# Patient Record
Sex: MALE | Race: WHITE | Employment: OTHER | URBAN - METROPOLITAN AREA
[De-identification: names, ages, dates, MRNs, and addresses within clinical notes are randomized per-mention and may not be internally consistent; named-entity substitution may affect disease eponyms.]

---

## 2017-01-12 DIAGNOSIS — E11.21 DIABETIC NEPHROPATHY (H): Primary | ICD-10-CM

## 2017-01-12 RX ORDER — GLIPIZIDE 10 MG/1
10 TABLET ORAL 2 TIMES DAILY
Qty: 180 TABLET | Refills: 1 | Status: SHIPPED | OUTPATIENT
Start: 2017-01-12 | End: 2017-08-04

## 2017-01-12 RX ORDER — CELECOXIB 200 MG/1
200 CAPSULE ORAL DAILY
Qty: 180 CAPSULE | Refills: 1 | Status: SHIPPED | OUTPATIENT
Start: 2017-01-12 | End: 2017-05-03

## 2017-01-12 RX ORDER — GABAPENTIN 300 MG/1
600 CAPSULE ORAL 2 TIMES DAILY
Qty: 360 CAPSULE | Refills: 3 | Status: SHIPPED | OUTPATIENT
Start: 2017-01-12 | End: 2017-08-04

## 2017-02-17 ENCOUNTER — DOCUMENTATION ONLY (OUTPATIENT)
Dept: VASCULAR SURGERY | Facility: CLINIC | Age: 77
End: 2017-02-17

## 2017-03-09 DIAGNOSIS — E11.9 DIABETES MELLITUS (H): ICD-10-CM

## 2017-03-13 ENCOUNTER — TRANSFERRED RECORDS (OUTPATIENT)
Dept: HEALTH INFORMATION MANAGEMENT | Facility: CLINIC | Age: 77
End: 2017-03-13

## 2017-03-14 ENCOUNTER — TRANSFERRED RECORDS (OUTPATIENT)
Dept: HEALTH INFORMATION MANAGEMENT | Facility: CLINIC | Age: 77
End: 2017-03-14

## 2017-03-27 ENCOUNTER — OFFICE VISIT (OUTPATIENT)
Dept: INTERNAL MEDICINE | Facility: CLINIC | Age: 77
End: 2017-03-27

## 2017-03-27 VITALS
OXYGEN SATURATION: 96 % | BODY MASS INDEX: 32.99 KG/M2 | RESPIRATION RATE: 20 BRPM | DIASTOLIC BLOOD PRESSURE: 78 MMHG | HEART RATE: 85 BPM | SYSTOLIC BLOOD PRESSURE: 122 MMHG | WEIGHT: 217 LBS

## 2017-03-27 DIAGNOSIS — Z13.5 SCREENING FOR DIABETIC RETINOPATHY: ICD-10-CM

## 2017-03-27 DIAGNOSIS — Z13.89 SCREENING FOR DIABETIC PERIPHERAL NEUROPATHY: ICD-10-CM

## 2017-03-27 DIAGNOSIS — R29.898 WEAKNESS OF BOTH LOWER EXTREMITIES: Primary | ICD-10-CM

## 2017-03-27 DIAGNOSIS — I73.9 CLAUDICATION (H): ICD-10-CM

## 2017-03-27 LAB — INTERPRETATION ECG - MUSE: NORMAL

## 2017-03-27 ASSESSMENT — PAIN SCALES - GENERAL: PAINLEVEL: NO PAIN (0)

## 2017-03-27 NOTE — PROGRESS NOTES
"SUBJECTIVE:    Pt is a 76 year old male with pmh of     Patient Active Problem List   Diagnosis     Hyperlipidemia     Diabetes mellitus type 2, insulin dependent (H)     Hypertension     Diverticulosis     ED (erectile dysfunction)     Diabetic nephropathy (H)     COPD (chronic obstructive pulmonary disease) (H)     Asthma exacerbation     Gout     Decreased GFR       who is here for evaluation of had concerns including Flu.    Mr. Gonzalez presents today with multiple concerns.    Firstly he is experiencing chest tightness. This occurs in the morning, lasting two hours. He experiences tightness in his right chest and his right upper back. He reports that he has had pain like this before and attributes it to muscle spasms. He is having the pain this morning. It is not exacerbated by exercise or walking. It goes away without taking any medications.    Secondly, he is experiencing decreased stamina in his lower extremities. After walking for about five minutes he feels exhausted and experiences \"soreness\" in his thighs and hips. This soreness improves with rest. He does not feel short of breath during this exertion. He reports that he has had this issue for years, but in the last few months it has gotten worse. He contributes that he has also had issues with balance. He reports that he was offered PT in the past which he had declined, but at this point he is interested in PT.    Thirdly, he is experiencing some lingering phlegm production from a cold he had six weeks ago. He reports clear, thick mucous that causes him to clear his throat. He denies a cough, SOB, or chest pain.    Fourthly, after his wife passed in November he has been experiencing significant grief. He staying with his daughter in [place] for 8 weeks which significantly helped his mood. His friend, Collins, reports that Mr. Gonzalez goes through cycles of having a low mood and then back to normal. He was previously prescribed sertraline, but he reports " that he has not taken it yet because he feels that his grief process is still natural. He reports that it is difficult being alone and questions if he should have come back from [place].    Current Outpatient Prescriptions   Medication Sig Dispense Refill     blood glucose monitoring (ACCU-CHEK MERY) test strip Test 2 times daily 200 each 3     gabapentin (NEURONTIN) 300 MG capsule Take 2 capsules (600 mg) by mouth 2 times daily 360 capsule 3     glipiZIDE (GLUCOTROL) 10 MG tablet Take 1 tablet (10 mg) by mouth 2 times daily 180 tablet 1     celecoxib (CELEBREX) 200 MG capsule Take 1 capsule (200 mg) by mouth daily 180 capsule 1     sertraline (ZOLOFT) 50 MG tablet Take 1 tablet (50 mg) by mouth daily 60 tablet 1     camphor-menthol (DERMASARRA) 0.5-0.5 % LOTN Applied to skin 3 times day p.r.n. for itching 1 Bottle 3     insulin pen needle (B-D U/F) 31G X 8 MM Use twice daily or as directed. 200 each 3     insulin glargine (LANTUS SOLOSTAR) 100 UNIT/ML PEN Inject 30 Units Subcutaneous 2 times daily 30 mL 1     metFORMIN modified (GLUMETZA) 500 MG 24 hr tablet Take 2 tablets every morning, 1 tablet at noon, and 2 tablets at bedtime. 450 tablet 1     allopurinol (ZYLOPRIM) 300 MG tablet Take 1 tablet (300 mg) by mouth daily 100 tablet 1     omeprazole (PRILOSEC) 40 MG capsule Take 1 capsule (40 mg) by mouth 2 times daily 180 capsule 1     cyclobenzaprine (FLEXERIL) 5 MG tablet Take one or two daily as needed for severe neck neck pain. 42 tablet 0     chlorthalidone (HYGROTON) 25 MG tablet Take one half tablet every morning.    Discontinue the hydrochlorothiazide 12.5 mg 60 tablet 3     losartan (COZAAR) 100 MG tablet Take 1 tablet (100 mg) by mouth daily 90 tablet 3     amLODIPine (NORVASC) 10 MG tablet Take 1 tablet (10 mg) by mouth daily 90 tablet 3     Dentifrices (BIOTENE DRY MOUTH) PSTE Apply 1 spray to affected area       simvastatin (ZOCOR) 20 MG tablet Take 1 tablet (20 mg) by mouth At Bedtime 90 tablet 3      indomethacin (INDOCIN) 50 MG capsule Take 1 capsule (50 mg) by mouth 3 times daily as needed Use only during a gout attack 30 capsule 0     albuterol (PROAIR HFA, PROVENTIL HFA, VENTOLIN HFA) 108 (90 BASE) MCG/ACT inhaler Inhale 2 puffs into the lungs every 6 hours       Cholecalciferol (VITAMIN D-3) 1000 UNITS CAPS Take 1,000 Units by mouth daily       saw palmetto 160 MG CAPS Take 160 mg by mouth daily       aspirin 81 MG tablet Take by mouth daily       tiotropium (SPIRIVA HANDIHALER) 18 MCG inhalation capsule Inhale contents of one capsule daily. 30 capsule 1     fluticasone-salmeterol (ADVAIR) 500-50 MCG/DOSE diskus inhaler Inhale 1 puff into the lungs 2 times daily 60 Inhaler 0     fluticasone (FLOVENT HFA) 110 MCG/ACT inhaler Inhale 2 puffs into the lungs 2 times daily 1 Inhaler 12     fluticasone (FLONASE) 50 MCG/ACT nasal spray 2 sprays by Both Nostrils route daily.       HYDROcodone-acetaminophen (NORCO) 5-325 MG per tablet Take 1 tablet by mouth nightly as needed for moderate to severe pain (Patient not taking: Reported on 3/27/2017) 10 tablet 0       Social History   Substance Use Topics     Smoking status: Passive Smoke Exposure - Never Smoker     Last attempt to quit: 2/10/1982     Smokeless tobacco: Never Used     Alcohol use 0.5 oz/week     1 drink(s) per week       Review Of Systems  Skin: negative  Eyes: negative  Ears/Nose/Throat: nasal congestion  Respiratory: No shortness of breath, dyspnea on exertion, cough, or hemoptysis  Cardiovascular: chest tightness  Gastrointestinal: negative  Genitourinary: negative  Musculoskeletal: muscular weakness  Neurologic: negative  Psychiatric: negative  Hematologic/Lymphatic/Immunologic: negative  Endocrine: negative    OBJECTIVE:  /78 (BP Location: Right arm, Patient Position: Chair, Cuff Size: Adult Large)  Pulse 85  Resp 20  Wt 98.4 kg (217 lb)  SpO2 96%  BMI 32.99 kg/m2  GENERAL APPEARANCE: Alert, no acute distress  EYES: PERRL, EOM normal,  conjunctiva and lids normal  HENT: Ears and TMs normal, oral mucosa and posterior oropharynx normal  NECK: No adenopathy,masses or thyromegaly  RESP: lungs clear to auscultation   CV: normal rate, regular rhythm, no murmur or gallop. No JVD. No palpable DIP bilaterally.  ABDOMEN: soft, no organomegaly, masses or tenderness  LYMPHATICS: No cervical, supraclavicular or inguinal adenopathy  MS: extremities normal, no peripheral edema. No chest pain to palpation,  NEURO: Alert, oriented, speech and mentation normal. 5/5 strength in upper and lower extremities bilaterally. Romberg test negative.  PSYCHE: mentation appears normal, affect and mood normal    ASSESSMENT/PLAN:    Mr. Gonzalez meño  75 yo male with a history of diabetes and hypertension.     Firstly, Atypical chest pain. EKG was unchanged from previous EKG with no ST abnormalities. His chest pain seems consistent with musculoskeletal pain. I recommended that he takes his previously prescribed Flexeril for neck and chest pain as he is describing now. If his pain changes in quality or severity he needs to be re-evaluated.    Secondly, Claudication. His leg stamina and soreness seems consistent with claudication. I ordered FRANCESCO duplex ultrasound to evaluated him for vascular disease as an etiology for his complaints. I ordered Balance PT to be done here for his decreased stamina and balance issues.    Thirdly, Congestion. He continues to feel congested after experiencing a cold 8 weeks ago. His complaints are consistent with a post-nasal drip. These symptoms are not exacerbating his COPD. I will hold off on prescribing a new medication. I will see him again on 4/10/17 at which time I will follow-up on his symptoms.    Fourthly, Severe grief reaction. His wife passed away in November and he is still struggling with the grief process. I previously prescribed him Sertralaine to help with this process. I recommended that he consistently takes this to level out his labile  symptoms.    This note was written by Willie Patel MS4 acting as scribe for Dr. Jeb MD.

## 2017-03-27 NOTE — PATIENT INSTRUCTIONS
Primary Care Center Medication Refill Request Information:  * Please contact your pharmacy regarding ANY request for medication refills.  ** [facility] Prescription Fax = [phone]  * Please allow 3 business days for routine medication refills.  * Please allow 5 business days for controlled substance medication refills.     Primary Care Center Test Result notification information:  *You will be notified with in 7-10 days of your appointment day regarding the results of your test.  If you are on MyChart you will be notified as soon as the provider has reviewed the results and signed off on them.

## 2017-03-27 NOTE — MR AVS SNAPSHOT
After Visit Summary   3/27/2017    Rolando Gonzalez    MRN: [id number]           Patient Information     Date Of Birth          1940        Visit Information        Provider Department      3/27/2017 8:10 AM Denise Russ MD [facility]        Today's Diagnoses     Weakness of both lower extremities    -  1    Screening for diabetic retinopathy        Screening for diabetic peripheral neuropathy        Claudication (H)          Care Instructions    Primary Care Center Medication Refill Request Information:  * Please contact your pharmacy regarding ANY request for medication refills.  ** [facility] Prescription Fax = [phone]  * Please allow 3 business days for routine medication refills.  * Please allow 5 business days for controlled substance medication refills.     Primary Care Center Test Result notification information:  *You will be notified with in 7-10 days of your appointment day regarding the results of your test.  If you are on MyChart you will be notified as soon as the provider has reviewed the results and signed off on them.        Follow-ups after your visit        Additional Services     OPHTHALMOLOGY ADULT REFERRAL       Your provider has referred you to: [facility]: Eye Clinic [facility] [phone]   [email or web address]    Please be aware that coverage of these services is subject to the terms and limitations of your health insurance plan.  Call member services at your health plan with any benefit or coverage questions.      Please bring the following with you to your appointment:    (1) Any X-Rays, CTs or MRIs which have been performed.  Contact the facility where they were done to arrange for  prior to your scheduled appointment.    (2) List of current medications  (3) This referral request   (4) Any documents/labs given to you for this referral            PHYSICAL THERAPY REFERRAL       *This therapy referral will be  filtered to a centralized scheduling office at [facility] and the patient will receive a call to schedule an appointment at a [place] location most convenient for them. *     [facility] provides Physical Therapy evaluation and treatment and many specialty services across the [place] system.  If requesting a specialty program, please choose from the list below.    If you have not heard from the scheduling office within 2 business days, please call [phone] for all locations, with the exception of [place], please call [phone].  Treatment: Evaluation & Treatment  Special Instructions/Modalities: Balance  Special Programs: N Balance PT    Please be aware that coverage of these services is subject to the terms and limitations of your health insurance plan.  Call member services at your health plan with any benefit or coverage questions.      **Note to Provider:  If you are referring outside of [place] for the therapy appointment, please list the name of the location in the  special instructions  above, print the referral and give to the patient to schedule the appointment.                  Follow-up notes from your care team     Return in about 4 weeks (around 4/24/2017).      Your next 10 appointments already scheduled     Mar 31, 2017  1:00 PM CDT   US FRANCESCO DOPPLER NO EXERCISE with [id number] ([facility])    [address] [place] [address] [phone]           Please bring a list of your medicines (including vitamins, minerals and over-the-counter drugs). Also, tell your doctor about any allergies you may have. Wear comfortable clothes and leave your valuables at home.  No caffeine or tobacco for 1 hour prior to exam.  Please call the Imaging Department at your exam site with any questions.            Apr 13, 2017  8:00 AM CDT   LAB with  LAB   [facility] ([facility])    [address] [place] [address]  [address] [facility] [id number] [phone]           Patient must bring picture ID.  Patient should be prepared to give a urine specimen  Please do not eat 10-12 hours before your appointment if you are coming in fasting for labs on lipids, cholesterol, or glucose (sugar).  Pregnant women should follow their Care Team instructions. Water with medications is okay. Do not drink coffee or other fluids.   If you have concerns about taking  your medications, please ask at office or if scheduling via [employer], send a message by clicking on Secure Messaging, Message Your Care Team.            Apr 13, 2017 10:00 AM CDT   NEW CORNEA with Haider Doyle MD   Eye Clinic ([facility])    [address] [facility]  [address] [address]  [facility] [address]   [phone]            Apr 24, 2017  8:40 AM CDT   (Arrive by 8:25 AM)   Return Visit with Denise Russ MD   [facility] ([facility])    [address] [place] [address]  [address] [facility] [id number] [phone]              Future tests that were ordered for you today     Open Future Orders        Priority Expected Expires Ordered    Lipid panel reflex to direct LDL - -(Today) Routine 3/27/2017 3/27/2018 3/27/2017    US FRANCESCO Doppler No Exercise Routine  3/27/2018 3/27/2017    HEMOGLOBIN A1C -(Today) Routine 3/27/2017 3/27/2018 3/27/2017            Who to contact     Please call your clinic at [phone] to:    Ask questions about your health    Make or cancel appointments    Discuss your medicines    Learn about your test results    Speak to your doctor   If you have compliments or concerns about an experience at your clinic, or if you wish to file a complaint, please contact [facility] Patient Relations at [phone] or email us at [email or web address]         Additional Information About Your Visit         [employer] Information     [employer] gives you secure access to your electronic health record. If you see a primary care provider, you can also send messages to your care team and make appointments. If you have questions, please call your primary care clinic.  If you do not have a primary care provider, please call [phone] and they will assist you.      [employer] fusion is an electronic gateway that provides easy, online access to your medical records. With [employer] fusion, you can request a clinic appointment, read your test results, renew a prescription or communicate with your care team.     To access your existing account, please contact your [facility] or call [phone] for assistance.        Care EveryWhere ID     This is your Care EveryWhere ID. This could be used by other organizations to access your [facility] medical records  [id number]        Your Vitals Were     Pulse Respirations Pulse Oximetry BMI (Body Mass Index)          85 20 96% 32.99 kg/m2         Blood Pressure from Last 3 Encounters:   03/27/17 122/78   12/20/16 148/77   09/19/16 144/86    Weight from Last 3 Encounters:   03/27/17 98.4 kg (217 lb)   12/20/16 101.2 kg (223 lb)   09/19/16 103.4 kg (228 lb)              We Performed the Following     EKG Performed in Clinic w/ Provider Reading Fee     FOOT EXAM - NO CHARGE     OPHTHALMOLOGY ADULT REFERRAL     PHYSICAL THERAPY REFERRAL        Primary Care Provider Office Phone # Fax #    Denise Russ [phone]        PHYSICIANS [address] [facility] [address]  [facility] [address]        Thank you!     Thank you for choosing [facility]  for your care. Our goal is always to provide you with excellent care. Hearing back from our patients is one way we can continue to improve our services. Please take a few minutes to complete the written survey that you may receive in the mail after your visit with us. Thank you!             Your Updated  Medication List - Protect others around you: Learn how to safely use, store and throw away your medicines at www.disposemymeds.org.          This list is accurate as of: 3/27/17 10:34 AM.  Always use your most recent med list.                   Brand Name Dispense Instructions for use    albuterol 108 (90 BASE) MCG/ACT Inhaler    PROAIR HFA/PROVENTIL HFA/VENTOLIN HFA     Inhale 2 puffs into the lungs every 6 hours       allopurinol 300 MG tablet    ZYLOPRIM    100 tablet    Take 1 tablet (300 mg) by mouth daily       amLODIPine 10 MG tablet    NORVASC    90 tablet    Take 1 tablet (10 mg) by mouth daily       aspirin 81 MG tablet      Take by mouth daily       BIOTENE DRY MOUTH Pste      Apply 1 spray to affected area       blood glucose monitoring test strip    ACCU-CHEK MERY    200 each    Test 2 times daily       camphor-menthol 0.5-0.5 % Lotn    DERMASARRA    1 Bottle    Applied to skin 3 times day p.r.n. for itching       celecoxib 200 MG capsule    celeBREX    180 capsule    Take 1 capsule (200 mg) by mouth daily       chlorthalidone 25 MG tablet    HYGROTON    60 tablet    Take one half tablet every morning.  Discontinue the hydrochlorothiazide 12.5 mg       cyclobenzaprine 5 MG tablet    FLEXERIL    42 tablet    Take one or two daily as needed for severe neck neck pain.       fluticasone 110 MCG/ACT Inhaler    FLOVENT HFA    1 Inhaler    Inhale 2 puffs into the lungs 2 times daily       fluticasone 50 MCG/ACT spray    FLONASE     2 sprays by Both Nostrils route daily.       fluticasone-salmeterol 500-50 MCG/DOSE diskus inhaler    ADVAIR    60 Inhaler    Inhale 1 puff into the lungs 2 times daily       gabapentin 300 MG capsule    NEURONTIN    360 capsule    Take 2 capsules (600 mg) by mouth 2 times daily       glipiZIDE 10 MG tablet    GLUCOTROL    180 tablet    Take 1 tablet (10 mg) by mouth 2 times daily       HYDROcodone-acetaminophen 5-325 MG per tablet    NORCO    10 tablet    Take 1 tablet by mouth  nightly as needed for moderate to severe pain       indomethacin 50 MG capsule    INDOCIN    30 capsule    Take 1 capsule (50 mg) by mouth 3 times daily as needed Use only during a gout attack       insulin glargine 100 UNIT/ML injection    LANTUS SOLOSTAR    30 mL    Inject 30 Units Subcutaneous 2 times daily       insulin pen needle 31G X 8 MM    B-D U/F    200 each    Use twice daily or as directed.       losartan 100 MG tablet    COZAAR    90 tablet    Take 1 tablet (100 mg) by mouth daily       metFORMIN modified 500 MG 24 hr tablet    GLUMETZA    450 tablet    Take 2 tablets every morning, 1 tablet at noon, and 2 tablets at bedtime.       omeprazole 40 MG capsule    priLOSEC    180 capsule    Take 1 capsule (40 mg) by mouth 2 times daily       saw palmetto 160 MG Caps      Take 160 mg by mouth daily       sertraline 50 MG tablet    ZOLOFT    60 tablet    Take 1 tablet (50 mg) by mouth daily       simvastatin 20 MG tablet    ZOCOR    90 tablet    Take 1 tablet (20 mg) by mouth At Bedtime       tiotropium 18 MCG capsule    SPIRIVA HANDIHALER    30 capsule    Inhale contents of one capsule daily.       Vitamin D-3 1000 UNITS Caps      Take 1,000 Units by mouth daily

## 2017-03-27 NOTE — PROGRESS NOTES
The medical student acted as scribe and the encounter documented above was completely performed by myself.  Supervising Doctor Denise Russ MD

## 2017-03-27 NOTE — NURSING NOTE
"Chief Complaint   Patient presents with     Flu     Started out 3 months ago with \"flu\", \" goes and comes\",legs and hips pain with walking/weakness and chest tightness   Kristy Wise LPN 8:37 AM on 3/27/2017  "

## 2017-04-04 ENCOUNTER — HOSPITAL ENCOUNTER (OUTPATIENT)
Dept: PHYSICAL THERAPY | Facility: CLINIC | Age: 77
Setting detail: THERAPIES SERIES
End: 2017-04-04
Attending: INTERNAL MEDICINE
Payer: MEDICARE

## 2017-04-04 PROCEDURE — 97110 THERAPEUTIC EXERCISES: CPT | Mod: GP | Performed by: PHYSICAL THERAPIST

## 2017-04-04 PROCEDURE — G8978 MOBILITY CURRENT STATUS: HCPCS | Mod: GP,CK | Performed by: PHYSICAL THERAPIST

## 2017-04-04 PROCEDURE — 40000719 ZZHC STATISTIC PT DEPARTMENT NEURO VISIT: Performed by: PHYSICAL THERAPIST

## 2017-04-04 PROCEDURE — G8979 MOBILITY GOAL STATUS: HCPCS | Mod: GP,CJ | Performed by: PHYSICAL THERAPIST

## 2017-04-04 PROCEDURE — 97162 PT EVAL MOD COMPLEX 30 MIN: CPT | Mod: GP | Performed by: PHYSICAL THERAPIST

## 2017-04-04 ASSESSMENT — 6 MINUTE WALK TEST (6MWT): TOTAL DISTANCE WALKED (FT): 1075

## 2017-04-04 NOTE — PROGRESS NOTES
04/04/17 1100   Signing Clinician's Name / Credentials   Signing clinician's name / credentials [facility]    Functional Gait Assessment (HEATHER Lobo., Kay GMaura F., et al. (2004))   1. GAIT LEVEL SURFACE 2   2. CHANGE IN GAIT SPEED 2   3. GAIT WITH HORIZONTAL HEAD TURNS 2   4. GAIT WITH VERTICAL HEAD TURNS 2   5. GAIT AND PIVOT TURN 3   6. STEP OVER OBSTACLE 3   7. GAIT WITH NARROW BASE OF SUPPORT 0   8. GAIT WITH EYES CLOSED 1   9. AMBULATING BACKWARDS 1   10. STEPS 2   Total Functional Gait Assessment Score   TOTAL SCORE: (MAXIMUM SCORE 30) 18

## 2017-04-04 NOTE — PROGRESS NOTES
04/04/17 1100   Quick Adds   Type of Visit Initial OP PT Evaluation   General Information   Start of Care Date 04/04/17   Referring Physician Denise Russ MD    Orders Evaluate and Treat as Indicated   Order Date 03/27/17   Medical Diagnosis Weakness of both lower extremities    Onset of illness/injury or Date of Surgery 03/27/17   Surgical/Medical history reviewed Yes   Pertinent history of current problem (include personal factors and/or comorbidities that impact the POC) Patient reports legs feel tired all the time and has gotten worse over the past 6 months to a year. Patient states it has also gotten worse since his wife's death in November 2016. Patient reports not using a AD. Pt states he gets tired walking easily and is not able to keep up with people anymore. Patient states about 3-4 weeks ago had to walk a long ways and was afraid he might fall. Patient states he senses balance issues. Patient states he gets short of breath with walking. Client is still grieving a lot for the loss of his wife. Patient states he tried medication prescribed for depression, however he discontinued it since he did not like the side effects. PMH significant for diabetic neuropathy, PVD, COPD, and asthma.    Prior level of function comment Patient independent with all ADLs, no outside assistance needed. Still driving.    [facility]   Home/Community Accessibility Comments 1 story. Lives alone.    Assistive Devices Comments No AD used at this time   Patient/Family Goals Statement improve balance, be able to walk for longer    Fall Risk Screen   Fall screen completed by PT   Per patient - Fall 2 or more times in past year? No   Per patient - Fall with injury in past year? No   Timed Up and Go score (seconds) 11.57   Is patient a fall risk? Yes   System Outcome Measures   -PAC  Basic Mobility Score Level  (Lower scores equate to lower levels of function) 68.81   AM-PAC  Daily Activity Score Level   (Lower scores equate to lower levels of function) 80.91   AM-PAC  Applied Cognitive Score Level  (Lower scores equate to lower levels of function) 39.28   Pain   Pain comments Pain in LEs but is improved with rest   Cognitive Status Examination   Level of Consciousness alert   Strength   Strength Comments B hip flexion 3/5,  B knee extension 4/5, Dorsiflexion R 4/5 and L 3+/5, B hip abduction 3/5,  B knee flexion 4/5,  B hip extension 3+/5   Bed Mobility   Bed Mobility Comments Independent with bed mobility   Transfer Skills   Transfer Comments Independent with transfers    Gait   Gait Comments increased foot slap on left foot demo poor eccentric control of dorsiflexors on L side, short step length, decreased arm swing on left side, decreased trunk rotation, decreased terminal hip extension, path deviation that is increased with LE fatigue   Gait Special Tests Functional Gait Assessment Score out of 30   Score out of 30 18   Comments indicates at increased risk for falls.    Gait Special Tests Six Minute Walk Test   Feet 1075 Feet   Comments increased path deviation towards end of test due to LE fatigue    Balance Special Tests Modified CTSIB Conditions   Condition 1, seconds 30 Seconds   Condition 2, seconds 15 Seconds   Condition 4, seconds 30 Seconds   Condition 5, seconds 1 Seconds   Balance Special Tests Sit to Stand Reps in 30 Seconds   Reps in 30 seconds 7   Sensory Examination   Sensory Perception Comments Patient reports he has mild numbness in both feet   Planned Therapy Interventions   Planned Therapy Interventions balance training;gait training;neuromuscular re-education;ROM;strengthening;stretching   Clinical Impression   Criteria for Skilled Therapeutic Interventions Met yes, treatment indicated   PT Diagnosis Bilateral LE weakness, impaired balance    Influenced by the following impairments decreased B LE strength, impaired sensation, decreased activity tolerance, decreased standing balance,     Functional limitations due to impairments decreased community ambulation, at risk for falls    Clinical Presentation Evolving/Changing   Clinical Presentation Rationale TUG, 6MWT, FGA, mCTSIB, sit to stand test, pain in legs variable, depression, co-morbidities.    Clinical Decision Making (Complexity) Moderate complexity   Therapy Frequency 1 time/week   Predicted Duration of Therapy Intervention (days/wks) 1x/week for 60 days    Risk & Benefits of therapy have been explained Yes   Patient, Family & other staff in agreement with plan of care Yes   Clinical Impression Comments Patient will benefit from skilled therapy in order to improve LE strength, improve standing balance, and increase activity tolerance in order to address functional limitations and reduce risk of falls.    Goal 1   Goal Identifier FGA   Goal Description Patient will improve FGA score to 22/30 in order to demonstrate increased safety with functional mobility   Target Date 06/02/17   Goal 2   Goal Identifier 6MWT   Goal Description Patient will increase 6MWT by 150 feet in order to demonstrate increased walking tolerance.   Target Date 06/02/17   Goal 3   Goal Identifier mCTSIB   Goal Description Patient will achieve 30 seconds in condition 2 of mCTSIB in order to demonstrate improved standing balance toelrance and promote safety with tasks like walking to the bathroom at night.    Target Date 06/02/17   Goal 4   Goal Identifier HEP    Goal Description Patient will be independent in walking program at end of therapy session in order to maintain activity tolerance.    Target Date 06/02/17   Total Evaluation Time   Total Evaluation Time (Minutes) 50

## 2017-04-05 ENCOUNTER — TELEPHONE (OUTPATIENT)
Dept: INTERNAL MEDICINE | Facility: CLINIC | Age: 77
End: 2017-04-05

## 2017-04-05 DIAGNOSIS — J32.9 SINUSITIS, CHRONIC: Primary | ICD-10-CM

## 2017-04-05 NOTE — TELEPHONE ENCOUNTER
Pt was recommended by his asthma doctor to see ENT for his sinus symptoms (see the note on 3/27), wondering if he could have a referral. ENT referral is placed. He will call for an appt.

## 2017-04-06 ENCOUNTER — PRE VISIT (OUTPATIENT)
Dept: OTOLARYNGOLOGY | Facility: CLINIC | Age: 77
End: 2017-04-06

## 2017-04-06 NOTE — TELEPHONE ENCOUNTER
1.  Date/reason for appt:  5/31/17   Sinusitis    2.  Referring provider:  Dr Russ    3.  Call to patient (Yes / No - short description):  No, transferred from .      Followed at [facility], Dr Álvarez    Previous sinus surgeries were done at [facility]    Mailed ECTOR forms to home address on file.

## 2017-04-06 NOTE — PROGRESS NOTES
[facility]        OUTPATIENT PHYSICAL THERAPY FUNCTIONAL EVALUATION  PLAN OF TREATMENT FOR OUTPATIENT REHABILITATION  (COMPLETE FOR INITIAL CLAIMS ONLY)  Patient's Last Name, First Name, M.I.  YOB: 1940  Rolando Gonzalez     Provider's Name   [facility]   Medical Record No.  [id number]     Start of Care Date:  04/04/17   Onset Date:  03/27/17   Type:     _X__PT   ____OT  ____SLP Medical Diagnosis:   Weakness of both lower extremities     PT Diagnosis:  Bilateral LE weakness, impaired balance  Visits from SOC:  1                              __________________________________________________________________________________  Plan of Treatment/Functional Goals:  balance training, gait training, neuromuscular re-education, ROM, strengthening, stretching           GOALS  FGA  Patient will improve FGA score to 22/30 in order to demonstrate increased safety with functional mobility  06/02/17    6MWT  Patient will increase 6MWT by 150 feet in order to demonstrate increased walking tolerance.  06/02/17    mCTSIB  Patient will achieve 30 seconds in condition 2 of mCTSIB in order to demonstrate improved standing balance toelrance and promote safety with tasks like walking to the bathroom at night.   06/02/17    HEP   Patient will be independent in walking program at end of therapy session in order to maintain activity tolerance.   06/02/17        Therapy Frequency:  1 time/week   Predicted Duration of Therapy Intervention:  1x/week for 60 days     Louise Vasquez, PT                                    I CERTIFY THE NEED FOR THESE SERVICES FURNISHED UNDER        THIS PLAN OF TREATMENT AND WHILE UNDER MY CARE     (Physician co-signature of this document indicates review and certification of the therapy plan).                Certification Date From:    4/4/17  Certification  Date To:   6/2/17    Referring Provider:  Denise uRss MD     Initial Assessment  See Epic Evaluation- Start of Care Date: 04/04/17

## 2017-04-08 NOTE — TELEPHONE ENCOUNTER
insulin glargine (LANTUS SOLOSTAR) 100 UNIT/ML PEN   Last Written Prescription Date:  11/17/16  Last Fill Quantity: 30ml,   # refills: 1rf  Last Office Visit with G, P or [facility] prescribing provider: 3/27/17  Future Office visit:   4/24/17    Lab Results   Component Value Date    A1C 6.9 08/22/2016    A1C 9.6 05/31/2016    A1C 7.3 03/22/2016    A1C 6.7 08/25/2015    A1C 6.7 03/10/2015     Lab Results   Component Value Date    MICROL 223 08/22/2016     No results found for: MICROALBUMIN

## 2017-04-12 ENCOUNTER — TELEPHONE (OUTPATIENT)
Dept: INTERNAL MEDICINE | Facility: CLINIC | Age: 77
End: 2017-04-12

## 2017-04-12 DIAGNOSIS — I73.9 CLAUDICATION (H): Primary | ICD-10-CM

## 2017-04-12 DIAGNOSIS — I73.9 PAD (PERIPHERAL ARTERY DISEASE) (H): ICD-10-CM

## 2017-04-12 NOTE — TELEPHONE ENCOUNTER
"----- Message from Olivia Haines sent at 4/12/2017  1:52 PM CDT -----  Regarding: Questions about US  Contact: [phone]  Please call him regarding the US and symptoms he is having    Pt states he forget to tell you that he has been \"tripping\" a lot lately. Pt states left leg is numb. Pt was asleep when you called today and doesn't really remember your conversation.  "

## 2017-04-13 ENCOUNTER — OFFICE VISIT (OUTPATIENT)
Dept: OPHTHALMOLOGY | Facility: CLINIC | Age: 77
End: 2017-04-13
Attending: OPHTHALMOLOGY
Payer: MEDICARE

## 2017-04-13 ENCOUNTER — MYC MEDICAL ADVICE (OUTPATIENT)
Dept: INTERNAL MEDICINE | Facility: CLINIC | Age: 77
End: 2017-04-13

## 2017-04-13 DIAGNOSIS — D31.32 CHOROIDAL NEVUS OF LEFT EYE: Primary | ICD-10-CM

## 2017-04-13 DIAGNOSIS — Z13.89 SCREENING FOR DIABETIC PERIPHERAL NEUROPATHY: ICD-10-CM

## 2017-04-13 DIAGNOSIS — Z13.5 SCREENING FOR DIABETIC RETINOPATHY: ICD-10-CM

## 2017-04-13 LAB
CHOLEST SERPL-MCNC: 129 MG/DL
HBA1C MFR BLD: 7.1 % (ref 4.3–6)
HDLC SERPL-MCNC: 38 MG/DL
LDLC SERPL CALC-MCNC: 65 MG/DL
NONHDLC SERPL-MCNC: 91 MG/DL
TRIGL SERPL-MCNC: 130 MG/DL

## 2017-04-13 PROCEDURE — 92015 DETERMINE REFRACTIVE STATE: CPT | Mod: ZF

## 2017-04-13 PROCEDURE — 99212 OFFICE O/P EST SF 10 MIN: CPT | Mod: ZF

## 2017-04-13 ASSESSMENT — VISUAL ACUITY
OS_CC: 20/20
OS_BAT_MED: 20/40
METHOD: SNELLEN - LINEAR
OD_CC: 20/20
OD_CC+: -1
OS_BAT_HIGH: 20/40
OS_CC+: -1
OD_BAT_HIGH: 20/30
OD_BAT_MED: 20/25
CORRECTION_TYPE: GLASSES

## 2017-04-13 ASSESSMENT — REFRACTION_MANIFEST
OD_SPHERE: -0.75
OS_AXIS: 008
OS_SPHERE: -1.00
OD_AXIS: 168
OD_ADD: +2.75
OD_CYLINDER: +1.00
OS_CYLINDER: +0.50
OS_ADD: +2.75

## 2017-04-13 ASSESSMENT — REFRACTION_WEARINGRX
OD_AXIS: 163
OS_CYLINDER: +0.50
OD_CYLINDER: +1.75
OS_ADD: +2.75
OS_AXIS: 004
OD_SPHERE: -0.75
OD_ADD: +2.75
OS_SPHERE: -0.50

## 2017-04-13 ASSESSMENT — CONF VISUAL FIELD
OD_NORMAL: 1
OS_NORMAL: 1

## 2017-04-13 ASSESSMENT — CUP TO DISC RATIO
OD_RATIO: 0.1
OS_RATIO: 0.1

## 2017-04-13 ASSESSMENT — SLIT LAMP EXAM - LIDS
COMMENTS: NORMAL
COMMENTS: NORMAL

## 2017-04-13 ASSESSMENT — EXTERNAL EXAM - RIGHT EYE: OD_EXAM: NORMAL

## 2017-04-13 ASSESSMENT — EXTERNAL EXAM - LEFT EYE: OS_EXAM: NORMAL

## 2017-04-13 ASSESSMENT — TONOMETRY
OD_IOP_MMHG: 15
IOP_METHOD: TONOPEN
OS_IOP_MMHG: 14

## 2017-04-13 NOTE — MR AVS SNAPSHOT
After Visit Summary   4/13/2017    Rolando Gonzalez    MRN: [id number]           Patient Information     Date Of Birth          1940        Visit Information        Provider Department      4/13/2017 10:00 AM Ha[facility]        Today's Diagnoses     Choroidal nevus of left eye    -  1    Screening for diabetic retinopathy           Follow-ups after your visit        Follow-up notes from your care team     Return in about 1 year (around 4/13/2018).      Your next 10 appointments already scheduled     Apr 20, 2017 10:00 AM CDT   Treatment 45 with Louise Sutherland, PT   [facility] ([facility])    [address]  [address]  [facility] [address]   [phone]            Apr 24, 2017  7:00 AM CDT   US LOWER EXTREMITY ARTERIAL DUPLEX BILATERAL with [id number]   [facility] ([facility])    [address] [place] [address]  1st Floor  [facility] [id number] [phone]           Please bring a list of your medicines (including vitamins, minerals and over-the-counter drugs). Also, tell your doctor about any allergies you may have. Wear comfortable clothes and leave your valuables at home.  You do not need to do anything special to prepare for your exam.  Please call the Imaging Department at your exam site with any questions.            Apr 24, 2017  8:40 AM CDT   (Arrive by 8:25 AM)   Return Visit with Denise Russ MD   [facility] ([facility])    [address]  4th Floor  [facility] [id number] [phone]            Apr 27, 2017 10:00 AM CDT   Treatment 45 with Louise Sutherland, PT   [facility] ([facility])    [address]  [address]  [facility] [address]   [phone]            May 04, 2017 10:45 AM CDT   Treatment 45 with Louise Sutherland PT   [facility] ([facility])    [address] [facility]  [place]  [address]  [place] [address]   [phone]            May 11, 2017 10:00 AM CDT   Treatment 45 with Louise Sutherland, PT   [facility] ([facility])    [address]  [address]  [place] [address]   [phone]            May 18, 2017 11:00 AM CDT   Treatment 45 with Louise Sutherland, PT   [facility] ([facility])    [address] [place] [address]  [address]  [place] [address]   [phone]            May 25, 2017 10:00 AM CDT   Treatment 45 with Louise Sutherland, PT   [facility] ([facility])    [address] [place] [address]  [address]  [place] [address]   [phone]            May 31, 2017  8:15 AM CDT   (Arrive by 8:00 AM)   NEW RHINOLOGY with Cady Redd MD   [facility] ([facility])    [address] [place] [address]   [phone]            Jun 01, 2017 10:30 AM CDT   Treatment 45 with Louise Sutherland, PT   [facility] ([facility])    [address] [place] [address] [address]  [place] [address]   [phone]              Future tests that were ordered for you today     Open Future Orders        Priority Expected Expires Ordered    US Lower Extremity Arterial Duplex Bilateral Routine  4/12/2018 4/12/2017            Who to contact     Please call your clinic at [phone] to:    Ask questions about your health    Make or cancel appointments    Discuss your medicines    Learn about your test results    Speak to your doctor   If you have compliments or concerns about an experience at your clinic, or if you wish to file a complaint, please contact [facility] Patient Relations at [phone] or email us at [email or web address]         Additional Information About Your Visit        MyChart Information     [employer] gives you secure access to your electronic health record. If you see a primary  care provider, you can also send messages to your care team and make appointments. If you have questions, please call your primary care clinic.  If you do not have a primary care provider, please call [phone] and they will assist you.      [employer] is an electronic gateway that provides easy, online access to your medical records. With [employer], you can request a clinic appointment, read your test results, renew a prescription or communicate with your care team.     To access your existing account, please contact your [facility] or call [phone] for assistance.        Care EveryWhere ID     This is your Care EveryWhere ID. This could be used by other organizations to access your [place] medical records  [id number]         Blood Pressure from Last 3 Encounters:   03/27/17 122/78   12/20/16 148/77   09/19/16 144/86    Weight from Last 3 Encounters:   03/27/17 98.4 kg (217 lb)   12/20/16 101.2 kg (223 lb)   09/19/16 103.4 kg (228 lb)              Today, you had the following     No orders found for display       Primary Care Provider Office Phone # Fax #    Denise Russ [phone]        [facility] [address] [facility] [address]  [facility] [address]        Thank you!     Thank you for choosing [facility]  for your care. Our goal is always to provide you with excellent care. Hearing back from our patients is one way we can continue to improve our services. Please take a few minutes to complete the written survey that you may receive in the mail after your visit with us. Thank you!             Your Updated Medication List - Protect others around you: Learn how to safely use, store and throw away your medicines at www.disposemymeds.org.          This list is accurate as of: 4/13/17 11:44 AM.  Always use your most recent med list.                   Brand Name Dispense Instructions for use    albuterol 108 (90 BASE) MCG/ACT Inhaler    PROAIR HFA/PROVENTIL  HFA/VENTOLIN HFA     Inhale 2 puffs into the lungs every 6 hours       allopurinol 300 MG tablet    ZYLOPRIM    100 tablet    Take 1 tablet (300 mg) by mouth daily       amLODIPine 10 MG tablet    NORVASC    90 tablet    Take 1 tablet (10 mg) by mouth daily       aspirin 81 MG tablet      Take by mouth daily       BIOTENE DRY MOUTH Pste      Apply 1 spray to affected area       blood glucose monitoring test strip    ACCU-CHEK MERY    200 each    Test 2 times daily       camphor-menthol 0.5-0.5 % Lotn    DERMASARRA    1 Bottle    Applied to skin 3 times day p.r.n. for itching       celecoxib 200 MG capsule    celeBREX    180 capsule    Take 1 capsule (200 mg) by mouth daily       chlorthalidone 25 MG tablet    HYGROTON    60 tablet    Take one half tablet every morning.  Discontinue the hydrochlorothiazide 12.5 mg       cyclobenzaprine 5 MG tablet    FLEXERIL    42 tablet    Take one or two daily as needed for severe neck neck pain.       fluticasone 110 MCG/ACT Inhaler    FLOVENT HFA    1 Inhaler    Inhale 2 puffs into the lungs 2 times daily       fluticasone 50 MCG/ACT spray    FLONASE     2 sprays by Both Nostrils route daily.       fluticasone-salmeterol 500-50 MCG/DOSE diskus inhaler    ADVAIR    60 Inhaler    Inhale 1 puff into the lungs 2 times daily       gabapentin 300 MG capsule    NEURONTIN    360 capsule    Take 2 capsules (600 mg) by mouth 2 times daily       glipiZIDE 10 MG tablet    GLUCOTROL    180 tablet    Take 1 tablet (10 mg) by mouth 2 times daily       HYDROcodone-acetaminophen 5-325 MG per tablet    NORCO    10 tablet    Take 1 tablet by mouth nightly as needed for moderate to severe pain       indomethacin 50 MG capsule    INDOCIN    30 capsule    Take 1 capsule (50 mg) by mouth 3 times daily as needed Use only during a gout attack       insulin glargine 100 UNIT/ML injection    LANTUS SOLOSTAR    30 mL    Inject 30 Units Subcutaneous 2 times daily       insulin pen needle 31G X 8 MM    B-D  U/F    200 each    Use twice daily or as directed.       losartan 100 MG tablet    COZAAR    90 tablet    Take 1 tablet (100 mg) by mouth daily       metFORMIN modified 500 MG 24 hr tablet    GLUMETZA    450 tablet    Take 2 tablets every morning, 1 tablet at noon, and 2 tablets at bedtime.       omeprazole 40 MG capsule    priLOSEC    180 capsule    Take 1 capsule (40 mg) by mouth 2 times daily       saw palmetto 160 MG Caps      Take 160 mg by mouth daily       sertraline 50 MG tablet    ZOLOFT    60 tablet    Take 1 tablet (50 mg) by mouth daily       simvastatin 20 MG tablet    ZOCOR    90 tablet    Take 1 tablet (20 mg) by mouth At Bedtime       tiotropium 18 MCG capsule    SPIRIVA HANDIHALER    30 capsule    Inhale contents of one capsule daily.       Vitamin D-3 1000 UNITS Caps      Take 1,000 Units by mouth daily

## 2017-04-13 NOTE — NURSING NOTE
Chief Complaints and History of Present Illnesses   Patient presents with     Follow Up For     Pseudophakia both eyes       HPI    Affected eye(s):  Both   Symptoms:        Duration:  1 year   Frequency:  Constant       Do you have eye pain now?:  No      Comments:  Pt. States that he has noticed that vision is worsening BE.  Glasses always seem dirty.  Mild dryness BE.  No longer seeing any floaters.  Kimmy Garcia COT 9:55 AM April 13, 2017   Lab Results       Component                Value               Date                       A1C                      7.1                 04/13/2017                 A1C                      6.9                 08/22/2016                 A1C                      9.6                 05/31/2016                 A1C                      7.3                 03/22/2016                 A1C                      6.7                 08/25/2015

## 2017-04-13 NOTE — PROGRESS NOTES
Rolando Gonzalez is a 74 year old male here for follow up of pseudophakia, diabetes. Has not been seen since 2014.       Last A1c today 7.1%. Blood sugars have been somewhat elevated recently, typically 150s-180s. Recently on oral steroids for respiratory inflammation. States vision more blurry the past 6 months. Floaters have resolved, but feels that vision is somewhat more cloudy. Denies pain, dry eye sensation or discomfort.       1. Choroidal nevus, left eye    -flat, no pigment changes, no high risk features   -stable compared to fundus photos dating 1/24/13   -recommend follow up in retina clinic within the next year   -return to cornea clinic one year     2. Posterior vitreous detachment (PVD) OU   -s/s Retinal detachment reviewed   -retina attached     3. Pseudophakia both eyes     -s/p Cataract extraction intraocular lens both eyes    -RE: 4/9/13, LE: 3/26/13   -20/20 OU   -mild change in MRx- updated MRx given    4. Diabetes mellitus no Diabetic retinopathy   -blood pressure/BS control    A1c is 7.1%   -annual DFE    RTC 1 year    Pito Saucedo MD  PGY3, Dept of Ophthalmology      ~~~~~~~~~~~~~~~~~~~~~~~~~~~~~~~~~~~~~~~~~~~~~~~~~~~~~~~~~~~~~~~~    Complete documentation of historical and exam elements from today's encounter can be found in the full encounter summary report (not reduplicated in this progress note). I personally obtained the chief complaint(s) and history of present illness.  I confirmed and edited as necessary the review of systems, past medical/surgical history, family history, social history, and examination findings as documented by others.  I examined the patient myself, and I personally reviewed the relevant tests, images, and reports as documented above. I formulated and edited as necessary the assessment and plan and discussed the findings and management plan with the patient and family.     Haider Doyle MD, MA  Director, Cornea & Anterior Segment  [facility]  Department of Ophthalmology & Visual Neuroscience

## 2017-04-13 NOTE — TELEPHONE ENCOUNTER
Records received from [facility].   Included  Office notes: 3/13/17, 12/13/16, 5/4/16  Radiology reports: CT chest on 3/14/17, 5/16/16, 1/23/15-[facility]    Missing/needed records: check on xray's with [facility]

## 2017-04-14 ENCOUNTER — TELEPHONE (OUTPATIENT)
Dept: INTERNAL MEDICINE | Facility: CLINIC | Age: 77
End: 2017-04-14

## 2017-04-17 NOTE — TELEPHONE ENCOUNTER
----- Message from Olivia Haines sent at 4/12/2017  1:52 PM CDT -----  Regarding: Questions about US  Contact: [phone]  Please call him regarding the US and symptoms he is having      Pt states he needs an appt with Dr Russ.  Charisma Carlson RN 3:22 PM on 4/17/2017.

## 2017-04-20 ENCOUNTER — HOSPITAL ENCOUNTER (OUTPATIENT)
Dept: PHYSICAL THERAPY | Facility: CLINIC | Age: 77
Setting detail: THERAPIES SERIES
End: 2017-04-20
Attending: INTERNAL MEDICINE
Payer: MEDICARE

## 2017-04-20 PROCEDURE — 97110 THERAPEUTIC EXERCISES: CPT | Mod: GP | Performed by: PHYSICAL THERAPIST

## 2017-04-20 PROCEDURE — 97112 NEUROMUSCULAR REEDUCATION: CPT | Mod: GP | Performed by: PHYSICAL THERAPIST

## 2017-04-20 PROCEDURE — 40000719 ZZHC STATISTIC PT DEPARTMENT NEURO VISIT: Performed by: PHYSICAL THERAPIST

## 2017-04-24 ENCOUNTER — OFFICE VISIT (OUTPATIENT)
Dept: INTERNAL MEDICINE | Facility: CLINIC | Age: 77
End: 2017-04-24

## 2017-04-24 VITALS
DIASTOLIC BLOOD PRESSURE: 78 MMHG | RESPIRATION RATE: 18 BRPM | SYSTOLIC BLOOD PRESSURE: 143 MMHG | BODY MASS INDEX: 33.15 KG/M2 | WEIGHT: 218 LBS | TEMPERATURE: 97.6 F | HEART RATE: 88 BPM

## 2017-04-24 DIAGNOSIS — E11.40 TYPE 2 DIABETES MELLITUS WITH DIABETIC NEUROPATHY, WITHOUT LONG-TERM CURRENT USE OF INSULIN (H): ICD-10-CM

## 2017-04-24 DIAGNOSIS — N39.41 URGENCY INCONTINENCE: Primary | ICD-10-CM

## 2017-04-24 DIAGNOSIS — I73.9 CLAUDICATION (H): ICD-10-CM

## 2017-04-24 RX ORDER — SOLIFENACIN SUCCINATE 5 MG/1
5 TABLET, FILM COATED ORAL DAILY
Qty: 60 TABLET | Refills: 3 | Status: SHIPPED | OUTPATIENT
Start: 2017-04-24 | End: 2018-01-03

## 2017-04-24 ASSESSMENT — PAIN SCALES - GENERAL: PAINLEVEL: NO PAIN (0)

## 2017-04-24 NOTE — NURSING NOTE
Chief Complaint   Patient presents with     Numbness     Here for numbness of both leg, worser on left leg     Len Jorgensen CMA at 8:39 AM on 4/24/2017

## 2017-04-24 NOTE — MR AVS SNAPSHOT
After Visit Summary   4/24/2017    Rolando Gonzalez    MRN: [id number]           Patient Information     Date Of Birth          1940        Visit Information        Provider Department      4/24/2017 8:40 AM Denise Russ MD [facility]        Today's Diagnoses     Urgency incontinence    -  1      Care Instructions    Primary Care Center Medication Refill Request Information:  * Please contact your pharmacy regarding ANY request for medication refills.  ** [facility] Prescription Fax = [phone]  * Please allow 3 business days for routine medication refills.  * Please allow 5 business days for controlled substance medication refills.     Primary Care Center Test Result notification information:  *You will be notified with in 7-10 days of your appointment day regarding the results of your test.  If you are on MyChart you will be notified as soon as the provider has reviewed the results and signed off on them.    [facility] [phone]           Follow-ups after your visit        Follow-up notes from your care team     Return in about 6 months (around 10/24/2017).      Your next 10 appointments already scheduled     Apr 27, 2017 10:00 AM CDT   Treatment 45 with Louise Sutherland, PT   [facility] ([facility])    [address] [place] [address]  [address]  [place] [address]   [phone]            May 04, 2017 10:45 AM CDT   Treatment 45 with Louise Sutherland, PT   [facility] ([facility])    [address] [place] [address]  [address]  [facility] [address]   [phone]            May 11, 2017 10:00 AM CDT   Treatment 45 with Louise Sutherland, PT   [facility] ([facility])    [address] [place] [address]  [address]  [facility] [address]   [phone]            May 18, 2017 11:00 AM CDT   Treatment 45 with Louise Sutherland, PT   [facility] ([facility])    [address] [place] [address]  [address]  [place] [address]   [phone]            May 25, 2017 10:00 AM CDT   Treatment 45 with Louise Sutherland, PT   [facility] ([facility])    [address]  [address]  [place] [address]   [phone]            May 31, 2017  8:15 AM CDT   (Arrive by 8:00 AM)   NEW RHINOLOGY with Cady Redd MD   [facility] ([facility])    [address] [place] [address]  4th Floor  [facility] [address]   [phone]            Jun 01, 2017 10:30 AM CDT   Treatment 45 with Louise Sutherland, PT   [facility] ([facility])    [address]  [address]  [place] [address]   [phone]              Who to contact     Please call your clinic at [phone] to:    Ask questions about your health    Make or cancel appointments    Discuss your medicines    Learn about your test results    Speak to your doctor   If you have compliments or concerns about an experience at your clinic, or if you wish to file a complaint, please contact [facility] Patient Relations at [phone] or email us at [email or web address]         Additional Information About Your Visit        [employer] Information     [employer] gives you secure access to your electronic health record. If you see a primary care provider, you can also send messages to your care team and make appointments. If you have questions, please call your primary care clinic.  If you do not have a primary care provider, please call [phone] and they will assist you.      [employer] is an electronic gateway that provides easy, online access to your medical records. With [employer], you can request a clinic appointment, read your test results, renew a prescription or communicate with your care team.     To access your existing account, please contact your [facility] or call [phone] for assistance.        Care  EveryWhere ID     This is your Care EveryWhere ID. This could be used by other organizations to access your [facility] medical records  [id number]        Your Vitals Were     Pulse Temperature Respirations BMI (Body Mass Index)          88 97.6  F (36.4  C) (Oral) 18 33.15 kg/m2         Blood Pressure from Last 3 Encounters:   04/24/17 143/78   03/27/17 122/78   12/20/16 148/77    Weight from Last 3 Encounters:   04/24/17 98.9 kg (218 lb)   03/27/17 98.4 kg (217 lb)   12/20/16 101.2 kg (223 lb)              Today, you had the following     No orders found for display         Today's Medication Changes          These changes are accurate as of: 4/24/17  9:03 AM.  If you have any questions, ask your nurse or doctor.               Start taking these medicines.        Dose/Directions    solifenacin 5 MG tablet   Commonly known as:  VESICARE   Used for:  Urgency incontinence   Started by:  Denise Russ MD        Dose:  5 mg   Take 1 tablet (5 mg) by mouth daily   Quantity:  60 tablet   Refills:  3         Stop taking these medicines if you haven't already. Please contact your care team if you have questions.     sertraline 50 MG tablet   Commonly known as:  ZOLOFT   Stopped by:  Denise Russ MD                Where to get your medicines      These medications were sent to [facility] - [place], [place] - [address]  [address], [facility] [address]     Phone:  [phone]     solifenacin 5 MG tablet                Primary Care Provider Office Phone # Fax #    Denise Russ [phone]        [facility] [address] [facility] [address]  [facility] [address]        Thank you!     Thank you for choosing [facility]  for your care. Our goal is always to provide you with excellent care. Hearing back from our patients is one way we can continue to improve our services. Please take a few minutes to complete the written survey that you may receive in the mail  after your visit with us. Thank you!             Your Updated Medication List - Protect others around you: Learn how to safely use, store and throw away your medicines at www.disposemymeds.org.          This list is accurate as of: 4/24/17  9:03 AM.  Always use your most recent med list.                   Brand Name Dispense Instructions for use    albuterol 108 (90 BASE) MCG/ACT Inhaler    PROAIR HFA/PROVENTIL HFA/VENTOLIN HFA     Inhale 2 puffs into the lungs every 6 hours       allopurinol 300 MG tablet    ZYLOPRIM    100 tablet    Take 1 tablet (300 mg) by mouth daily       amLODIPine 10 MG tablet    NORVASC    90 tablet    Take 1 tablet (10 mg) by mouth daily       aspirin 81 MG tablet      Take by mouth daily       BIOTENE DRY MOUTH Pste      Apply 1 spray to affected area       blood glucose monitoring test strip    ACCU-CHEK MERY    200 each    Test 2 times daily       camphor-menthol 0.5-0.5 % Lotn    DERMASARRA    1 Bottle    Applied to skin 3 times day p.r.n. for itching       celecoxib 200 MG capsule    celeBREX    180 capsule    Take 1 capsule (200 mg) by mouth daily       chlorthalidone 25 MG tablet    HYGROTON    60 tablet    Take one half tablet every morning.  Discontinue the hydrochlorothiazide 12.5 mg       cyclobenzaprine 5 MG tablet    FLEXERIL    42 tablet    Take one or two daily as needed for severe neck neck pain.       fluticasone 110 MCG/ACT Inhaler    FLOVENT HFA    1 Inhaler    Inhale 2 puffs into the lungs 2 times daily       fluticasone 50 MCG/ACT spray    FLONASE     2 sprays by Both Nostrils route daily.       fluticasone-salmeterol 500-50 MCG/DOSE diskus inhaler    ADVAIR    60 Inhaler    Inhale 1 puff into the lungs 2 times daily       gabapentin 300 MG capsule    NEURONTIN    360 capsule    Take 2 capsules (600 mg) by mouth 2 times daily       glipiZIDE 10 MG tablet    GLUCOTROL    180 tablet    Take 1 tablet (10 mg) by mouth 2 times daily       HYDROcodone-acetaminophen 5-325 MG  per tablet    NORCO    10 tablet    Take 1 tablet by mouth nightly as needed for moderate to severe pain       indomethacin 50 MG capsule    INDOCIN    30 capsule    Take 1 capsule (50 mg) by mouth 3 times daily as needed Use only during a gout attack       insulin glargine 100 UNIT/ML injection    LANTUS SOLOSTAR    30 mL    Inject 30 Units Subcutaneous 2 times daily       insulin pen needle 31G X 8 MM    B-D U/F    200 each    Use twice daily or as directed.       losartan 100 MG tablet    COZAAR    90 tablet    Take 1 tablet (100 mg) by mouth daily       metFORMIN modified 500 MG 24 hr tablet    GLUMETZA    450 tablet    Take 2 tablets every morning, 1 tablet at noon, and 2 tablets at bedtime.       omeprazole 40 MG capsule    priLOSEC    180 capsule    Take 1 capsule (40 mg) by mouth 2 times daily       saw palmetto 160 MG Caps      Take 160 mg by mouth daily       simvastatin 20 MG tablet    ZOCOR    90 tablet    Take 1 tablet (20 mg) by mouth At Bedtime       solifenacin 5 MG tablet    VESICARE    60 tablet    Take 1 tablet (5 mg) by mouth daily       tiotropium 18 MCG capsule    SPIRIVA HANDIHALER    30 capsule    Inhale contents of one capsule daily.       Vitamin D-3 1000 UNITS Caps      Take 1,000 Units by mouth daily

## 2017-04-24 NOTE — PROGRESS NOTES
"Cc:  Leg numbness   HPI:  Sumit is a 76-year-old man with type 2 diabetes, hypertension, hyperlipidemia and abnormal FRANCESCO obtained after learning that he had exercise-induced leg pain described as soreness in her thighs and hips. It improves with rest.  Initial FRANCESCO ultrasound was abnormal so we did arterial ultrasound today and the results are entirely normal.  I discussed those results with him and his friend, who Accompanies Him Today.  He is doing physical therapy exercises that he showed me and he finds it isn't very helpful and reduce his pain considerably. This is encouraging.    Depression: Sertraline 50 mg made him \"high as a type\" according to have encouraged her to stop the medication right away. His mood has improved. He is getting out and getting exercise daily, eating well, and sleeping well.    Urgent incontinence:  Process to the bathroom several times per day. No dysuria or hematuria. Wants to try Vesicare.    Diabetes mellitus type II: Fully addressed last visit with labs up-to-date.    Past Medical History:   Diagnosis Date     Degenerative tear of meniscus      Diabetes mellitus type 2, insulin dependent (H)      Diabetic neuropathy (H)     improved with gabapentin     Diverticulosis      ED (erectile dysfunction)      Edema extremities 2/15/16    venous insufficiency     Gout      Hyperlipidemia      Hypertension      Moderate persistent asthma 2017    MILLI Álvarez MD allergist.  FEV1 1.83. FEF 25-75 1.5 (3/13/17)     Osteoarthritis, shoulder     right     RLL lung nodule 03/20/2017    stable 8 mm rll nodule.  No additional CT testing needed.       Past Surgical History:   Procedure Laterality Date     CATARACT IOL, RT/LT      both eyes     L4-5 fusion        ROS: 4 point ROS neg other than the symptoms noted above in the HPI.    /78 (BP Location: Right arm, Patient Position: Chair, Cuff Size: Adult Regular)  Pulse 88  Temp 97.6  F (36.4  C) (Oral)  Resp 18  Wt 98.9 kg (218 lb)  BMI 33.15 " kg/m2   No physical exam today      ASSESSMENT  1.  Low back pain with sciatica bilateral, status post back surgery. Has claudication symptoms but arterial ultrasound is normal.  Therefore his pain is consistent with spinal stenosis. Plan continue physical therapy exercises and walks daily. This is HELPING very much.    2.  Urge incontinence. Will start Vesicare 5 mg daily. Side effects reviewed    3.  Grief/depression. He has discontinued the sertraline and is currently doing much better so we will stop the medication and metastases. Follow-up p.r.n. and in 6 months.    I spent a total of 15 minutes face-to-face with Rolando Gonzalez during today's office visit.  Over 50% of this time was spent counseling the patient and/or coordinating care regarding back pain with hip and thigh pain bilaterally.  See note for details.

## 2017-04-24 NOTE — PATIENT INSTRUCTIONS
[facility] Medication Refill Request Information:  * Please contact your pharmacy regarding ANY request for medication refills.  ** [facility] Prescription Fax = [phone]  * Please allow 3 business days for routine medication refills.  * Please allow 5 business days for controlled substance medication refills.     [facility] Test Result notification information:  *You will be notified with in 7-10 days of your appointment day regarding the results of your test.  If you are on MyChart you will be notified as soon as the provider has reviewed the results and signed off on them.    [facility] [phone]

## 2017-04-27 ENCOUNTER — HOSPITAL ENCOUNTER (OUTPATIENT)
Dept: PHYSICAL THERAPY | Facility: CLINIC | Age: 77
Setting detail: THERAPIES SERIES
End: 2017-04-27
Attending: INTERNAL MEDICINE
Payer: MEDICARE

## 2017-04-27 PROCEDURE — 40000719 ZZHC STATISTIC PT DEPARTMENT NEURO VISIT: Performed by: PHYSICAL THERAPIST

## 2017-04-27 PROCEDURE — 97110 THERAPEUTIC EXERCISES: CPT | Mod: GP | Performed by: PHYSICAL THERAPIST

## 2017-04-28 DIAGNOSIS — M10.9 GOUT, UNSPECIFIED: ICD-10-CM

## 2017-04-28 RX ORDER — ALLOPURINOL 300 MG/1
300 TABLET ORAL DAILY
Qty: 90 TABLET | Refills: 3 | Status: SHIPPED | OUTPATIENT
Start: 2017-04-28 | End: 2018-05-09

## 2017-04-28 NOTE — TELEPHONE ENCOUNTER
allopurinol (ZYLOPRIM) 300 MG tablet  Last Written Prescription Date:  10/3/16  Last Fill Quantity: 100,   # refills: 1  Last Office Visit with G, P or [facility] prescribing provider: 4/24/17  Future Office visit:   10/23/17    Creatinine   Date Value Ref Range Status   08/22/2016 1.69 (H) 0.66 - 1.25 mg/dL Final   ]  Lab Results   Component Value Date    WBC 11.2 05/31/2016     Lab Results   Component Value Date    RBC 4.30 05/31/2016     Lab Results   Component Value Date    HGB 14.0 12/20/2016     Lab Results   Component Value Date    HCT 38.6 05/31/2016     No components found for: MCT  Lab Results   Component Value Date    MCV 90 05/31/2016     Lab Results   Component Value Date    MCH 30.9 05/31/2016     Lab Results   Component Value Date    MCHC 34.5 05/31/2016     Lab Results   Component Value Date    RDW 12.9 05/31/2016     Lab Results   Component Value Date     05/31/2016         Routing refill request to provider for review/approval because:   allopurinol (ZYLOPRIM) 300 MG tablet, creat High.  [facility] labs due 5/17. Qty, rfs?

## 2017-05-03 RX ORDER — CELECOXIB 200 MG/1
200 CAPSULE ORAL DAILY
Qty: 90 CAPSULE | Refills: 3 | Status: SHIPPED | OUTPATIENT
Start: 2017-05-03 | End: 2018-05-30

## 2017-05-03 NOTE — TELEPHONE ENCOUNTER
celecoxib (CELEBREX) 200 MG capsule   Last Written Prescription Date:  1/12/17  Last Fill Quantity: 180,   # refills: 1  Last Office Visit with [facility], P or [facility] prescribing provider: 4/24/17  Future Office visit:   10/23/17    Creatinine   Date Value Ref Range Status   08/22/2016 1.69 (H) 0.66 - 1.25 mg/dL Final   ]  BP Readings from Last 3 Encounters:   04/24/17 143/78   03/27/17 122/78   12/20/16 148/77       Routing refill request to provider for review/approval because:   celecoxib (CELEBREX) 200 MG capsule. Creat H

## 2017-05-04 ENCOUNTER — HOSPITAL ENCOUNTER (OUTPATIENT)
Dept: PHYSICAL THERAPY | Facility: CLINIC | Age: 77
Setting detail: THERAPIES SERIES
End: 2017-05-04
Attending: INTERNAL MEDICINE
Payer: MEDICARE

## 2017-05-04 PROCEDURE — 97112 NEUROMUSCULAR REEDUCATION: CPT | Mod: GP | Performed by: PHYSICAL THERAPIST

## 2017-05-04 PROCEDURE — 97110 THERAPEUTIC EXERCISES: CPT | Mod: GP | Performed by: PHYSICAL THERAPIST

## 2017-05-04 PROCEDURE — 40000719 ZZHC STATISTIC PT DEPARTMENT NEURO VISIT: Performed by: PHYSICAL THERAPIST

## 2017-05-11 ENCOUNTER — HOSPITAL ENCOUNTER (OUTPATIENT)
Dept: PHYSICAL THERAPY | Facility: CLINIC | Age: 77
Setting detail: THERAPIES SERIES
End: 2017-05-11
Attending: INTERNAL MEDICINE
Payer: MEDICARE

## 2017-05-11 PROCEDURE — 97112 NEUROMUSCULAR REEDUCATION: CPT | Mod: GP | Performed by: PHYSICAL THERAPIST

## 2017-05-11 PROCEDURE — 40000719 ZZHC STATISTIC PT DEPARTMENT NEURO VISIT: Performed by: PHYSICAL THERAPIST

## 2017-05-11 PROCEDURE — 97110 THERAPEUTIC EXERCISES: CPT | Mod: GP | Performed by: PHYSICAL THERAPIST

## 2017-05-11 NOTE — TELEPHONE ENCOUNTER
Radiology reports received from [facility]. Notified the film room to pull images.  CT paranasal sinuses on 10/17/03, 12/29/03, 1/23/15

## 2017-05-16 DIAGNOSIS — R60.9 EDEMA, UNSPECIFIED TYPE: ICD-10-CM

## 2017-05-16 NOTE — TELEPHONE ENCOUNTER
chlorthalidone (HYGROTON) 25 MG tablet  Last Written Prescription Date:  8/22/16  Last Fill Quantity: 60,   # refills: 3  Last Office Visit with G, P or [facility] prescribing provider: 4/24/17  Future Office visit:   12/23/17    Potassium   Date Value Ref Range Status   08/22/2016 4.4 3.4 - 5.3 mmol/L Final   ]  Sodium 140  133 - 144 mmol/L Final 08/22/2016  4:33 PM 1740     Creatinine   Date Value Ref Range Status   08/22/2016 1.69 (H) 0.66 - 1.25 mg/dL Final   ]  BP Readings from Last 3 Encounters:   04/24/17 143/78   03/27/17 122/78   12/20/16 148/77         Routing refill request to provider for review/approval because:  chlorthalidone (HYGROTON) 25 MG tablet.  Rosi RUSHING

## 2017-05-18 ENCOUNTER — HOSPITAL ENCOUNTER (OUTPATIENT)
Dept: PHYSICAL THERAPY | Facility: CLINIC | Age: 77
Setting detail: THERAPIES SERIES
End: 2017-05-18
Attending: INTERNAL MEDICINE
Payer: MEDICARE

## 2017-05-18 PROCEDURE — 97112 NEUROMUSCULAR REEDUCATION: CPT | Mod: GP | Performed by: PHYSICAL THERAPIST

## 2017-05-18 PROCEDURE — 97110 THERAPEUTIC EXERCISES: CPT | Mod: GP | Performed by: PHYSICAL THERAPIST

## 2017-05-18 PROCEDURE — 40000719 ZZHC STATISTIC PT DEPARTMENT NEURO VISIT: Performed by: PHYSICAL THERAPIST

## 2017-05-18 RX ORDER — CHLORTHALIDONE 25 MG/1
TABLET ORAL
Qty: 45 TABLET | Refills: 3 | Status: SHIPPED | OUTPATIENT
Start: 2017-05-18 | End: 2018-09-15

## 2017-06-01 ENCOUNTER — HOSPITAL ENCOUNTER (OUTPATIENT)
Dept: PHYSICAL THERAPY | Facility: CLINIC | Age: 77
Setting detail: THERAPIES SERIES
End: 2017-06-01
Attending: INTERNAL MEDICINE
Payer: MEDICARE

## 2017-06-01 PROCEDURE — 40000719 ZZHC STATISTIC PT DEPARTMENT NEURO VISIT: Performed by: PHYSICAL THERAPIST

## 2017-06-01 PROCEDURE — 97112 NEUROMUSCULAR REEDUCATION: CPT | Mod: GP | Performed by: PHYSICAL THERAPIST

## 2017-06-01 PROCEDURE — 97110 THERAPEUTIC EXERCISES: CPT | Mod: GP | Performed by: PHYSICAL THERAPIST

## 2017-06-09 ENCOUNTER — HOSPITAL ENCOUNTER (OUTPATIENT)
Dept: PHYSICAL THERAPY | Facility: CLINIC | Age: 77
Setting detail: THERAPIES SERIES
End: 2017-06-09
Attending: INTERNAL MEDICINE
Payer: MEDICARE

## 2017-06-09 PROCEDURE — 40000185 ZZHC STATISTIC PT OUTPT VISIT: Performed by: PHYSICAL THERAPIST

## 2017-06-09 PROCEDURE — 97110 THERAPEUTIC EXERCISES: CPT | Mod: GP | Performed by: PHYSICAL THERAPIST

## 2017-06-12 ENCOUNTER — HOSPITAL ENCOUNTER (OUTPATIENT)
Dept: PHYSICAL THERAPY | Facility: CLINIC | Age: 77
Setting detail: THERAPIES SERIES
End: 2017-06-12
Attending: INTERNAL MEDICINE
Payer: MEDICARE

## 2017-06-12 PROCEDURE — 97110 THERAPEUTIC EXERCISES: CPT | Mod: GP | Performed by: PHYSICAL THERAPIST

## 2017-06-12 PROCEDURE — 40000719 ZZHC STATISTIC PT DEPARTMENT NEURO VISIT: Performed by: PHYSICAL THERAPIST

## 2017-06-20 DIAGNOSIS — E11.9 DIABETES MELLITUS TYPE 2, INSULIN DEPENDENT (H): ICD-10-CM

## 2017-06-20 DIAGNOSIS — Z79.4 DIABETES MELLITUS TYPE 2, INSULIN DEPENDENT (H): ICD-10-CM

## 2017-06-20 RX ORDER — METFORMIN HYDROCHLORIDE 500 MG/1
TABLET, FILM COATED, EXTENDED RELEASE ORAL
Qty: 450 TABLET | Refills: 3 | Status: SHIPPED | OUTPATIENT
Start: 2017-06-20 | End: 2018-07-31

## 2017-06-20 NOTE — TELEPHONE ENCOUNTER
metformin  Last Written Prescription Date:  11/15/16  Last Fill Quantity: 450,   # refills: 1  Last Office Visit : 4/24/17  Future Office visit:  10/23/17  Creatinine   Date Value Ref Range Status   08/22/2016 1.69 (H) 0.66 - 1.25 mg/dL Final     Lab Results   Component Value Date    A1C 7.1 04/13/2017     Lab Results   Component Value Date    MICROL 223 08/22/2016         Routing refill request to provider for review/approval because:  Routed to MD due to elevated creat.

## 2017-06-23 ENCOUNTER — HOSPITAL ENCOUNTER (OUTPATIENT)
Dept: PHYSICAL THERAPY | Facility: CLINIC | Age: 77
Setting detail: THERAPIES SERIES
End: 2017-06-23
Attending: INTERNAL MEDICINE
Payer: MEDICARE

## 2017-06-23 PROCEDURE — 40000185 ZZHC STATISTIC PT OUTPT VISIT: Performed by: PHYSICAL THERAPIST

## 2017-06-23 PROCEDURE — G8979 MOBILITY GOAL STATUS: HCPCS | Mod: GP,CJ | Performed by: PHYSICAL THERAPIST

## 2017-06-23 PROCEDURE — 97110 THERAPEUTIC EXERCISES: CPT | Mod: GP | Performed by: PHYSICAL THERAPIST

## 2017-06-23 PROCEDURE — G8980 MOBILITY D/C STATUS: HCPCS | Mod: GP,CK | Performed by: PHYSICAL THERAPIST

## 2017-06-23 NOTE — PROGRESS NOTES
Outpatient Physical Therapy Discharge Note     Patient: Rolando Gonzalez  : 1940    Beginning/End Dates of Reporting Period:  17 to 2017    Referring Provider: Denise Russ MD    Therapy Diagnosis: B LE weakness, impaired balance     Client Self Report: States that he feels like he is not improving. Likes doing the stretches but states that he is not motivated to do any exercise outside of therapy. Understands taht this is what he needs to do to improve however doesn't feel like he is ready to make this change now        Outcome Measures (most recent score):  Declined AMPA in final session    Goals:  Goal Identifier FGA   Goal Description Patient will improve FGA score to 22/30 in order to demonstrate increased safety with functional mobility   Target Date 17   Date Met      Progress: NOT MET     Goal Identifier 6MWT   Goal Description Patient will increase 6MWT by 150 feet in order to demonstrate increased walking tolerance.   Target Date 17   Date Met      Progress:NOT MET     Goal Identifier mCTSIB   Goal Description Patient will achieve 30 seconds in condition 2 of mCTSIB in order to demonstrate improved standing balance toelrance and promote safety with tasks like walking to the bathroom at night.    Target Date 17   Date Met      Progress:NOT MET     Goal Identifier HEP    Goal Description Patient will be independent in walking program at end of therapy session in order to maintain activity tolerance.    Target Date 17   Date Met      Progress: Understands however has not implemented        Progress Toward Goals:   Progress limited due to lack of carry over with HEP. Pt states that at this time he is not motivated to begin exercising outside of therapy. Discussed how  may be better fit at this time     Plan:  Discharge from therapy.    Discharge:    Reason for Discharge: Patient chooses to discontinue therapy.      Discharge Plan: Patient to continue  home program.

## 2017-06-27 ENCOUNTER — TRANSFERRED RECORDS (OUTPATIENT)
Dept: HEALTH INFORMATION MANAGEMENT | Facility: CLINIC | Age: 77
End: 2017-06-27

## 2017-07-10 DIAGNOSIS — E78.5 HYPERLIPEMIA: Primary | ICD-10-CM

## 2017-07-10 RX ORDER — SIMVASTATIN 20 MG
20 TABLET ORAL AT BEDTIME
Qty: 90 TABLET | Refills: 2 | Status: SHIPPED | OUTPATIENT
Start: 2017-07-10 | End: 2018-04-30

## 2017-07-10 NOTE — TELEPHONE ENCOUNTER
simvastatin (ZOCOR) 20 MG tablet      Last Written Prescription Date:  5/5/2016  Last Fill Quantity: 90,   # refills: 3  Last Office Visit : 4/24/2017  Future Office visit:  10/23/2017

## 2017-07-15 DIAGNOSIS — K21.9 ESOPHAGEAL REFLUX: ICD-10-CM

## 2017-07-15 DIAGNOSIS — I10 ESSENTIAL HYPERTENSION WITH GOAL BLOOD PRESSURE LESS THAN 130/80: ICD-10-CM

## 2017-07-15 RX ORDER — OMEPRAZOLE 40 MG/1
40 CAPSULE, DELAYED RELEASE ORAL 2 TIMES DAILY
Qty: 180 CAPSULE | Refills: 3 | Status: SHIPPED | OUTPATIENT
Start: 2017-07-15 | End: 2018-10-17

## 2017-07-15 NOTE — TELEPHONE ENCOUNTER
amLODIPine (NORVASC) 10 MG tablet    Last Written Prescription Date:  6/7/16  Last Fill Quantity: 90,   # refills: 3  Last Office Visit with [facility], P or [facility] prescribing provider: 4/24/17  Future Office visit:   10/23/17    Potassium   Date Value Ref Range Status   08/22/2016 4.4 3.4 - 5.3 mmol/L Final   ]  Creatinine   Date Value Ref Range Status   08/22/2016 1.69 (H) 0.66 - 1.25 mg/dL Final   ]  BP Readings from Last 3 Encounters:   04/24/17 143/78   03/27/17 122/78   12/20/16 148/77     omeprazole (PRILOSEC) 40 MG capsule      Last Written Prescription Date:  9/30/16  Last Fill Quantity: 180,   # refills: 1      Routing refill request to provider for review/approval because:    amLODIPine (NORVASC) 10 MG tablet .   Creat H.   Labs  due next  mth.

## 2017-07-19 RX ORDER — AMLODIPINE BESYLATE 10 MG/1
10 TABLET ORAL DAILY
Qty: 90 TABLET | Refills: 3 | Status: SHIPPED | OUTPATIENT
Start: 2017-07-19 | End: 2018-07-31

## 2017-07-28 DIAGNOSIS — I10 ESSENTIAL HYPERTENSION WITH GOAL BLOOD PRESSURE LESS THAN 130/80: ICD-10-CM

## 2017-07-28 NOTE — TELEPHONE ENCOUNTER
losartan  Last Written Prescription Date: 6/7/16  Last Fill Quantity: 90, # refills: 3  Last Office Visit : 4/24/17  Next Clinic Visit: 10/23/17         Potassium   Date Value Ref Range Status   08/22/2016 4.4 3.4 - 5.3 mmol/L Final     Creatinine   Date Value Ref Range Status   08/22/2016 1.69 (H) 0.66 - 1.25 mg/dL Final     BP Readings from Last 3 Encounters:   04/24/17 143/78   03/27/17 122/78   12/20/16 148/77     Potassium   Date Value Ref Range Status   08/22/2016 4.4 3.4 - 5.3 mmol/L Final        Routing to RN due to elevated Creat.

## 2017-07-31 RX ORDER — LOSARTAN POTASSIUM 100 MG/1
100 TABLET ORAL DAILY
Qty: 90 TABLET | Refills: 3 | Status: SHIPPED | OUTPATIENT
Start: 2017-07-31 | End: 2018-08-09

## 2017-08-03 ENCOUNTER — TRANSFERRED RECORDS (OUTPATIENT)
Dept: HEALTH INFORMATION MANAGEMENT | Facility: CLINIC | Age: 77
End: 2017-08-03

## 2017-08-04 DIAGNOSIS — E11.21 DIABETIC NEPHROPATHY (H): ICD-10-CM

## 2017-08-04 RX ORDER — GABAPENTIN 300 MG/1
600 CAPSULE ORAL 2 TIMES DAILY
Qty: 360 CAPSULE | Refills: 3 | Status: SHIPPED | OUTPATIENT
Start: 2017-08-04 | End: 2018-11-15

## 2017-08-04 NOTE — TELEPHONE ENCOUNTER
gabapentin (NEURONTIN) 300 MG capsule      Last Written Prescription Date:  1/12/2017  Last Fill Quantity: 360,   # refills: 3    glipiZIDE (GLUCOTROL) 10 MG tablet      Last Written Prescription Date:  1/12/2017  Last Fill Quantity: 180,   # refills: 3    Creatinine   Date Value Ref Range Status   08/22/2016 1.69 (H) 0.66 - 1.25 mg/dL Final   ]  Lab Results   Component Value Date    A1C 7.1 04/13/2017     Lab Results   Component Value Date    MICROL 223 08/22/2016        Last Office Visit : 4/24/2017  Future Office visit:  10/23/2017    Routing refill request for glipizide to provider for review/approval because:  Abnormal lab Creatinine high = 1.69 (0.66-1.29)

## 2017-08-05 RX ORDER — GLIPIZIDE 10 MG/1
10 TABLET ORAL 2 TIMES DAILY
Qty: 180 TABLET | Refills: 1 | Status: SHIPPED | OUTPATIENT
Start: 2017-08-05 | End: 2018-01-25

## 2017-08-23 NOTE — TELEPHONE ENCOUNTER
insulin glargine (LANTUS SOLOSTAR) 100 UNIT/ML injection  Last Written Prescription Date:  4/8/17  Last Fill Quantity: 30ml,   # refills: 1  Last Office Visit with [facility] or [facility] prescribing provider: 4/24/17  Future Office visit:   10/23/17    Lab Results   Component Value Date    A1C 7.1 04/13/2017    A1C 6.9 08/22/2016    A1C 9.6 05/31/2016    A1C 7.3 03/22/2016    A1C 6.7 08/25/2015     Lab Results   Component Value Date    MICROL 223 08/22/2016     No results found for: MICROALBUMIN      Routing refill request to provider for review/approval because:   insulin glargine (LANTUS SOLOSTAR) 100 UNIT/ML injection.microalbumin past due. Per protocol insulin sent to pharmacy fyi to DR Russ.

## 2017-09-12 ENCOUNTER — TRANSFERRED RECORDS (OUTPATIENT)
Dept: HEALTH INFORMATION MANAGEMENT | Facility: CLINIC | Age: 77
End: 2017-09-12

## 2017-10-02 ENCOUNTER — TRANSFERRED RECORDS (OUTPATIENT)
Dept: HEALTH INFORMATION MANAGEMENT | Facility: CLINIC | Age: 77
End: 2017-10-02

## 2017-11-07 ENCOUNTER — OFFICE VISIT (OUTPATIENT)
Dept: INTERNAL MEDICINE | Facility: CLINIC | Age: 77
End: 2017-11-07

## 2017-11-07 VITALS
WEIGHT: 225.3 LBS | DIASTOLIC BLOOD PRESSURE: 77 MMHG | HEART RATE: 89 BPM | OXYGEN SATURATION: 97 % | BODY MASS INDEX: 34.26 KG/M2 | SYSTOLIC BLOOD PRESSURE: 131 MMHG

## 2017-11-07 DIAGNOSIS — Z79.4 DIABETES MELLITUS TYPE 2, INSULIN DEPENDENT (H): ICD-10-CM

## 2017-11-07 DIAGNOSIS — R30.0 DYSURIA: ICD-10-CM

## 2017-11-07 DIAGNOSIS — E11.9 DIABETES MELLITUS TYPE 2, INSULIN DEPENDENT (H): ICD-10-CM

## 2017-11-07 DIAGNOSIS — J45.40 MODERATE PERSISTENT ASTHMA WITHOUT COMPLICATION: ICD-10-CM

## 2017-11-07 DIAGNOSIS — Z12.5 SCREENING FOR PROSTATE CANCER: ICD-10-CM

## 2017-11-07 DIAGNOSIS — R30.0 DYSURIA: Primary | ICD-10-CM

## 2017-11-07 LAB
ALBUMIN UR-MCNC: 30 MG/DL
ANION GAP SERPL CALCULATED.3IONS-SCNC: 8 MMOL/L (ref 3–14)
APPEARANCE UR: CLEAR
BILIRUB UR QL STRIP: NEGATIVE
BUN SERPL-MCNC: 25 MG/DL (ref 7–30)
CALCIUM SERPL-MCNC: 8.4 MG/DL (ref 8.5–10.1)
CHLORIDE SERPL-SCNC: 108 MMOL/L (ref 94–109)
CO2 SERPL-SCNC: 21 MMOL/L (ref 20–32)
COLOR UR AUTO: YELLOW
CREAT SERPL-MCNC: 1.79 MG/DL (ref 0.66–1.25)
CREAT UR-MCNC: 223 MG/DL
GFR SERPL CREATININE-BSD FRML MDRD: 37 ML/MIN/1.7M2
GLUCOSE SERPL-MCNC: 161 MG/DL (ref 70–99)
GLUCOSE UR STRIP-MCNC: NEGATIVE MG/DL
HBA1C MFR BLD: 5.8 % (ref 4.3–6)
HGB UR QL STRIP: NEGATIVE
HYALINE CASTS #/AREA URNS LPF: 8 /LPF (ref 0–2)
KETONES UR STRIP-MCNC: NEGATIVE MG/DL
LEUKOCYTE ESTERASE UR QL STRIP: NEGATIVE
MICROALBUMIN UR-MCNC: 101 MG/L
MICROALBUMIN/CREAT UR: 45.29 MG/G CR (ref 0–17)
MUCOUS THREADS #/AREA URNS LPF: PRESENT /LPF
NITRATE UR QL: NEGATIVE
PH UR STRIP: 5 PH (ref 5–7)
POTASSIUM SERPL-SCNC: 3.9 MMOL/L (ref 3.4–5.3)
PSA SERPL-ACNC: 2.97 UG/L (ref 0–4)
RBC #/AREA URNS AUTO: 0 /HPF (ref 0–2)
SODIUM SERPL-SCNC: 138 MMOL/L (ref 133–144)
SOURCE: ABNORMAL
SP GR UR STRIP: 1.02 (ref 1–1.03)
UROBILINOGEN UR STRIP-MCNC: 2 MG/DL (ref 0–2)
WBC #/AREA URNS AUTO: 2 /HPF (ref 0–2)

## 2017-11-07 RX ORDER — BUDESONIDE AND FORMOTEROL FUMARATE DIHYDRATE 80; 4.5 UG/1; UG/1
2 AEROSOL RESPIRATORY (INHALATION) 2 TIMES DAILY
Qty: 1 INHALER | Refills: 1 | COMMUNITY
Start: 2017-11-07

## 2017-11-07 ASSESSMENT — PAIN SCALES - GENERAL: PAINLEVEL: NO PAIN (0)

## 2017-11-07 NOTE — PROGRESS NOTES
Chief complaint:    History of present illness: Geovanny is a 76-year-old man with a past medical history of hypertension, diabetes mellitus type 2, major depression, and moderate persistent asthma who presents for follow-up. He has several concerns today:    1.  Moderate persistent asthma. He saw a doctor Kaitlynn recently and was very impressed with his care. Dr. White discontinued the Advair and substituted Symbicort which has been working beautifully. He has less shortness of breath, no coughing or wheezing.    2.  Burning on urination. He describes a several week history of extensive rawness in the distal 4 inches of his penis without discharge or redness, hematuria or cloudy urine. He is not urinating frequently.  He has no history of kidney stones.  His nocturia has completely remitted  Since being treated with Vesicare. He also uses saw palmetto for BPH. He is not sexually active and has no testicular changes or urethral ulcerations or vesicles.    3.  Diabetes mellitus type 2. He says his control is excellent. When he checks his morning glucose is 120 to 130. He does not have urinary frequency.  He has a slight sense of discomfort in his feet bilaterally but no consistent burning. He has noticed a lesion on the bottom of his right foot that's been present for about a year.    4.  Healthcare maintenance:  He is up-to-date on vaccinations    Past Medical History:   Diagnosis Date     Degenerative tear of meniscus      Diabetes mellitus type 2, insulin dependent (H)      Diabetic neuropathy (H)     improved with gabapentin     Diverticulosis      ED (erectile dysfunction)      Edema extremities 2/15/16    venous insufficiency     Gout      Hyperlipidemia      Hypertension      Moderate persistent asthma 2017    MILLI Álvarez MD allergist.  FEV1 1.83. FEF 25-75 1.5 (3/13/17)     Osteoarthritis, shoulder     right     RLL lung nodule 03/20/2017    stable 8 mm rll nodule.  No additional CT testing needed.       Urge  incontinence       ROS: 10 point ROS neg other than the symptoms noted above in the HPI.    Family History   Problem Relation Age of Onset     CANCER Paternal Grandmother      /77  Pulse 89  Wt 102.2 kg (225 lb 4.8 oz)  SpO2 97%  BMI 34.26 kg/m2  Exam:  Constitutional: Delightful 76-year-old man  Cardiovascular: JVP is 7. Regular rate and rhythm, S1 and S2 without murmur. No peripheral edema.  Respiratory: negative, Percussion normal. Good diaphragmatic excursion. Lungs clear  Gastrointestinal: Abdomen soft, non-tender. BS normal. No masses, organomegaly  : Rectal examination reveals a slightly asymmetric and slightly enlarged smooth nontender nonnodular prostate.  Hematologic/Lymphatic/Immunologic: Normal cervical lymph nodes  Diabetic foot exam:  On the plantar surface of the right foot over the fifth metatarsal region there are 4 or 5 tiny reddish dots that look like warts but there is no verrucous excrescence. There is no ulceration or surrounding redness and no drainage. Sensory exam with monofilament shows patches of decreased sensation along the lower calves bilaterally culminating in a sock-like distribution of numbness and decreased sensation bilaterally. DP and PT pulses are normal bilaterally and there is normal capillary refill. There is no asymmetric callus formation or ulceration.    ASSESSMENT  1.  Moderate persistent asthma. Doing well on current regimen. Up-to-date in immunizations.  2.  Type 2 diabetes. Reasonable control of glucose at home. Due for labs today.  Complicated by mild diabetic neuropathy, stable. Uses gabapentin 600 mg by mouth twice daily  3.  Painful urination. Plan check UA for infection, bleeding.  Prostate is enlarged but does not feel malignant or infected.    Rolando was seen today for recheck.    Diagnoses and all orders for this visit:    Dysuria  -     UA with Micro reflex to Culture; Future    Diabetes mellitus type 2, insulin dependent (H)  -     Hemoglobin A1c;  Future  -     Basic metabolic panel; Future  -     Albumin Random Urine Quantitative with Creat Ratio    Screening for prostate cancer  -     PSA screen; Future

## 2017-11-07 NOTE — MR AVS SNAPSHOT
After Visit Summary   11/7/2017    Rolando Gonzalez    MRN: [id number]           Patient Information     Date Of Birth          1940        Visit Information        Provider Department      11/7/2017 4:00 PM Denise Russ MD [facility]        Today's Diagnoses     Dysuria    -  1    Diabetes mellitus type 2, insulin dependent (H)        Screening for prostate cancer          Care Instructions    Follow up in six months, [phone].    Please go to the lab on the first floor before you leave.           Follow-ups after your visit        Follow-up notes from your care team     Return in about 6 months (around 5/7/2018).      Your next 10 appointments already scheduled     Nov 07, 2017  5:00 PM CST   LAB with  LAB   [facility] ([facility])    [address] [place] [id number] [phone]           Please do not eat 10-12 hours before your appointment if you are coming in fasting for labs on lipids, cholesterol, or glucose (sugar). This does not apply to pregnant women. Water, hot tea and black coffee (with nothing added) are okay. Do not drink other fluids, diet soda or chew gum.              Future tests that were ordered for you today     Open Future Orders        Priority Expected Expires Ordered    PSA screen Routine 11/7/2017 11/7/2018 11/7/2017    Hemoglobin A1c Routine 11/7/2017 11/21/2017 11/7/2017    Basic metabolic panel Routine 11/7/2017 11/21/2017 11/7/2017    UA with Micro reflex to Culture Routine 11/7/2017 11/7/2018 11/7/2017            Who to contact     Please call your clinic at [phone] to:    Ask questions about your health    Make or cancel appointments    Discuss your medicines    Learn about your test results    Speak to your doctor   If you have compliments or concerns about an experience at your clinic, or if you wish to file a complaint, please contact [facility]  Patient Relations at [phone] or email us at [email or web address]         Additional Information About Your Visit        [employer] Information     AppSame gives you secure access to your electronic health record. If you see a primary care provider, you can also send messages to your care team and make appointments. If you have questions, please call your primary care clinic.  If you do not have a primary care provider, please call [phone] and they will assist you.      AppSame is an electronic gateway that provides easy, online access to your medical records. With [employer], you can request a clinic appointment, read your test results, renew a prescription or communicate with your care team.     To access your existing account, please contact your [facility] or call [phone] for assistance.        Care EveryWhere ID     This is your Care EveryWhere ID. This could be used by other organizations to access your [facility] medical records  [id number]        Your Vitals Were     Pulse Pulse Oximetry BMI (Body Mass Index)             89 97% 34.26 kg/m2          Blood Pressure from Last 3 Encounters:   11/07/17 131/77   04/24/17 143/78   03/27/17 122/78    Weight from Last 3 Encounters:   11/07/17 102.2 kg (225 lb 4.8 oz)   04/24/17 98.9 kg (218 lb)   03/27/17 98.4 kg (217 lb)              We Performed the Following     Albumin Random Urine Quantitative with Creat Ratio          Today's Medication Changes          These changes are accurate as of: 11/7/17  4:46 PM.  If you have any questions, ask your nurse or doctor.               Stop taking these medicines if you haven't already. Please contact your care team if you have questions.     fluticasone-salmeterol 500-50 MCG/DOSE diskus inhaler   Commonly known as:  ADVAIR   Stopped by:  Denise Russ MD                    Primary Care Provider Office Phone # Fax #    Denise Russ [phone]        [address] [facility] [address]  [facility] [address]        Equal Access to Services     FERMINIBIS RAINA : Hadii villa herrera filibertothomas Jessicaena, wamolinada graceannabelleha, qamary kateta murtazakelleybossman vidal, yan calderónmarymanish goldberg. So [facility] [phone].    ATENCIÓN: Si habla español, tiene a redman disposición servicios gratuitos de asistencia lingüística. Llame al [phone].    We comply with applicable federal civil rights laws and [place] laws. We do not discriminate on the basis of race, color, national origin, age, disability, sex, sexual orientation, or gender identity.            Thank you!     Thank you for choosing [facility]  for your care. Our goal is always to provide you with excellent care. Hearing back from our patients is one way we can continue to improve our services. Please take a few minutes to complete the written survey that you may receive in the mail after your visit with us. Thank you!             Your Updated Medication List - Protect others around you: Learn how to safely use, store and throw away your medicines at www.disposemymeds.org.          This list is accurate as of: 11/7/17  4:46 PM.  Always use your most recent med list.                   Brand Name Dispense Instructions for use Diagnosis    albuterol 108 (90 BASE) MCG/ACT Inhaler    PROAIR HFA/PROVENTIL HFA/VENTOLIN HFA     Inhale 2 puffs into the lungs every 6 hours        allopurinol 300 MG tablet    ZYLOPRIM    90 tablet    Take 1 tablet (300 mg) by mouth daily    Gout, unspecified       amLODIPine 10 MG tablet    NORVASC    90 tablet    Take 1 tablet (10 mg) by mouth daily    Essential hypertension with goal blood pressure less than 130/80       aspirin 81 MG tablet      Take by mouth daily    Hearing loss, unspecified laterality, Fatigue, Diabetes mellitus type 2, insulin dependent (H), Hyperlipidemia, COPD (chronic obstructive pulmonary disease) (H)       BIOTENE DRY MOUTH Pste      Apply 1 spray to affected area         blood glucose monitoring test strip    ACCU-CHEK MERY    200 each    Test 2 times daily    Diabetes mellitus (H)       camphor-menthol 0.5-0.5 % Lotn    DERMASARRA    1 Bottle    Applied to skin 3 times day p.r.n. for itching    Itching       celecoxib 200 MG capsule    celeBREX    90 capsule    Take 1 capsule (200 mg) by mouth daily    Diabetes mellitus type 2, uncontrolled (H)       chlorthalidone 25 MG tablet    HYGROTON    45 tablet    Take one half tablet every morning.  Discontinue the hydrochlorothiazide 12.5 mg    Edema, unspecified type       cyclobenzaprine 5 MG tablet    FLEXERIL    42 tablet    Take one or two daily as needed for severe neck neck pain.    Neck pain       fluticasone 110 MCG/ACT Inhaler    FLOVENT HFA    1 Inhaler    Inhale 2 puffs into the lungs 2 times daily    Asthma       fluticasone 50 MCG/ACT spray    FLONASE     2 sprays by Both Nostrils route daily.    Diabetes mellitus type 2, uncontrolled (H), Hyperlipidemia       gabapentin 300 MG capsule    NEURONTIN    360 capsule    Take 2 capsules (600 mg) by mouth 2 times daily    Diabetic nephropathy (H)       glipiZIDE 10 MG tablet    GLUCOTROL    180 tablet    Take 1 tablet (10 mg) by mouth 2 times daily    Diabetes mellitus type 2, uncontrolled (H)       HYDROcodone-acetaminophen 5-325 MG per tablet    NORCO    10 tablet    Take 1 tablet by mouth nightly as needed for moderate to severe pain    Rib pain       indomethacin 50 MG capsule    INDOCIN    30 capsule    Take 1 capsule (50 mg) by mouth 3 times daily as needed Use only during a gout attack    Gout, unspecified       insulin glargine 100 UNIT/ML injection    LANTUS SOLOSTAR    30 mL    Inject 30 Units Subcutaneous 2 times daily    Diabetes mellitus type 2, uncontrolled (H)       insulin pen needle 31G X 8 MM    B-D U/F    200 each    Use twice daily or as directed.    Diabetes mellitus type 2, uncontrolled (H)       losartan 100 MG tablet    COZAAR    90 tablet    Take 1  tablet (100 mg) by mouth daily    Essential hypertension with goal blood pressure less than 130/80       metFORMIN modified 500 MG 24 hr tablet    GLUMETZA    450 tablet    Take 2 tablets every morning, 1 tablet at noon, and 2 tablets at bedtime.    Diabetes mellitus type 2, insulin dependent (H)       omeprazole 40 MG capsule    priLOSEC    180 capsule    Take 1 capsule (40 mg) by mouth 2 times daily    Esophageal reflux       saw palmetto 160 MG Caps      Take 160 mg by mouth daily        simvastatin 20 MG tablet    ZOCOR    90 tablet    Take 1 tablet (20 mg) by mouth At Bedtime    Hyperlipemia       solifenacin 5 MG tablet    VESICARE    60 tablet    Take 1 tablet (5 mg) by mouth daily    Urgency incontinence       tiotropium 18 MCG capsule    SPIRIVA HANDIHALER    30 capsule    Inhale contents of one capsule daily.    Intermittent asthma       Vitamin D-3 1000 UNITS Caps      Take 1,000 Units by mouth daily

## 2017-11-28 ENCOUNTER — TRANSFERRED RECORDS (OUTPATIENT)
Dept: HEALTH INFORMATION MANAGEMENT | Facility: CLINIC | Age: 77
End: 2017-11-28

## 2018-01-03 DIAGNOSIS — N39.41 URGENCY INCONTINENCE: ICD-10-CM

## 2018-01-05 RX ORDER — SOLIFENACIN SUCCINATE 5 MG/1
5 TABLET, FILM COATED ORAL DAILY
Qty: 90 TABLET | Refills: 2 | Status: SHIPPED | OUTPATIENT
Start: 2018-01-05 | End: 2019-09-27

## 2018-01-09 ENCOUNTER — OFFICE VISIT (OUTPATIENT)
Dept: INTERNAL MEDICINE | Facility: CLINIC | Age: 78
End: 2018-01-09
Payer: COMMERCIAL

## 2018-01-09 ENCOUNTER — MYC MEDICAL ADVICE (OUTPATIENT)
Dept: INTERNAL MEDICINE | Facility: CLINIC | Age: 78
End: 2018-01-09

## 2018-01-09 VITALS — RESPIRATION RATE: 20 BRPM | DIASTOLIC BLOOD PRESSURE: 83 MMHG | SYSTOLIC BLOOD PRESSURE: 139 MMHG | HEART RATE: 98 BPM

## 2018-01-09 DIAGNOSIS — E11.41 TYPE 2 DIABETES MELLITUS WITH DIABETIC MONONEUROPATHY, WITH LONG-TERM CURRENT USE OF INSULIN (H): ICD-10-CM

## 2018-01-09 DIAGNOSIS — Z79.4 TYPE 2 DIABETES MELLITUS WITH DIABETIC MONONEUROPATHY, WITH LONG-TERM CURRENT USE OF INSULIN (H): ICD-10-CM

## 2018-01-09 DIAGNOSIS — E11.41 TYPE 2 DIABETES MELLITUS WITH DIABETIC MONONEUROPATHY, WITH LONG-TERM CURRENT USE OF INSULIN (H): Primary | ICD-10-CM

## 2018-01-09 DIAGNOSIS — Z79.4 TYPE 2 DIABETES MELLITUS WITH DIABETIC MONONEUROPATHY, WITH LONG-TERM CURRENT USE OF INSULIN (H): Primary | ICD-10-CM

## 2018-01-09 LAB
TSH SERPL DL<=0.005 MIU/L-ACNC: 1.41 MU/L (ref 0.4–4)
VIT B12 SERPL-MCNC: 600 PG/ML (ref 193–986)

## 2018-01-09 ASSESSMENT — PAIN SCALES - GENERAL: PAINLEVEL: NO PAIN (0)

## 2018-01-09 NOTE — PROGRESS NOTES
"Rolando Gonzalez is a 77 year old male who comes in for    CC: R foot numbness  HPI:    Mr. Gonzalez is here for R foot feeling cold and a \"weird feeling\". It feels more \"alive\" when he stomps his foot, but he is concerned that it doesn't feel like the left. The altered sensation extended up tot he knee last night, and the leg felt cold from the inside (not cold to the touch). Symptoms did improve with using a heating pad.  He uses coconut oil for dry cracked heels, which helps. He takes gabapentin 60 mg BID for peripheral neuropathy.  Last spring, he had symptoms of claudication in his left leg, but had a normal arterial US in April 2017. Symptoms were attributed to spinal stenosis, and he worked with PT and walking to help improve is symptoms. He did not like PT.  He started Amoxicillin 1000 mg daily yesterday, prescribed by his asthma specialist ([facility]). He is still wheezing, using his inhalers as prescribed.    Other issues discussed today:     Patient Active Problem List   Diagnosis     Hyperlipidemia     Diabetes mellitus type 2, insulin dependent (H)     Hypertension     Diverticulosis     ED (erectile dysfunction)     Diabetic nephropathy (H)     COPD (chronic obstructive pulmonary disease) (H)     Asthma exacerbation     Gout     Decreased GFR     Claudication (H), left leg, secondary to spinal stenosis       Current Outpatient Prescriptions   Medication Sig Dispense Refill     AMOXICILLIN PO Take 1,000 mg by mouth daily       solifenacin (VESICARE) 5 MG tablet Take 1 tablet (5 mg) by mouth daily 90 tablet 2     insulin glargine (LANTUS SOLOSTAR) 100 UNIT/ML injection Inject 30 Units Subcutaneous 2 times daily 30 mL 1     budesonide-formoterol (SYMBICORT) 80-4.5 MCG/ACT Inhaler Inhale 2 puffs into the lungs 2 times daily 1 Inhaler 1     fluticasone-salmeterol (ADVAIR) 500-50 MCG/DOSE diskus inhaler Inhale 1 puff into the lungs every 12 hours       glipiZIDE (GLUCOTROL) 10 MG tablet Take 1 tablet (10 " mg) by mouth 2 times daily 180 tablet 1     gabapentin (NEURONTIN) 300 MG capsule Take 2 capsules (600 mg) by mouth 2 times daily 360 capsule 3     losartan (COZAAR) 100 MG tablet Take 1 tablet (100 mg) by mouth daily 90 tablet 3     amLODIPine (NORVASC) 10 MG tablet Take 1 tablet (10 mg) by mouth daily 90 tablet 3     omeprazole (PRILOSEC) 40 MG capsule Take 1 capsule (40 mg) by mouth 2 times daily 180 capsule 3     simvastatin (ZOCOR) 20 MG tablet Take 1 tablet (20 mg) by mouth At Bedtime 90 tablet 2     metFORMIN modified (GLUMETZA) 500 MG 24 hr tablet Take 2 tablets every morning, 1 tablet at noon, and 2 tablets at bedtime. 450 tablet 3     chlorthalidone (HYGROTON) 25 MG tablet Take one half tablet every morning.    Discontinue the hydrochlorothiazide 12.5 mg 45 tablet 3     celecoxib (CELEBREX) 200 MG capsule Take 1 capsule (200 mg) by mouth daily 90 capsule 3     allopurinol (ZYLOPRIM) 300 MG tablet Take 1 tablet (300 mg) by mouth daily 90 tablet 3     blood glucose monitoring (ACCU-CHEK MERY) test strip Test 2 times daily 200 each 3     camphor-menthol (DERMASARRA) 0.5-0.5 % LOTN Applied to skin 3 times day p.r.n. for itching 1 Bottle 3     insulin pen needle (B-D U/F) 31G X 8 MM Use twice daily or as directed. 200 each 3     cyclobenzaprine (FLEXERIL) 5 MG tablet Take one or two daily as needed for severe neck neck pain. 42 tablet 0     Dentifrices (BIOTENE DRY MOUTH) PSTE Apply 1 spray to affected area       indomethacin (INDOCIN) 50 MG capsule Take 1 capsule (50 mg) by mouth 3 times daily as needed Use only during a gout attack 30 capsule 0     HYDROcodone-acetaminophen (NORCO) 5-325 MG per tablet Take 1 tablet by mouth nightly as needed for moderate to severe pain 10 tablet 0     albuterol (PROAIR HFA, PROVENTIL HFA, VENTOLIN HFA) 108 (90 BASE) MCG/ACT inhaler Inhale 2 puffs into the lungs every 6 hours       Cholecalciferol (VITAMIN D-3) 1000 UNITS CAPS Take 1,000 Units by mouth daily       saw  palmetto 160 MG CAPS Take 160 mg by mouth daily       aspirin 81 MG tablet Take by mouth daily       tiotropium (SPIRIVA HANDIHALER) 18 MCG inhalation capsule Inhale contents of one capsule daily. 30 capsule 1     fluticasone (FLOVENT HFA) 110 MCG/ACT inhaler Inhale 2 puffs into the lungs 2 times daily 1 Inhaler 12     fluticasone (FLONASE) 50 MCG/ACT nasal spray 2 sprays by Both Nostrils route daily.           ALLERGIES: Morphine hcl and Seasonal allergies    PAST MEDICAL HX:   Past Medical History:   Diagnosis Date     Degenerative tear of meniscus      Diabetes mellitus type 2, insulin dependent (H)      Diabetic neuropathy (H)     improved with gabapentin     Diverticulosis      ED (erectile dysfunction)      Edema extremities 2/15/16    venous insufficiency     Gout      Hyperlipidemia      Hypertension      Moderate persistent asthma 2017    M MD Preeti allergist.  FEV1 1.83. FEF 25-75 1.5 (3/13/17)     Osteoarthritis, shoulder     right     RLL lung nodule 03/20/2017    stable 8 mm rll nodule.  No additional CT testing needed.       Urge incontinence        PAST SURGICAL HX:   Past Surgical History:   Procedure Laterality Date     CATARACT IOL, RT/LT      both eyes     L4-5 fusion         IMMUNIZATION HX:   Immunization History   Administered Date(s) Administered     HEPA 04/26/2006, 12/19/2007     Influenza (H1N1) 02/01/2010     Influenza (High Dose) 3 valent vaccine 10/01/2013, 09/25/2015, 09/01/2017     Influenza (IIV3) PF 11/24/2005, 12/20/2006, 11/01/2007, 10/30/2009, 11/01/2010, 02/01/2012, 12/08/2014     Pneumo Conj 13-V (2010&after) 12/10/2014     Pneumococcal 23 valent 11/24/2005     TD (ADULT, 7+) 03/27/2006     TDAP Vaccine (Boostrix) 06/12/2013     Zoster vaccine, live 12/20/2006       SOCIAL HX:   Social History     Social History Narrative       ROS:   CONSTITUTIONAL: no fatigue, no unexpected change in weight  SKIN: no worrisome rashes, no worrisome moles, no worrisome lesions  EYES: no acute  vision problems or changes  ENT:+nasal congestion, mild sore throat  RESP:+wheezing  CV: no chest pain, no palpitations, no new or worsening peripheral edema  NEURO: no weakness, no dizziness, no syncope, no headaches    OBJECTIVE:  /83  Pulse 98  Resp 20   Wt Readings from Last 1 Encounters:   11/07/17 102.2 kg (225 lb 4.8 oz)     Constitutional: no distress, comfortable, pleasant, well-groomed  Eyes: anicteric, conjunctiva pink, normal extra-ocular movements   Ears, Nose and Throat: tympanic membranes pearly gray with positive light reflex, EACs clear bilaterally, nose clear and free of lesions, throat clear, mucosa pink and moist   Neck: supple with full range of motion, no thyromegaly.   Cardiovascular: regular rate and rhythm, normal S1 and S2, no murmurs, rubs or gallops, DP/PT pulses full and symmetric, cap refill <2 sec, feet are warm  Respiratory: good air movement bilaterally, mild expiratory wheezes, no crackles, non-labored  Foot Exam:  normal DP and PT pulses, no trophic changes or ulcerative lesions, diminished sensation with monofilament in bilateral heels but intact in toes  MSK: no tenderness over spine/paraspinal muscles, or SI joint, gait is steady, no edema, no calf tenderness  Skin: no concerning lesions or rash, no jaundice, temp normal   Neurological: cranial nerves grossly intact, patellar DTRs 2+, gait is steady with intact balance, speech is clear, no tremor   Psychological: appropriate mood, demonstrates intact judgment and logical thought process      ASSESSMENT/PLAN:    1. Type 2 diabetes mellitus with diabetic mononeuropathy, with long-term current use of insulin (H)  Will obtain EMG given worsening neuropathy in RLE. Reviewed with pt there are no signs of blood clot on exam, and there is good perfusion to his extremities. Recommended continuing to work on good diabetes control and staying active--consider re-starting exercises done in PT last spring. May use heating pad for  comfort as needed.   - EMG; Future  - TSH with free T4 reflex; Future  - Vitamin B12; Future    FOLLOW UP: If not improving or if worsening, otherwise after EMG  LORI Madrid CNP

## 2018-01-09 NOTE — MR AVS SNAPSHOT
After Visit Summary   1/9/2018    Rolando Gonzalez    MRN: [id number]           Patient Information     Date Of Birth          1940        Visit Information        Provider Department      1/9/2018 4:00 PM Jocelyn Stiles APRN [facility]        Today's Diagnoses     Type 2 diabetes mellitus with diabetic mononeuropathy, with long-term current use of insulin (H)    -  1      Care Instructions    Primary Care Center Phone Number [phone]  Primary Care Center Medication Refill Request Information:  * Please contact your pharmacy regarding ANY request for medication refills.  ** [facility] Prescription Fax = [phone]  * Please allow 3 business days for routine medication refills.  * Please allow 5 business days for controlled substance medication refills.     Primary Care Center Test Result notification information:  *You will be notified with in 7-10 days of your appointment day regarding the results of your test.  If you are on MyChart you will be notified as soon as the provider has reviewed the results and signed off on them.              Follow-ups after your visit        Your next 10 appointments already scheduled     Jan 09, 2018  5:15 PM CST   LAB with  LAB   [facility] ([facility])    [address] [place] [address] [phone]           Please do not eat 10-12 hours before your appointment if you are coming in fasting for labs on lipids, cholesterol, or glucose (sugar). This does not apply to pregnant women. Water, hot tea and black coffee (with nothing added) are okay. Do not drink other fluids, diet soda or chew gum.            Jan 26, 2018 11:30 AM CST   (Arrive by 11:15 AM)   EMG with Mariah Berman MD   [facility] Physical Medicine and Rehabilitation ([facility])    [address] [place] [address] [phone]              Future tests that were  ordered for you today     Open Future Orders        Priority Expected Expires Ordered    TSH with free T4 reflex Routine 1/9/2018 1/23/2018 1/9/2018    Vitamin B12 Routine 1/9/2018 1/9/2019 1/9/2018    EMG Routine  6/1/2018 1/9/2018            Who to contact     Please call your clinic at [phone] to:    Ask questions about your health    Make or cancel appointments    Discuss your medicines    Learn about your test results    Speak to your doctor   If you have compliments or concerns about an experience at your clinic, or if you wish to file a complaint, please contact [facility] Patient Relations at [phone] or email us at [email or web address]         Additional Information About Your Visit        Bl[employer] Information     [employer] gives you secure access to your electronic health record. If you see a primary care provider, you can also send messages to your care team and make appointments. If you have questions, please call your primary care clinic.  If you do not have a primary care provider, please call [phone] and they will assist you.      [employer] is an electronic gateway that provides easy, online access to your medical records. With [employer], you can request a clinic appointment, read your test results, renew a prescription or communicate with your care team.     To access your existing account, please contact your [facility] or call [phone] for assistance.        Care EveryWhere ID     This is your Care EveryWhere ID. This could be used by other organizations to access your [facility] medical records  [id number]        Your Vitals Were     Pulse Respirations                98 20           Blood Pressure from Last 3 Encounters:   01/09/18 139/83   11/07/17 131/77   04/24/17 143/78    Weight from Last 3 Encounters:   11/07/17 102.2 kg (225 lb 4.8 oz)   04/24/17 98.9 kg (218 lb)   03/27/17 98.4 kg (217 lb)               Primary  Care Provider Office Phone # Fax #    Denisegilbert Russ [phone] [address] [address]        Equal Access to Services     FERMINIBIS RAINA : Mojgan villa herrera marissa Jon, wamolinada luqadaha, qaybta kaalmada young, yan hernandezcalli ashlyn. So [facility] [phone].    ATENCIÓN: Si habla español, tiene a redman disposición servicios gratuitos de asistencia lingüística. Llame al [phone].    We comply with applicable federal civil rights laws and [place] laws. We do not discriminate on the basis of race, color, national origin, age, disability, sex, sexual orientation, or gender identity.            Thank you!     Thank you for choosing [facility]  for your care. Our goal is always to provide you with excellent care. Hearing back from our patients is one way we can continue to improve our services. Please take a few minutes to complete the written survey that you may receive in the mail after your visit with us. Thank you!             Your Updated Medication List - Protect others around you: Learn how to safely use, store and throw away your medicines at www.disposemymeds.org.          This list is accurate as of: 1/9/18  4:51 PM.  Always use your most recent med list.                   Brand Name Dispense Instructions for use Diagnosis    albuterol 108 (90 BASE) MCG/ACT Inhaler    PROAIR HFA/PROVENTIL HFA/VENTOLIN HFA     Inhale 2 puffs into the lungs every 6 hours        allopurinol 300 MG tablet    ZYLOPRIM    90 tablet    Take 1 tablet (300 mg) by mouth daily    Gout, unspecified       amLODIPine 10 MG tablet    NORVASC    90 tablet    Take 1 tablet (10 mg) by mouth daily    Essential hypertension with goal blood pressure less than 130/80       AMOXICILLIN PO      Take 1,000 mg by mouth daily        aspirin 81 MG tablet      Take by mouth daily    Hearing loss, unspecified laterality, Fatigue, Diabetes mellitus type 2, insulin  dependent (H), Hyperlipidemia, COPD (chronic obstructive pulmonary disease) (H)       BIOTENE DRY MOUTH Pste      Apply 1 spray to affected area        blood glucose monitoring test strip    ACCU-CHEK MERY    200 each    Test 2 times daily    Diabetes mellitus (H)       budesonide-formoterol 80-4.5 MCG/ACT Inhaler    SYMBICORT    1 Inhaler    Inhale 2 puffs into the lungs 2 times daily    Moderate persistent asthma without complication       camphor-menthol 0.5-0.5 % Lotn    DERMASARRA    1 Bottle    Applied to skin 3 times day p.r.n. for itching    Itching       celecoxib 200 MG capsule    celeBREX    90 capsule    Take 1 capsule (200 mg) by mouth daily    Diabetes mellitus type 2, uncontrolled (H)       chlorthalidone 25 MG tablet    HYGROTON    45 tablet    Take one half tablet every morning.  Discontinue the hydrochlorothiazide 12.5 mg    Edema, unspecified type       cyclobenzaprine 5 MG tablet    FLEXERIL    42 tablet    Take one or two daily as needed for severe neck neck pain.    Neck pain       fluticasone 110 MCG/ACT Inhaler    FLOVENT HFA    1 Inhaler    Inhale 2 puffs into the lungs 2 times daily    Asthma       fluticasone 50 MCG/ACT spray    FLONASE     2 sprays by Both Nostrils route daily.    Diabetes mellitus type 2, uncontrolled (H), Hyperlipidemia       fluticasone-salmeterol 500-50 MCG/DOSE diskus inhaler    ADVAIR     Inhale 1 puff into the lungs every 12 hours        gabapentin 300 MG capsule    NEURONTIN    360 capsule    Take 2 capsules (600 mg) by mouth 2 times daily    Diabetic nephropathy (H)       glipiZIDE 10 MG tablet    GLUCOTROL    180 tablet    Take 1 tablet (10 mg) by mouth 2 times daily    Diabetes mellitus type 2, uncontrolled (H)       HYDROcodone-acetaminophen 5-325 MG per tablet    NORCO    10 tablet    Take 1 tablet by mouth nightly as needed for moderate to severe pain    Rib pain       indomethacin 50 MG capsule    INDOCIN    30 capsule    Take 1 capsule (50 mg) by mouth 3  times daily as needed Use only during a gout attack    Gout, unspecified       insulin glargine 100 UNIT/ML injection    LANTUS SOLOSTAR    30 mL    Inject 30 Units Subcutaneous 2 times daily    Diabetes mellitus type 2, uncontrolled (H)       insulin pen needle 31G X 8 MM    B-D U/F    200 each    Use twice daily or as directed.    Diabetes mellitus type 2, uncontrolled (H)       losartan 100 MG tablet    COZAAR    90 tablet    Take 1 tablet (100 mg) by mouth daily    Essential hypertension with goal blood pressure less than 130/80       metFORMIN modified 500 MG 24 hr tablet    GLUMETZA    450 tablet    Take 2 tablets every morning, 1 tablet at noon, and 2 tablets at bedtime.    Diabetes mellitus type 2, insulin dependent (H)       omeprazole 40 MG capsule    priLOSEC    180 capsule    Take 1 capsule (40 mg) by mouth 2 times daily    Esophageal reflux       saw palmetto 160 MG Caps      Take 160 mg by mouth daily        simvastatin 20 MG tablet    ZOCOR    90 tablet    Take 1 tablet (20 mg) by mouth At Bedtime    Hyperlipemia       solifenacin 5 MG tablet    VESICARE    90 tablet    Take 1 tablet (5 mg) by mouth daily    Urgency incontinence       tiotropium 18 MCG capsule    SPIRIVA HANDIHALER    30 capsule    Inhale contents of one capsule daily.    Intermittent asthma       Vitamin D-3 1000 UNITS Caps      Take 1,000 Units by mouth daily

## 2018-01-09 NOTE — PATIENT INSTRUCTIONS
Primary Care Center Phone Number [phone]  Primary Care Center Medication Refill Request Information:  * Please contact your pharmacy regarding ANY request for medication refills.  ** [facility] Prescription Fax = [phone]  * Please allow 3 business days for routine medication refills.  * Please allow 5 business days for controlled substance medication refills.     Primary Care Center Test Result notification information:  *You will be notified with in 7-10 days of your appointment day regarding the results of your test.  If you are on MyChart you will be notified as soon as the provider has reviewed the results and signed off on them.

## 2018-01-09 NOTE — PROGRESS NOTES
Rooming Note  Health Maintenance   Health Maintenance Due   Topic Date Due     ASTHMA ACTION PLAN Q1 YR  12/15/1945     ASTHMA CONTROL TEST Q6 MOS  [birth year]/15/1945     ADVANCE DIRECTIVE PLANNING Q5 YRS  12/15/1995     FALL RISK ASSESSMENT  12/15/2005    All health maintenance items UP TO DATE

## 2018-01-09 NOTE — NURSING NOTE
Chief Complaint   Patient presents with     Numbness     pt is here to discuss right foot numbness        Porsha Prather CMA at 3:49 PM on 1/9/2018

## 2018-01-09 NOTE — TELEPHONE ENCOUNTER
----- Message from Veda Jennifer sent at 1/9/2018  7:46 AM CST -----  Regarding: PT HAS NUMBNESS IN RIGHT LEG - DR REYNA  Contact: [phone]  Pt has numbness in right leg and hoping for a sooner appt with Dr. Reyna. First available that I was able to schedule was 1/22.    Pt can be reached at [phone].    Appt with Jocelyn Stiles 01/19/2018 at 4pm.  Charisma Carlson RN 8:56 AM on 1/9/2018.

## 2018-01-25 RX ORDER — GLIPIZIDE 10 MG/1
10 TABLET ORAL 2 TIMES DAILY
Qty: 180 TABLET | Refills: 1 | Status: SHIPPED | OUTPATIENT
Start: 2018-01-25 | End: 2018-09-21

## 2018-01-25 NOTE — TELEPHONE ENCOUNTER
glucotrol   Last Written Prescription Date:  8/5/17  Last Fill Quantity: 180,   # refills: 1  Last Office Visit : 1/9/18  Future Office visit:  No  Creatinine   Date Value Ref Range Status   11/07/2017 1.79 (H) 0.66 - 1.25 mg/dL Final   ]    Routing refill request to clinic nursefor review/approval because:  Failed protocol due to elevated creat.

## 2018-01-26 ENCOUNTER — OFFICE VISIT (OUTPATIENT)
Dept: PHYSICAL MEDICINE AND REHAB | Facility: CLINIC | Age: 78
End: 2018-01-26
Payer: COMMERCIAL

## 2018-01-26 DIAGNOSIS — E11.41 TYPE 2 DIABETES MELLITUS WITH DIABETIC MONONEUROPATHY, WITH LONG-TERM CURRENT USE OF INSULIN (H): ICD-10-CM

## 2018-01-26 DIAGNOSIS — Z79.4 TYPE 2 DIABETES MELLITUS WITH DIABETIC MONONEUROPATHY, WITH LONG-TERM CURRENT USE OF INSULIN (H): ICD-10-CM

## 2018-01-26 NOTE — PROGRESS NOTES
[facility]  Physical Medicine and Rehabilitation Clinic          Nerve Conduction & EMG Report          Patient:            Rolando Gonzalez   YOB: 1940  Patient ID:                  [id number]   Age:                         77 Years 1 Months  Gender:             Male  Req Clinician:           LORI Du CNP          History & Examination: Patient is a 76 yo male who presents with intermittent numbness at right foot. No back pain, radicular pain in bilateral lower extremities, weakness or loss of bowel/bladder control. Exam showed strength of 4+/5 at HF b/l, and 5/5 distally in bilateral lower extremities. Sensation was intact to light touch. Reflexes were 2+ at patella and 1+ at ankles b/l. Query polyneuropathy.      Techniques: Motor and sensory conduction studies were done with surface recording electrodes. Temperature was monitored and recorded throughout the study. Lower extremities were maintained at a temperature of 31degrees Centigrade or higher. Upper extremities were maintained at a temperature of 32degrees Centigrade or higher.  EMG was done with a concentric needle electrode.        Results:  Bilateral sural and left radial antidromic sensory NCSs were normal.     Left ulnar antidromic sensory NCSs showed reduced SNAP amplitude and attenuated conduction velocity.    Left median antidromic sensory NCSs showed normal SNAP amplitude and attenuated conduction velocity.    Left median, left ulnar, bilateral tibial and bilateral peroneal motor NCSs were normal.    Left tibial, median and ulnar F-wave minimal latencies were normal.     EMG was deferred because the answer to the referral question could be provided by nerve conduction studies alone.      Interpretation:    -- There is no electrodiagnostic evidence of polyneuropathy.   -- There is no electrodiagnostic evidence of sural, tibial or deep peroneal neuropathy in the bilateral lower  extremities.    -- Possible mild left median neuropathy at the wrist as seen in carpal tunnel syndrome. Possible left ulnar neuropathy, not localized. If these findings are considered clinically relevant in the future, further nerve conduction and needle studies should be performed to confirm the diagnosis.          EMG Physician:  Mariah Berman MD  Physical Medicine & Rehabilitation        Sensory NCS      Nerve / Sites Rec. Site Onset Peak NP Amp Ref. PP Amp Dist Cole Ref. Temp     ms ms  V  V  V cm m/s m/s  C   L MEDIAN - Dig II Anti      Wrist Dig II 2.97 3.75 16.9 10.0 26.7 14 47.2 48.0 32.6   L ULNAR - Dig V Anti      Wrist Dig V 2.71 3.28 6.9 8.0 5.6 12.5 46.2 48.0 32.5   L RADIAL - Snuff      Forearm Snuff 1.93 2.40 27.0 15.0 23.7 12.5 64.9 48.0 33.1   L SURAL - Lat Mall 60      Calf Ankle 2.71 3.80 5.0 5.0 6.6 14 51.7 38.0 33.1   R SURAL - Lat Mall 60      Calf Ankle 3.02 3.85 5.1 5.0 8.5 14 46.3 38.0 32.2       Motor NCS      Nerve / Sites Rec. Site Lat Ref. Amp Ref. Rel Amp Dist Cole Ref. Dur. Area Temp.     ms ms mV mV % cm m/s m/s ms %  C   L MEDIAN - APB      Wrist APB 3.96 4.40 7.1 5.0 100 8   5.36 100 33      Elbow APB 8.65  6.7  94.3 26 55.5 48.0 5.68 92.6 33   L ULNAR - ADM      Wrist ADM 3.23 3.50 7.8 5.0 100 8   6.09 100 33.3      B.Elbow ADM 6.09  7.7  99.2 16 55.9 48.0 7.50 105 33.3      A.Elbow ADM 7.97  7.5  97.3 10 53.3 48.0 7.29 103 33.1   L DEEP PERONEAL - EDB      Ankle EDB 3.80 6.00 3.5 2.5 100 8   4.90 100 31.7      FibHead EDB 10.00  3.0  86.1 27 43.6 38.0 5.62 95.8 32.1      Pop Fos EDB 11.51  2.8  79.4 8 53.0 38.0 6.04 94 32.4   R DEEP PERONEAL - EDB      Ankle EDB 4.27 6.00 4.6 2.5 100 8   5.68 100 33      FibHead EDB 10.52  4.3  94.9 26 41.6 38.0 6.09 104 33      Pop Fos EDB 12.71  4.2  92 10 45.7 38.0 6.20 101 33   L TIBIAL - AH      Ankle AH 3.49 6.00 9.3 4.0 100 8   5.89 100 32.9      Pop Fos AH 11.82  5.6  59.9 38 45.6 38.0 6.72 63.3 33   R TIBIAL - AH      Ankle AH 3.80 6.00  9.2 4.0 100 8   5.89 100 33      Pop Fos AH 11.77  6.1  65.9 39 48.9 38.0 8.23 95.3 33       F  Wave      Nerve Min F Lat Max F Lat Mean FLat Temp.    ms ms ms  C   R TIBIAL 51.72 55.78 54.00 33   L MEDIAN 29.06 30.89 29.67 32.6   L ULNAR 29.17 30.78 30.29 33.4

## 2018-01-26 NOTE — LETTER
1/26/2018       RE: Rolando Gonzalez  [address]  [place] [address]     Dear Colleague,    Thank you for referring your patient, Rolando Gonzalez, to the [facility]ICINE [facility] RE[facility]ATION at [facility]. Please see a copy of my visit note below.        [facility]  Physical Medicine and Rehabilitation Clinic          Nerve Conduction & EMG Report          Patient:            Rolando Gonzalez   YOB: 1940  Patient ID:                  [id number]   Age:                         77 Years 1 Months  Gender:             Male  Req Clinician:           LORI Du CNP          History & Examination: Patient is a 76 yo male who presents with intermittent numbness at right foot. No back pain, radicular pain in bilateral lower extremities, weakness or loss of bowel/bladder control. Exam showed strength of 4+/5 at HF b/l, and 5/5 distally in bilateral lower extremities. Sensation was intact to light touch. Reflexes were 2+ at patella and 1+ at ankles b/l. Query polyneuropathy.      Techniques: Motor and sensory conduction studies were done with surface recording electrodes. Temperature was monitored and recorded throughout the study. Lower extremities were maintained at a temperature of 31degrees Centigrade or higher. Upper extremities were maintained at a temperature of 32degrees Centigrade or higher.  EMG was done with a concentric needle electrode.        Results:  Bilateral sural and left radial antidromic sensory NCSs were normal.     Left ulnar antidromic sensory NCSs showed reduced SNAP amplitude and attenuated conduction velocity.    Left median antidromic sensory NCSs showed normal SNAP amplitude and attenuated conduction velocity.    Left median, left ulnar, bilateral tibial and bilateral peroneal motor NCSs were normal.    Left tibial, median and ulnar F-wave minimal latencies were normal.     EMG was deferred  because the answer to the referral question could be provided by nerve conduction studies alone.      Interpretation:    -- There is no electrodiagnostic evidence of polyneuropathy.   -- There is no electrodiagnostic evidence of sural, tibial or deep peroneal neuropathy in the bilateral lower extremities.    -- Possible mild left median neuropathy at the wrist as seen in carpal tunnel syndrome. Possible left ulnar neuropathy, not localized. If these findings are considered clinically relevant in the future, further nerve conduction and needle studies should be performed to confirm the diagnosis.          EMG Physician:  Mariah Berman MD  Physical Medicine & Rehabilitation        Sensory NCS      Nerve / Sites Rec. Site Onset Peak NP Amp Ref. PP Amp Dist Cole Ref. Temp     ms ms  V  V  V cm m/s m/s  C   L MEDIAN - Dig II Anti      Wrist Dig II 2.97 3.75 16.9 10.0 26.7 14 47.2 48.0 32.6   L ULNAR - Dig V Anti      Wrist Dig V 2.71 3.28 6.9 8.0 5.6 12.5 46.2 48.0 32.5   L RADIAL - Snuff      Forearm Snuff 1.93 2.40 27.0 15.0 23.7 12.5 64.9 48.0 33.1   L SURAL - Lat Mall 60      Calf Ankle 2.71 3.80 5.0 5.0 6.6 14 51.7 38.0 33.1   R SURAL - Lat Mall 60      Calf Ankle 3.02 3.85 5.1 5.0 8.5 14 46.3 38.0 32.2       Motor NCS      Nerve / Sites Rec. Site Lat Ref. Amp Ref. Rel Amp Dist Cole Ref. Dur. Area Temp.     ms ms mV mV % cm m/s m/s ms %  C   L MEDIAN - APB      Wrist APB 3.96 4.40 7.1 5.0 100 8   5.36 100 33      Elbow APB 8.65  6.7  94.3 26 55.5 48.0 5.68 92.6 33   L ULNAR - ADM      Wrist ADM 3.23 3.50 7.8 5.0 100 8   6.09 100 33.3      B.Elbow ADM 6.09  7.7  99.2 16 55.9 48.0 7.50 105 33.3      A.Elbow ADM 7.97  7.5  97.3 10 53.3 48.0 7.29 103 33.1   L DEEP PERONEAL - EDB      Ankle EDB 3.80 6.00 3.5 2.5 100 8   4.90 100 31.7      FibHead EDB 10.00  3.0  86.1 27 43.6 38.0 5.62 95.8 32.1      Pop Fos EDB 11.51  2.8  79.4 8 53.0 38.0 6.04 94 32.4   R DEEP PERONEAL - EDB      Ankle EDB 4.27 6.00 4.6 2.5 100 8   5.68  100 33      FibHead EDB 10.52  4.3  94.9 26 41.6 38.0 6.09 104 33      Pop Fos EDB 12.71  4.2  92 10 45.7 38.0 6.20 101 33   L TIBIAL - AH      Ankle AH 3.49 6.00 9.3 4.0 100 8   5.89 100 32.9      Pop Fos AH 11.82  5.6  59.9 38 45.6 38.0 6.72 63.3 33   R TIBIAL - AH      Ankle AH 3.80 6.00 9.2 4.0 100 8   5.89 100 33      Pop Fos AH 11.77  6.1  65.9 39 48.9 38.0 8.23 95.3 33       F  Wave      Nerve Min F Lat Max F Lat Mean FLat Temp.    ms ms ms  C   R TIBIAL 51.72 55.78 54.00 33   L MEDIAN 29.06 30.89 29.67 32.6   L ULNAR 29.17 30.78 30.29 33.4                                        Again, thank you for allowing me to participate in the care of your patient.      Sincerely,    Mariah Berman MD

## 2018-01-26 NOTE — MR AVS SNAPSHOT
After Visit Summary   1/26/2018    Rolando Gonzalez    MRN: [id number]           Patient Information     Date Of Birth          1940        Visit Information        Provider Department      1/26/2018 11:30 AM Mariah Berman MD [facility]        Today's Diagnoses     Type 2 diabetes mellitus with diabetic mononeuropathy, with long-term current use of insulin (H)           Follow-ups after your visit        Follow-up notes from your care team     Return if symptoms worsen or fail to improve.      Who to contact     Please call your clinic at [phone] to:    Ask questions about your health    Make or cancel appointments    Discuss your medicines    Learn about your test results    Speak to your doctor   If you have compliments or concerns about an experience at your clinic, or if you wish to file a complaint, please contact [facility] Patient Relations at [phone] or email us at [email or web address]         Additional Information About Your Visit        [employer] Information     [employer] gives you secure access to your electronic health record. If you see a primary care provider, you can also send messages to your care team and make appointments. If you have questions, please call your primary care clinic.  If you do not have a primary care provider, please call [phone] and they will assist you.      [employer] is an electronic gateway that provides easy, online access to your medical records. With [employer], you can request a clinic appointment, read your test results, renew a prescription or communicate with your care team.     To access your existing account, please contact your [facility] or call [phone] for assistance.        Care EveryWhere ID     This is your Care EveryWhere ID. This could be used by other organizations to access your [facility] medical records  [id number]          Blood Pressure from Last 3 Encounters:   01/09/18 139/83   11/07/17 131/77   04/24/17 143/78    Weight from Last 3 Encounters:   11/07/17 102.2 kg (225 lb 4.8 oz)   04/24/17 98.9 kg (218 lb)   03/27/17 98.4 kg (217 lb)              We Performed the Following     EMG     HC NCS MOTOR W OR W/O F-WAVE, 13 OR MORE        Primary Care Provider Office Phone # Fax #    Denisegilbert Russ [phone] [address] [address]        Equal Access to Services     [facility]: Hadii villa Jon, waaxda alyssa, qaybta kaalmada young, yan west . So [facility] [phone].    ATENCIÓN: Si habla español, tiene a redman disposición servicios gratuitos de asistencia lingüística. [facility] [phone].    We comply with applicable federal civil rights laws and [place] laws. We do not discriminate on the basis of race, color, national origin, age, disability, sex, sexual orientation, or gender identity.            Thank you!     Thank you for choosing [facility]ATION  for your care. Our goal is always to provide you with excellent care. Hearing back from our patients is one way we can continue to improve our services. Please take a few minutes to complete the written survey that you may receive in the mail after your visit with us. Thank you!             Your Updated Medication List - Protect others around you: Learn how to safely use, store and throw away your medicines at www.disposemymeds.org.          This list is accurate as of 1/26/18 11:59 PM.  Always use your most recent med list.                   Brand Name Dispense Instructions for use Diagnosis    albuterol 108 (90 BASE) MCG/ACT Inhaler    PROAIR HFA/PROVENTIL HFA/VENTOLIN HFA     Inhale 2 puffs into the lungs every 6 hours        allopurinol 300 MG tablet    ZYLOPRIM    90 tablet    Take 1 tablet (300 mg) by mouth daily    Gout, unspecified       amLODIPine  10 MG tablet    NORVASC    90 tablet    Take 1 tablet (10 mg) by mouth daily    Essential hypertension with goal blood pressure less than 130/80       AMOXICILLIN PO      Take 1,000 mg by mouth daily        aspirin 81 MG tablet      Take by mouth daily    Hearing loss, unspecified laterality, Fatigue, Diabetes mellitus type 2, insulin dependent (H), Hyperlipidemia, COPD (chronic obstructive pulmonary disease) (H)       BIOTENE DRY MOUTH Pste      Apply 1 spray to affected area        blood glucose monitoring test strip    ACCU-CHEK MERY    200 each    Test 2 times daily    Diabetes mellitus (H)       budesonide-formoterol 80-4.5 MCG/ACT Inhaler    SYMBICORT    1 Inhaler    Inhale 2 puffs into the lungs 2 times daily    Moderate persistent asthma without complication       camphor-menthol 0.5-0.5 % Lotn    DERMASARRA    1 Bottle    Applied to skin 3 times day p.r.n. for itching    Itching       celecoxib 200 MG capsule    celeBREX    90 capsule    Take 1 capsule (200 mg) by mouth daily    Diabetes mellitus type 2, uncontrolled (H)       chlorthalidone 25 MG tablet    HYGROTON    45 tablet    Take one half tablet every morning.  Discontinue the hydrochlorothiazide 12.5 mg    Edema, unspecified type       cyclobenzaprine 5 MG tablet    FLEXERIL    42 tablet    Take one or two daily as needed for severe neck neck pain.    Neck pain       fluticasone 110 MCG/ACT Inhaler    FLOVENT HFA    1 Inhaler    Inhale 2 puffs into the lungs 2 times daily    Asthma       fluticasone 50 MCG/ACT spray    FLONASE     2 sprays by Both Nostrils route daily.    Diabetes mellitus type 2, uncontrolled (H), Hyperlipidemia       fluticasone-salmeterol 500-50 MCG/DOSE diskus inhaler    ADVAIR     Inhale 1 puff into the lungs every 12 hours        gabapentin 300 MG capsule    NEURONTIN    360 capsule    Take 2 capsules (600 mg) by mouth 2 times daily    Diabetic nephropathy (H)       glipiZIDE 10 MG tablet    GLUCOTROL    180 tablet    Take 1  tablet (10 mg) by mouth 2 times daily    Diabetes mellitus type 2, uncontrolled (H)       HYDROcodone-acetaminophen 5-325 MG per tablet    NORCO    10 tablet    Take 1 tablet by mouth nightly as needed for moderate to severe pain    Rib pain       indomethacin 50 MG capsule    INDOCIN    30 capsule    Take 1 capsule (50 mg) by mouth 3 times daily as needed Use only during a gout attack    Gout, unspecified       insulin glargine 100 UNIT/ML injection    LANTUS SOLOSTAR    30 mL    Inject 30 Units Subcutaneous 2 times daily    Diabetes mellitus type 2, uncontrolled (H)       insulin pen needle 31G X 8 MM    B-D U/F    200 each    Use twice daily or as directed.    Diabetes mellitus type 2, uncontrolled (H)       losartan 100 MG tablet    COZAAR    90 tablet    Take 1 tablet (100 mg) by mouth daily    Essential hypertension with goal blood pressure less than 130/80       metFORMIN modified 500 MG 24 hr tablet    GLUMETZA    450 tablet    Take 2 tablets every morning, 1 tablet at noon, and 2 tablets at bedtime.    Diabetes mellitus type 2, insulin dependent (H)       omeprazole 40 MG capsule    priLOSEC    180 capsule    Take 1 capsule (40 mg) by mouth 2 times daily    Esophageal reflux       saw palmetto 160 MG Caps      Take 160 mg by mouth daily        simvastatin 20 MG tablet    ZOCOR    90 tablet    Take 1 tablet (20 mg) by mouth At Bedtime    Hyperlipemia       solifenacin 5 MG tablet    VESICARE    90 tablet    Take 1 tablet (5 mg) by mouth daily    Urgency incontinence       tiotropium 18 MCG capsule    SPIRIVA HANDIHALER    30 capsule    Inhale contents of one capsule daily.    Intermittent asthma       Vitamin D-3 1000 UNITS Caps      Take 1,000 Units by mouth daily

## 2018-02-15 NOTE — TELEPHONE ENCOUNTER
Tom     Last Written Prescription Date:  12/18/17  Last Fill Quantity: 30 ml  # refills: 1  Last Office Visit : 1/9/18  Future Office visit:  No future appt

## 2018-02-27 ENCOUNTER — TRANSFERRED RECORDS (OUTPATIENT)
Dept: HEALTH INFORMATION MANAGEMENT | Facility: CLINIC | Age: 78
End: 2018-02-27

## 2018-03-22 ENCOUNTER — HOSPITAL ENCOUNTER (EMERGENCY)
Facility: CLINIC | Age: 78
Discharge: HOME | End: 2018-03-22
Attending: INTERNAL MEDICINE | Admitting: INTERNAL MEDICINE
Payer: MEDICARE

## 2018-03-22 ENCOUNTER — APPOINTMENT (OUTPATIENT)
Dept: CT IMAGING | Facility: CLINIC | Age: 78
End: 2018-03-22
Attending: INTERNAL MEDICINE
Payer: MEDICARE

## 2018-03-22 VITALS
SYSTOLIC BLOOD PRESSURE: 124 MMHG | HEART RATE: 88 BPM | RESPIRATION RATE: 18 BRPM | TEMPERATURE: 97.5 F | WEIGHT: 210 LBS | BODY MASS INDEX: 31.83 KG/M2 | HEIGHT: 68 IN | OXYGEN SATURATION: 96 % | DIASTOLIC BLOOD PRESSURE: 68 MMHG

## 2018-03-22 DIAGNOSIS — R10.9 LEFT FLANK PAIN: ICD-10-CM

## 2018-03-22 LAB
ABO + RH BLD: NORMAL
ABO + RH BLD: NORMAL
ALBUMIN SERPL-MCNC: 3.2 G/DL (ref 3.4–5)
ALBUMIN UR-MCNC: NEGATIVE MG/DL
ALP SERPL-CCNC: 60 U/L (ref 40–150)
ALT SERPL W P-5'-P-CCNC: 25 U/L (ref 0–70)
ANION GAP SERPL CALCULATED.3IONS-SCNC: 12 MMOL/L (ref 3–14)
APPEARANCE UR: CLEAR
AST SERPL W P-5'-P-CCNC: 10 U/L (ref 0–45)
BASOPHILS # BLD AUTO: 0.1 10E9/L (ref 0–0.2)
BASOPHILS NFR BLD AUTO: 0.5 %
BILIRUB SERPL-MCNC: 0.5 MG/DL (ref 0.2–1.3)
BILIRUB UR QL STRIP: NEGATIVE
BLD GP AB SCN SERPL QL: NORMAL
BLOOD BANK CMNT PATIENT-IMP: NORMAL
BUN SERPL-MCNC: 30 MG/DL (ref 7–30)
CALCIUM SERPL-MCNC: 8.7 MG/DL (ref 8.5–10.1)
CHLORIDE SERPL-SCNC: 111 MMOL/L (ref 94–109)
CO2 SERPL-SCNC: 20 MMOL/L (ref 20–32)
COLOR UR AUTO: YELLOW
CREAT SERPL-MCNC: 1.66 MG/DL (ref 0.66–1.25)
DIFFERENTIAL METHOD BLD: ABNORMAL
EOSINOPHIL # BLD AUTO: 0.5 10E9/L (ref 0–0.7)
EOSINOPHIL NFR BLD AUTO: 4.1 %
ERYTHROCYTE [DISTWIDTH] IN BLOOD BY AUTOMATED COUNT: 13.9 % (ref 10–15)
GFR SERPL CREATININE-BSD FRML MDRD: 40 ML/MIN/1.7M2
GLUCOSE SERPL-MCNC: 115 MG/DL (ref 70–99)
GLUCOSE UR STRIP-MCNC: NEGATIVE MG/DL
HCT VFR BLD AUTO: 37.5 % (ref 40–53)
HGB BLD-MCNC: 13 G/DL (ref 13.3–17.7)
HGB UR QL STRIP: NEGATIVE
IMM GRANULOCYTES # BLD: 0 10E9/L (ref 0–0.4)
IMM GRANULOCYTES NFR BLD: 0.3 %
INR PPP: 1.05 (ref 0.86–1.14)
KETONES UR STRIP-MCNC: NEGATIVE MG/DL
LEUKOCYTE ESTERASE UR QL STRIP: NEGATIVE
LIPASE SERPL-CCNC: 147 U/L (ref 73–393)
LYMPHOCYTES # BLD AUTO: 3.7 10E9/L (ref 0.8–5.3)
LYMPHOCYTES NFR BLD AUTO: 31.4 %
MCH RBC QN AUTO: 32.2 PG (ref 26.5–33)
MCHC RBC AUTO-ENTMCNC: 34.7 G/DL (ref 31.5–36.5)
MCV RBC AUTO: 93 FL (ref 78–100)
MONOCYTES # BLD AUTO: 0.7 10E9/L (ref 0–1.3)
MONOCYTES NFR BLD AUTO: 6.1 %
MUCOUS THREADS #/AREA URNS LPF: PRESENT /LPF
NEUTROPHILS # BLD AUTO: 6.8 10E9/L (ref 1.6–8.3)
NEUTROPHILS NFR BLD AUTO: 57.6 %
NITRATE UR QL: NEGATIVE
NRBC # BLD AUTO: 0 10*3/UL
NRBC BLD AUTO-RTO: 0 /100
PH UR STRIP: 5.5 PH (ref 5–7)
PLATELET # BLD AUTO: 193 10E9/L (ref 150–450)
POTASSIUM SERPL-SCNC: 3.6 MMOL/L (ref 3.4–5.3)
PROT SERPL-MCNC: 6.2 G/DL (ref 6.8–8.8)
RBC # BLD AUTO: 4.04 10E12/L (ref 4.4–5.9)
RBC #/AREA URNS AUTO: <1 /HPF (ref 0–2)
SODIUM SERPL-SCNC: 142 MMOL/L (ref 133–144)
SOURCE: ABNORMAL
SP GR UR STRIP: 1.01 (ref 1–1.03)
SPECIMEN EXP DATE BLD: NORMAL
UROBILINOGEN UR STRIP-MCNC: NORMAL MG/DL (ref 0–2)
WBC # BLD AUTO: 11.8 10E9/L (ref 4–11)
WBC #/AREA URNS AUTO: 3 /HPF (ref 0–5)

## 2018-03-22 PROCEDURE — 99285 EMERGENCY DEPT VISIT HI MDM: CPT | Mod: 25 | Performed by: INTERNAL MEDICINE

## 2018-03-22 PROCEDURE — 86900 BLOOD TYPING SEROLOGIC ABO: CPT | Performed by: INTERNAL MEDICINE

## 2018-03-22 PROCEDURE — 25000128 H RX IP 250 OP 636: Performed by: INTERNAL MEDICINE

## 2018-03-22 PROCEDURE — 76775 US EXAM ABDO BACK WALL LIM: CPT | Performed by: INTERNAL MEDICINE

## 2018-03-22 PROCEDURE — 76775 US EXAM ABDO BACK WALL LIM: CPT | Mod: 26 | Performed by: INTERNAL MEDICINE

## 2018-03-22 PROCEDURE — 99284 EMERGENCY DEPT VISIT MOD MDM: CPT | Mod: 25 | Performed by: INTERNAL MEDICINE

## 2018-03-22 PROCEDURE — 96360 HYDRATION IV INFUSION INIT: CPT | Performed by: INTERNAL MEDICINE

## 2018-03-22 PROCEDURE — 81001 URINALYSIS AUTO W/SCOPE: CPT | Performed by: INTERNAL MEDICINE

## 2018-03-22 PROCEDURE — 96361 HYDRATE IV INFUSION ADD-ON: CPT | Performed by: INTERNAL MEDICINE

## 2018-03-22 PROCEDURE — 86901 BLOOD TYPING SEROLOGIC RH(D): CPT | Performed by: INTERNAL MEDICINE

## 2018-03-22 PROCEDURE — 80053 COMPREHEN METABOLIC PANEL: CPT | Performed by: INTERNAL MEDICINE

## 2018-03-22 PROCEDURE — 74176 CT ABD & PELVIS W/O CONTRAST: CPT

## 2018-03-22 PROCEDURE — 85025 COMPLETE CBC W/AUTO DIFF WBC: CPT | Performed by: INTERNAL MEDICINE

## 2018-03-22 PROCEDURE — 85610 PROTHROMBIN TIME: CPT | Performed by: INTERNAL MEDICINE

## 2018-03-22 PROCEDURE — 86850 RBC ANTIBODY SCREEN: CPT | Performed by: INTERNAL MEDICINE

## 2018-03-22 PROCEDURE — 83690 ASSAY OF LIPASE: CPT | Performed by: INTERNAL MEDICINE

## 2018-03-22 RX ORDER — SIMETHICONE 125 MG
125 TABLET,CHEWABLE ORAL 4 TIMES DAILY PRN
Qty: 30 TABLET | Refills: 0 | Status: SHIPPED | OUTPATIENT
Start: 2018-03-22

## 2018-03-22 RX ADMIN — SODIUM CHLORIDE 1000 ML: 900 INJECTION, SOLUTION INTRAVENOUS at 12:00

## 2018-03-22 ASSESSMENT — ENCOUNTER SYMPTOMS
HEMATURIA: 0
SHORTNESS OF BREATH: 0
ABDOMINAL PAIN: 1
FEVER: 0
FLANK PAIN: 1
DYSURIA: 0

## 2018-03-22 NOTE — ED PROVIDER NOTES
History     Chief Complaint   Patient presents with     Abdominal Pain     Back Pain     Dysuria     HPI  Rolando Gonzalez is a 77 year old male with history of type 2 diabetes, hypertension, hyperlipidemia, and kidney stone who presents to the emergency department today for evaluation of abdominal and flank pain.  The patient reports over the past 2.5 weeks he has been having mild pain in both sides of his lower abdomen/flanks.  He is noted the pain is worse when he makes certain movements with his torso, such as twisting.  This pain has been worsening over the last couple of days, prompting arrival to the emergency department.  The patient reports that his experiences similar pain a couple of years ago related to kidney stone, and he is concerned that he has recurrence of kidney stone now.  He states he has had 2 previous stones in the past.  The patient denies any hematuria or dysuria.  He denies any fevers or any vomiting.      No current facility-administered medications for this encounter.      Current Outpatient Prescriptions   Medication     simethicone (MYLICON) 125 MG CHEW chewable tablet     glipiZIDE (GLUCOTROL) 10 MG tablet     solifenacin (VESICARE) 5 MG tablet     gabapentin (NEURONTIN) 300 MG capsule     losartan (COZAAR) 100 MG tablet     amLODIPine (NORVASC) 10 MG tablet     omeprazole (PRILOSEC) 40 MG capsule     simvastatin (ZOCOR) 20 MG tablet     metFORMIN modified (GLUMETZA) 500 MG 24 hr tablet     chlorthalidone (HYGROTON) 25 MG tablet     celecoxib (CELEBREX) 200 MG capsule     allopurinol (ZYLOPRIM) 300 MG tablet     albuterol (PROAIR HFA, PROVENTIL HFA, VENTOLIN HFA) 108 (90 BASE) MCG/ACT inhaler     Cholecalciferol (VITAMIN D-3) 1000 UNITS CAPS     saw palmetto 160 MG CAPS     insulin glargine (LANTUS SOLOSTAR) 100 UNIT/ML injection     insulin pen needle (B-D U/F) 31G X 8 MM     AMOXICILLIN PO     budesonide-formoterol (SYMBICORT) 80-4.5 MCG/ACT Inhaler     fluticasone-salmeterol  (ADVAIR) 500-50 MCG/DOSE diskus inhaler     blood glucose monitoring (ACCU-CHEK MERY) test strip     camphor-menthol (DERMASARRA) 0.5-0.5 % LOTN     cyclobenzaprine (FLEXERIL) 5 MG tablet     Dentifrices (BIOTENE DRY MOUTH) PSTE     indomethacin (INDOCIN) 50 MG capsule     HYDROcodone-acetaminophen (NORCO) 5-325 MG per tablet     aspirin 81 MG tablet     tiotropium (SPIRIVA HANDIHALER) 18 MCG inhalation capsule     fluticasone (FLOVENT HFA) 110 MCG/ACT inhaler     fluticasone (FLONASE) 50 MCG/ACT nasal spray     Past Medical History:   Diagnosis Date     Asthma, moderate persistent      Degenerative tear of meniscus      Diabetes mellitus type 2, insulin dependent (H)      Diabetic neuropathy (H)     improved with gabapentin; normal EMG 1/26/18     Diverticulosis      ED (erectile dysfunction)      Edema extremities 2/15/16    venous insufficiency     GERD (gastroesophageal reflux disease)      Gout      Hyperlipidemia      Hypertension      Moderate persistent asthma 2017    MILLI Álvarez MD allergist.  FEV1 1.83. FEF 25-75 1.5 (3/13/17)     Osteoarthritis, shoulder     right     RLL lung nodule 03/20/2017    stable 8 mm rll nodule.  No additional CT testing needed.       Urge incontinence      Vasomotor rhinitis        Past Surgical History:   Procedure Laterality Date     CATARACT IOL, RT/LT      both eyes     L4-5 fusion         Family History   Problem Relation Age of Onset     CANCER Paternal Grandmother        Social History   Substance Use Topics     Smoking status: Passive Smoke Exposure - Never Smoker     Last attempt to quit: 2/10/1982     Smokeless tobacco: Never Used     Alcohol use 0.5 oz/week     1 drink(s) per week     Allergies   Allergen Reactions     Morphine Hcl Other (See Comments)     Sweating      Seasonal Allergies Difficulty breathing     Sinus congestion, sneezing       I have reviewed the Medications, Allergies, Past Medical and Surgical History, and Social History in the Epic  "system.    Review of Systems   Constitutional: Negative for fever.   Respiratory: Negative for shortness of breath.    Cardiovascular: Negative for chest pain.   Gastrointestinal: Positive for abdominal pain (Bilateral lower abdomen).   Genitourinary: Positive for flank pain (Bilateral). Negative for dysuria and hematuria.   All other systems reviewed and are negative.      Physical Exam   BP: 124/68  Pulse: 88  Temp: 97.5  F (36.4  C)  Resp: 18  Height: 171.5 cm (5' 7.5\")  Weight: 95.3 kg (210 lb)  SpO2: 96 %      Physical Exam   Constitutional: No distress.   HENT:   Head: Atraumatic.   Mouth/Throat: Oropharynx is clear and moist. No oropharyngeal exudate.   Eyes: Pupils are equal, round, and reactive to light. No scleral icterus.   Neck: Neck supple. No JVD present.   Cardiovascular: Normal rate, normal heart sounds and intact distal pulses.  Exam reveals no gallop and no friction rub.    No murmur heard.  Pulmonary/Chest: Effort normal and breath sounds normal. No respiratory distress. He has no wheezes. He has no rales. He exhibits no tenderness.   Abdominal: Soft. Bowel sounds are normal. There is no hepatosplenomegaly. There is tenderness in the left upper quadrant. There is no rigidity, no rebound, no guarding, no CVA tenderness, no tenderness at McBurney's point and negative Silva's sign. No hernia.       Musculoskeletal: He exhibits no edema or tenderness.   Skin: Skin is warm. No rash noted. He is not diaphoretic.       ED Course     ED Course     Procedures  Results for orders placed during the hospital encounter of 03/22/18   POC US RETROPERITONEAL LIMITED    Impression Limited Bedside Renal Ultrasound, performed and interpreted by me.    Indication: Flank pain  Images of the the right and left kidney were acquired in short and long axis, evaluating for hydronephrosis. A short and long axis of the bladder was evaluated for bladder stones. Image quality was satisfactory..     Findings:  No evidence of " hydronephrosis in bilateral kidneys.    No urinary bladder stones seen.         IMPRESSION: No evidence of hydronephrosis or stones. But right kidney small.    [facility] Procedure Note      Limited Bedside ED Aorta Ultrasound:    PROCEDURE: PERFORMED BY: Dr. Bandar Santoro MD  INDICATIONS:  Flank pain    PROBE:  Low frequency convex probe  BODY LOCATION: Abdomen  FINDINGS:  The ultrasound was performed using longitudinal and transverse views.   Normal: Abdominal aorta < 4 cm.  MEASUREMENT:  1.9 cm   No evidence of free fluid in hepatorenal (Morison s pouch), perisplenic, or and pelvic areas. No evidence of pericardial effusion.  INTERPRETATION:  The evaluation of the aorta was of normal caliber (ie < 4cm in the transverse/longitudinal views) without evidence of aneurysm or dilation.  Aorta visualized in entirety from insertion into the intra-abdominal cavity to bifurcation into iliac vessels. There was no abdominal free fluid.  IMAGE DOCUMENTATION: Images were archived to PACs system.          Results for orders placed or performed during the hospital encounter of 03/22/18 (from the past 24 hour(s))   POC US RETROPERITONEAL LIMITED     Status: None    Collection Time: 03/22/18 11:59 AM    Impression    Limited Bedside Renal Ultrasound, performed and interpreted by me.    Indication: Flank pain  Images of the the right and left kidney were acquired in short and long axis, evaluating for hydronephrosis. A short and long axis of the bladder was evaluated for bladder stones. Image quality was satisfactory..     Findings:  No evidence of hydronephrosis in bilateral kidneys.    No urinary bladder stones seen.         IMPRESSION: No evidence of hydronephrosis or stones. But right kidney small.    [facility] Procedure Note      Limited Bedside ED Aorta Ultrasound:    PROCEDURE: PERFORMED BY: Dr. Bandar Santoro MD  INDICATIONS:  Flank pain    PROBE:  Low frequency convex probe  BODY LOCATION:  Abdomen  FINDINGS:  The ultrasound was performed using longitudinal and transverse views.   Normal: Abdominal aorta < 4 cm.  MEASUREMENT:  1.9 cm   No evidence of free fluid in hepatorenal (Morison s pouch), perisplenic, or and pelvic areas. No evidence of pericardial effusion.  INTERPRETATION:  The evaluation of the aorta was of normal caliber (ie < 4cm in the transverse/longitudinal views) without evidence of aneurysm or dilation.  Aorta visualized in entirety from insertion into the intra-abdominal cavity to bifurcation into iliac vessels. There was no abdominal free fluid.  IMAGE DOCUMENTATION: Images were archived to PACs system.     UA with Microscopic     Status: Abnormal    Collection Time: 03/22/18 12:08 PM   Result Value Ref Range    Color Urine Yellow     Appearance Urine Clear     Glucose Urine Negative NEG^Negative mg/dL    Bilirubin Urine Negative NEG^Negative    Ketones Urine Negative NEG^Negative mg/dL    Specific Gravity Urine 1.015 1.003 - 1.035    Blood Urine Negative NEG^Negative    pH Urine 5.5 5.0 - 7.0 pH    Protein Albumin Urine Negative NEG^Negative mg/dL    Urobilinogen mg/dL Normal 0.0 - 2.0 mg/dL    Nitrite Urine Negative NEG^Negative    Leukocyte Esterase Urine Negative NEG^Negative    Source Midstream Urine     WBC Urine 3 0 - 5 /HPF    RBC Urine <1 0 - 2 /HPF    Mucous Urine Present (A) NEG^Negative /LPF   CBC with platelets differential     Status: Abnormal    Collection Time: 03/22/18 12:29 PM   Result Value Ref Range    WBC 11.8 (H) 4.0 - 11.0 10e9/L    RBC Count 4.04 (L) 4.4 - 5.9 10e12/L    Hemoglobin 13.0 (L) 13.3 - 17.7 g/dL    Hematocrit 37.5 (L) 40.0 - 53.0 %    MCV 93 78 - 100 fl    MCH 32.2 26.5 - 33.0 pg    MCHC 34.7 31.5 - 36.5 g/dL    RDW 13.9 10.0 - 15.0 %    Platelet Count 193 150 - 450 10e9/L    Diff Method Automated Method     % Neutrophils 57.6 %    % Lymphocytes 31.4 %    % Monocytes 6.1 %    % Eosinophils 4.1 %    % Basophils 0.5 %    % Immature Granulocytes 0.3 %     Nucleated RBCs 0 0 /100    Absolute Neutrophil 6.8 1.6 - 8.3 10e9/L    Absolute Lymphocytes 3.7 0.8 - 5.3 10e9/L    Absolute Monocytes 0.7 0.0 - 1.3 10e9/L    Absolute Eosinophils 0.5 0.0 - 0.7 10e9/L    Absolute Basophils 0.1 0.0 - 0.2 10e9/L    Abs Immature Granulocytes 0.0 0 - 0.4 10e9/L    Absolute Nucleated RBC 0.0    ABO/Rh type and screen     Status: None    Collection Time: 03/22/18 12:29 PM   Result Value Ref Range    ABO A     RH(D) Pos     Antibody Screen Neg     Test Valid Only At          [facility]    Specimen Expires 03/25/2018    INR     Status: None    Collection Time: 03/22/18 12:29 PM   Result Value Ref Range    INR 1.05 0.86 - 1.14   Comprehensive metabolic panel     Status: Abnormal    Collection Time: 03/22/18 12:29 PM   Result Value Ref Range    Sodium 142 133 - 144 mmol/L    Potassium 3.6 3.4 - 5.3 mmol/L    Chloride 111 (H) 94 - 109 mmol/L    Carbon Dioxide 20 20 - 32 mmol/L    Anion Gap 12 3 - 14 mmol/L    Glucose 115 (H) 70 - 99 mg/dL    Urea Nitrogen 30 7 - 30 mg/dL    Creatinine 1.66 (H) 0.66 - 1.25 mg/dL    GFR Estimate 40 (L) >60 mL/min/1.7m2    GFR Estimate If Black 49 (L) >60 mL/min/1.7m2    Calcium 8.7 8.5 - 10.1 mg/dL    Bilirubin Total 0.5 0.2 - 1.3 mg/dL    Albumin 3.2 (L) 3.4 - 5.0 g/dL    Protein Total 6.2 (L) 6.8 - 8.8 g/dL    Alkaline Phosphatase 60 40 - 150 U/L    ALT 25 0 - 70 U/L    AST 10 0 - 45 U/L   Lipase     Status: None    Collection Time: 03/22/18 12:29 PM   Result Value Ref Range    Lipase 147 73 - 393 U/L   CT Abdomen Pelvis w/o Contrast     Status: None    Collection Time: 03/22/18  1:59 PM    Narrative    EXAMINATION: CT ABDOMEN PELVIS W/O CONTRAST, 3/22/2018 1:59 PM    TECHNIQUE:  Helical CT images from the lung bases through the  symphysis pubis were obtained without IV contrast.    COMPARISON: CT abdomen/pelvis 8/21/2009    HISTORY: left flank pain;     FINDINGS:    Abdomen and pelvis: The right kidney is  abnormal in axis of rotation  and is positioned lower in the pelvis than is typical. The right renal  artery arises from the distal abdominal aorta. There are at least 2  right renal veins, one of the transiting to the IVC in one of the  drains into the left common iliac vein. The left kidney is normal in  axis and position.    Calcification in the renal sinus of the interpolar region of the right  kidney (series 5 image 252) is slightly increased in size since CT  8/21/2009, favored to represent vascular calcification. No  urolithiasis. Mildly lobulated contour to both kidneys is unchanged  since 2009. No hydronephrosis. Urinary bladder is normal. Prostate  gland is enlarged measuring 4.4 x 4.3 x 5.2 cm.    The liver is enlarged measuring 21.1 cm in length, not significantly  changed since 2009. Otherwise, noncontrast appearance to the liver,  spleen, adrenals, and pancreas is normal. Gallbladder is not  visualized, similar to prior exam, possibly surgically absent. No  dilated small or large bowel. Diverticulosis without evidence of  diverticulitis. Unchanged thickening along the left  retroperitoneum/lateral coronal fascia. Normal appendix. No  intraperitoneal free fluid, free air, pneumatosis, or portal venous  gas. No abdominal aortic aneurysm. Borderline enlarged dory hepatis  lymph node (series 5 image 106) measures 12 mm, unchanged.    Lung bases: The visualized portions of the lower lungs are clear.  Calcification along the pleural surface of the left hemidiaphragm,  increased since 2009.    Bones and soft tissues: Degenerative changes in the lumbar spine.  Postsurgical changes of anterior fusion of L5-S1. Bilateral pars  interarticularis defects of L5, unchanged. Increased heterogeneous  lucency in the right lateral aspect of the L4 vertebral body, likely  degenerative in etiology. Bone island in the left acetabulum. No acute  or suspicious osseous lesion.      Impression    IMPRESSION:   1. No acute  findings in the abdomen/pelvis to explain the patient's  left flank pain. Calcification in the interpolar region of the right  kidney is slightly increased in size since CT 8/21/2009, favored to  represent vascular calcification. No urolithiasis or hydronephrosis.   2. Focally advanced degenerative changes at L5-S1 consisting of  bilateral pars interarticularis defects, severe disc space loss, and  significant bilateral neuroforaminal narrowing. There are also  postsurgical changes of anterior fusion at L5-S1.  3. Increased pleural calcifications over the left hemidiaphragmatic  surface since 2009.  Differential includes the sequela of prior  trauma/hemothorax versus less likely related to asbestos exposure  given lack of pleural calcification seen elsewhere in the chest.   4. Unchanged hepatomegaly since 2009.    I have personally reviewed the examination and initial interpretation  and I agree with the findings.    MARLENE BALTAZAR MD           Labs Ordered and Resulted from Time of ED Arrival Up to the Time of Departure from the ED   CBC WITH PLATELETS DIFFERENTIAL - Abnormal; Notable for the following:        Result Value    WBC 11.8 (*)     RBC Count 4.04 (*)     Hemoglobin 13.0 (*)     Hematocrit 37.5 (*)     All other components within normal limits   COMPREHENSIVE METABOLIC PANEL - Abnormal; Notable for the following:     Chloride 111 (*)     Glucose 115 (*)     Creatinine 1.66 (*)     GFR Estimate 40 (*)     GFR Estimate If Black 49 (*)     Albumin 3.2 (*)     Protein Total 6.2 (*)     All other components within normal limits   ROUTINE UA WITH MICROSCOPIC - Abnormal; Notable for the following:     Mucous Urine Present (*)     All other components within normal limits   INR   LIPASE   ABO/RH TYPE AND SCREEN            Assessments & Plan (with Medical Decision Making)  Left flank upper quadrant pain etiology not clear but UA labs POCUS CT all neg, pain ok after ibuprofen he took at home, will DC with  simethicon prn and follow up with his PMD.       I have reviewed the nursing notes.    I have reviewed the findings, diagnosis, plan and need for follow up with the patient.    Discharge Medication List as of 3/22/2018  3:24 PM      START taking these medications    Details   simethicone (MYLICON) 125 MG CHEW chewable tablet Take 1 tablet (125 mg) by mouth 4 times daily as needed for intestinal gas, Disp-30 tablet, R-0, Local Print             Final diagnoses:   Left flank pain     I, Obdulio Gilbert, am serving as a trained medical scribe to document services personally performed by Bandar Santoro MD, based on the provider's statements to me.      IBandar MD, was physically present and have reviewed and verified the accuracy of this note documented by Obdulio Gilbert.    3/22/2018   [facility], [place], EMERGENCY DEPARTMENT     Bandar Santoro MD  03/22/18 [id number]

## 2018-03-22 NOTE — ED AVS SNAPSHOT
[facility], [place], Emergency Department    [address] [place] [id number]    Phone:  [phone]                                       Rolando Gonzalez   MRN: [id number]    Department:  [facility], Emergency Department   Date of Visit:  3/22/2018           After Visit Summary Signature Page     I have received my discharge instructions, and my questions have been answered. I have discussed any challenges I see with this plan with the nurse or doctor.    ..........................................................................................................................................  Patient/Patient Representative Signature      ..........................................................................................................................................  Patient Representative Print Name and Relationship to Patient    ..................................................               ................................................  Date                                            Time    ..........................................................................................................................................  Reviewed by Signature/Title    ...................................................              ..............................................  Date                                                            Time

## 2018-03-22 NOTE — ED AVS SNAPSHOT
[facility], Emergency Department    [address] [facility] [address]    Phone:  [phone]                                       Rolando Gonzalez   MRN: [id number]    Department:  [facility], Emergency Department   Date of Visit:  3/22/2018           Patient Information     Date Of Birth          1940        Your diagnoses for this visit were:     Left flank pain        You were seen by Bandar Santoro MD.        Discharge Instructions       Please make an appointment to follow up with Your Primary Care Provider in 3-7 days if not improving.     Discharge References/Attachments     FLANK PAIN, UNCERTAIN CAUSE (ENGLISH)      Your next 10 appointments already scheduled     Mar 27, 2018  4:30 PM CDT   (Arrive by 4:15 PM)   ACUTE/CHRONIC SINGLE CONDITION with Denise Russ MD   [facility] ([facility])    [address] [place] [address]  [facility] [address] [phone]              24 Hour Appointment Hotline       To make an appointment at any [facility], call [phone] ([phone]). If you don't have a family doctor or clinic, we will help you find one. [place] clinics are conveniently located to serve the needs of you and your family.             Review of your medicines      START taking        Dose / Directions Last dose taken    simethicone 125 MG Chew chewable tablet   Commonly known as:  MYLICON   Dose:  125 mg   Quantity:  30 tablet        Take 1 tablet (125 mg) by mouth 4 times daily as needed for intestinal gas   Refills:  0          Our records show that you are taking the medicines listed below. If these are incorrect, please call your family doctor or clinic.        Dose / Directions Last dose taken    albuterol 108 (90 BASE) MCG/ACT Inhaler   Commonly known as:  PROAIR HFA/PROVENTIL HFA/VENTOLIN HFA   Dose:  2 puff        Inhale 2 puffs into the lungs every 6 hours   Refills:  0        allopurinol 300 MG tablet    Commonly known as:  ZYLOPRIM   Dose:  300 mg   Quantity:  90 tablet        Take 1 tablet (300 mg) by mouth daily   Refills:  3        amLODIPine 10 MG tablet   Commonly known as:  NORVASC   Dose:  10 mg   Quantity:  90 tablet        Take 1 tablet (10 mg) by mouth daily   Refills:  3        AMOXICILLIN PO   Dose:  1000 mg        Take 1,000 mg by mouth daily   Refills:  0        aspirin 81 MG tablet        Take by mouth daily   Refills:  0        BIOTENE DRY MOUTH Pste   Dose:  1 spray        Apply 1 spray to affected area   Refills:  0        blood glucose monitoring test strip   Commonly known as:  ACCU-CHEK MERY   Quantity:  200 each        Test 2 times daily   Refills:  3        budesonide-formoterol 80-4.5 MCG/ACT Inhaler   Commonly known as:  SYMBICORT   Dose:  2 puff   Quantity:  1 Inhaler        Inhale 2 puffs into the lungs 2 times daily   Refills:  1        camphor-menthol 0.5-0.5 % Lotn   Commonly known as:  DERMASARRA   Quantity:  1 Bottle        Applied to skin 3 times day p.r.n. for itching   Refills:  3        celecoxib 200 MG capsule   Commonly known as:  celeBREX   Dose:  200 mg   Quantity:  90 capsule        Take 1 capsule (200 mg) by mouth daily   Refills:  3        chlorthalidone 25 MG tablet   Commonly known as:  HYGROTON   Quantity:  45 tablet        Take one half tablet every morning.  Discontinue the hydrochlorothiazide 12.5 mg   Refills:  3        cyclobenzaprine 5 MG tablet   Commonly known as:  FLEXERIL   Quantity:  42 tablet        Take one or two daily as needed for severe neck neck pain.   Refills:  0        fluticasone 110 MCG/ACT Inhaler   Commonly known as:  FLOVENT HFA   Dose:  2 puff   Quantity:  1 Inhaler        Inhale 2 puffs into the lungs 2 times daily   Refills:  12        fluticasone 50 MCG/ACT spray   Commonly known as:  FLONASE   Dose:  2 spray        2 sprays by Both Nostrils route daily.   Refills:  0        fluticasone-salmeterol 500-50 MCG/DOSE diskus inhaler    Commonly known as:  ADVAIR   Dose:  1 puff        Inhale 1 puff into the lungs every 12 hours   Refills:  0        gabapentin 300 MG capsule   Commonly known as:  NEURONTIN   Dose:  600 mg   Quantity:  360 capsule        Take 2 capsules (600 mg) by mouth 2 times daily   Refills:  3        glipiZIDE 10 MG tablet   Commonly known as:  GLUCOTROL   Dose:  10 mg   Quantity:  180 tablet        Take 1 tablet (10 mg) by mouth 2 times daily   Refills:  1        HYDROcodone-acetaminophen 5-325 MG per tablet   Commonly known as:  NORCO   Dose:  1 tablet   Quantity:  10 tablet        Take 1 tablet by mouth nightly as needed for moderate to severe pain   Refills:  0        indomethacin 50 MG capsule   Commonly known as:  INDOCIN   Dose:  50 mg   Quantity:  30 capsule        Take 1 capsule (50 mg) by mouth 3 times daily as needed Use only during a gout attack   Refills:  0        insulin glargine 100 UNIT/ML injection   Commonly known as:  LANTUS SOLOSTAR   Dose:  30 Units   Quantity:  60 mL        Inject 30 Units Subcutaneous 2 times daily   Refills:  1        insulin pen needle 31G X 8 MM   Commonly known as:  B-D U/F   Quantity:  200 each        Use twice daily or as directed. *3 month supply   Refills:  3        losartan 100 MG tablet   Commonly known as:  COZAAR   Dose:  100 mg   Quantity:  90 tablet        Take 1 tablet (100 mg) by mouth daily   Refills:  3        metFORMIN modified 500 MG 24 hr tablet   Commonly known as:  GLUMETZA   Quantity:  450 tablet        Take 2 tablets every morning, 1 tablet at noon, and 2 tablets at bedtime.   Refills:  3        omeprazole 40 MG capsule   Commonly known as:  priLOSEC   Dose:  40 mg   Quantity:  180 capsule        Take 1 capsule (40 mg) by mouth 2 times daily   Refills:  3        saw palmetto 160 MG Caps   Dose:  160 mg        Take 160 mg by mouth daily   Refills:  0        simvastatin 20 MG tablet   Commonly known as:  ZOCOR   Dose:  20 mg   Quantity:  90 tablet        Take 1  tablet (20 mg) by mouth At Bedtime   Refills:  2        solifenacin 5 MG tablet   Commonly known as:  VESICARE   Dose:  5 mg   Quantity:  90 tablet        Take 1 tablet (5 mg) by mouth daily   Refills:  2        tiotropium 18 MCG capsule   Commonly known as:  SPIRIVA HANDIHALER   Quantity:  30 capsule        Inhale contents of one capsule daily.   Refills:  1        Vitamin D-3 1000 UNITS Caps   Dose:  1000 Units        Take 1,000 Units by mouth daily   Refills:  0                Prescriptions were sent or printed at these locations (1 Prescription)                   Other Prescriptions                Printed at Department/Unit printer (1 of 1)         simethicone (MYLICON) 125 MG CHEW chewable tablet                Procedures and tests performed during your visit     ABO/Rh type and screen    CBC with platelets differential    CT Abdomen Pelvis w/o Contrast    Comprehensive metabolic panel    INR    Lipase    POC US RETROPERITONEAL LIMITED    UA with Microscopic      Orders Needing Specimen Collection     None      Pending Results     No orders found from 3/20/2018 to 3/23/2018.            Pending Culture Results     No orders found from 3/20/2018 to 3/23/2018.            Pending Results Instructions     If you had any lab results that were not finalized at the time of your Discharge, you can call the ED Lab Result RN at [phone]. You will be contacted by this team for any positive Lab results or changes in treatment. The nurses are available 7 days a week from 10A to 6:30P.  You can leave a message 24 hours per day and they will return your call.        Thank you for choosing [facility]       Thank you for choosing [facility] for your care. Our goal is always to provide you with excellent care. Hearing back from our patients is one way we can continue to improve our services. Please take a few minutes to complete the written survey that you may receive in the mail after you visit with us. Thank you!        Pauline  Information     [facility]) gives you secure access to your electronic health record. If you see a primary care provider, you can also send messages to your care team and make appointments. If you have questions, please call your primary care clinic.  If you do not have a primary care provider, please call [phone] and they will assist you.        Care EveryWhere ID     This is your Care EveryWhere ID. This could be used by other organizations to access your [place] medical records  [id number]        Equal Access to Services     HARDIK PARIS : Hadii [place] Sostanleyali, waaxda luqadaha, qaybta kaalmada ademelissa, yan goldberg. So [facility] [phone].    ATENCIÓN: Si habla español, tiene a redman disposición servicios gratuitos de asistencia lingüística. Llame al [phone].    We comply with applicable federal civil rights laws and [place] laws. We do not discriminate on the basis of race, color, national origin, age, disability, sex, sexual orientation, or gender identity.            After Visit Summary       This is your record. Keep this with you and show to your community pharmacist(s) and doctor(s) at your next visit.

## 2018-03-23 ENCOUNTER — DOCUMENTATION ONLY (OUTPATIENT)
Dept: VASCULAR SURGERY | Facility: CLINIC | Age: 78
End: 2018-03-23

## 2018-03-27 ENCOUNTER — OFFICE VISIT (OUTPATIENT)
Dept: INTERNAL MEDICINE | Facility: CLINIC | Age: 78
End: 2018-03-27
Payer: COMMERCIAL

## 2018-03-27 VITALS
DIASTOLIC BLOOD PRESSURE: 83 MMHG | WEIGHT: 217 LBS | SYSTOLIC BLOOD PRESSURE: 145 MMHG | BODY MASS INDEX: 33.49 KG/M2 | RESPIRATION RATE: 18 BRPM | HEART RATE: 107 BPM

## 2018-03-27 DIAGNOSIS — Z13.5 SCREENING FOR DIABETIC RETINOPATHY: Primary | ICD-10-CM

## 2018-03-27 DIAGNOSIS — N39.43 BENIGN PROSTATIC HYPERPLASIA WITH POST-VOID DRIBBLING: ICD-10-CM

## 2018-03-27 DIAGNOSIS — N40.1 BENIGN PROSTATIC HYPERPLASIA WITH POST-VOID DRIBBLING: ICD-10-CM

## 2018-03-27 ASSESSMENT — PAIN SCALES - GENERAL: PAINLEVEL: NO PAIN (0)

## 2018-03-27 NOTE — MR AVS SNAPSHOT
After Visit Summary   3/27/2018    Rolando Gonzalez    MRN: [id number]           Patient Information     Date Of Birth          1940        Visit Information        Provider Department      3/27/2018 4:30 PM Denise Russ MD [facility]        Today's Diagnoses     Screening for diabetic retinopathy    -  1    Benign prostatic hyperplasia with post-void dribbling           Follow-ups after your visit        Additional Services     UROLOGY ADULT REFERRAL       Your provider has referred you to: [facility] [place] [facility] [place] [phone]   [email or web address]    Please be aware that coverage of these services is subject to the terms and limitations of your health insurance plan.  Call member services at your health plan with any benefit or coverage questions.      Please bring the following with you to your appointment:    (1) Any X-Rays, CTs or MRIs which have been performed.  Contact the facility where they were done to arrange for  prior to your scheduled appointment.    (2) List of current medications  (3) This referral request   (4) Any documents/labs given to you for this referral                  Your next 10 appointments already scheduled     May 01, 2018 12:20 PM CDT   (Arrive by 12:05 PM)   NEW BENIGN PROSTATIC HYPERTROPHY with Haider Chen MD   [facility] ([facility])    [address] [place] [address] [phone]              Who to contact     Please call your clinic at [phone] to:    Ask questions about your health    Make or cancel appointments    Discuss your medicines    Learn about your test results    Speak to your doctor            Additional Information About Your Visit        MyChart Information     [employer] gives you secure access to your electronic health record. If you see a primary care  provider, you can also send messages to your care team and make appointments. If you have questions, please call your primary care clinic.  If you do not have a primary care provider, please call [phone] and they will assist you.      [employer] is an electronic gateway that provides easy, online access to your medical records. With [employer], you can request a clinic appointment, read your test results, renew a prescription or communicate with your care team.     To access your existing account, please contact your [facility] or call [phone] for assistance.        Care EveryWhere ID     This is your Care EveryWhere ID. This could be used by other organizations to access your [place] medical records  [id number]        Your Vitals Were     Pulse Respirations BMI (Body Mass Index)             107 18 33.49 kg/m2          Blood Pressure from Last 3 Encounters:   03/27/18 145/83   03/22/18 124/68   01/09/18 139/83    Weight from Last 3 Encounters:   03/27/18 98.4 kg (217 lb)   03/22/18 95.3 kg (210 lb)   11/07/17 102.2 kg (225 lb 4.8 oz)              We Performed the Following     UROLOGY ADULT REFERRAL        Primary Care Provider Office Phone # Fax #    Denise Russ [phone]       [address] [address]        Equal Access to Services     HARDIK PARIS : Hadii aad ku hadasho Sostanleyali, waaxda luqadaha, qaybta kaalmada adelorenayada, yan goldberg. So [facility] [phone].    ATENCIÓN: Si habla español, tiene a redman disposición servicios gratuitos de asistencia lingüística. Llame al [phone].    We comply with applicable federal civil rights laws and [place] laws. We do not discriminate on the basis of race, color, national origin, age, disability, sex, sexual orientation, or gender identity.            Thank you!     Thank you for choosing [facility]  for your care. Our goal is always to  provide you with excellent care. Hearing back from our patients is one way we can continue to improve our services. Please take a few minutes to complete the written survey that you may receive in the mail after your visit with us. Thank you!             Your Updated Medication List - Protect others around you: Learn how to safely use, store and throw away your medicines at www.disposemymeds.org.          This list is accurate as of 3/27/18  5:19 PM.  Always use your most recent med list.                   Brand Name Dispense Instructions for use Diagnosis    albuterol 108 (90 BASE) MCG/ACT Inhaler    PROAIR HFA/PROVENTIL HFA/VENTOLIN HFA     Inhale 2 puffs into the lungs every 6 hours        allopurinol 300 MG tablet    ZYLOPRIM    90 tablet    Take 1 tablet (300 mg) by mouth daily    Gout, unspecified       amLODIPine 10 MG tablet    NORVASC    90 tablet    Take 1 tablet (10 mg) by mouth daily    Essential hypertension with goal blood pressure less than 130/80       AMOXICILLIN PO      Take 1,000 mg by mouth daily        aspirin 81 MG tablet      Take by mouth daily    Hearing loss, unspecified laterality, Fatigue, Diabetes mellitus type 2, insulin dependent (H), Hyperlipidemia, COPD (chronic obstructive pulmonary disease) (H)       BIOTENE DRY MOUTH Pste      Apply 1 spray to affected area        blood glucose monitoring test strip    ACCU-CHEK MERY    200 each    Test 2 times daily    Diabetes mellitus (H)       budesonide-formoterol 80-4.5 MCG/ACT Inhaler    SYMBICORT    1 Inhaler    Inhale 2 puffs into the lungs 2 times daily    Moderate persistent asthma without complication       camphor-menthol 0.5-0.5 % Lotn    DERMASARRA    1 Bottle    Applied to skin 3 times day p.r.n. for itching    Itching       celecoxib 200 MG capsule    celeBREX    90 capsule    Take 1 capsule (200 mg) by mouth daily    Diabetes mellitus type 2, uncontrolled (H)       chlorthalidone 25 MG tablet    HYGROTON    45 tablet    Take one  half tablet every morning.  Discontinue the hydrochlorothiazide 12.5 mg    Edema, unspecified type       cyclobenzaprine 5 MG tablet    FLEXERIL    42 tablet    Take one or two daily as needed for severe neck neck pain.    Neck pain       fluticasone 110 MCG/ACT Inhaler    FLOVENT HFA    1 Inhaler    Inhale 2 puffs into the lungs 2 times daily    Asthma       fluticasone 50 MCG/ACT spray    FLONASE     2 sprays by Both Nostrils route daily.    Diabetes mellitus type 2, uncontrolled (H), Hyperlipidemia       fluticasone-salmeterol 500-50 MCG/DOSE diskus inhaler    ADVAIR     Inhale 1 puff into the lungs every 12 hours        gabapentin 300 MG capsule    NEURONTIN    360 capsule    Take 2 capsules (600 mg) by mouth 2 times daily    Diabetic nephropathy (H)       glipiZIDE 10 MG tablet    GLUCOTROL    180 tablet    Take 1 tablet (10 mg) by mouth 2 times daily    Diabetes mellitus type 2, uncontrolled (H)       HYDROcodone-acetaminophen 5-325 MG per tablet    NORCO    10 tablet    Take 1 tablet by mouth nightly as needed for moderate to severe pain    Rib pain       indomethacin 50 MG capsule    INDOCIN    30 capsule    Take 1 capsule (50 mg) by mouth 3 times daily as needed Use only during a gout attack    Gout, unspecified       insulin glargine 100 UNIT/ML injection    LANTUS SOLOSTAR    60 mL    Inject 30 Units Subcutaneous 2 times daily    Diabetes mellitus type 2, uncontrolled (H)       insulin pen needle 31G X 8 MM    B-D U/F    200 each    Use twice daily or as directed. *3 month supply    Diabetes mellitus type 2, uncontrolled (H)       losartan 100 MG tablet    COZAAR    90 tablet    Take 1 tablet (100 mg) by mouth daily    Essential hypertension with goal blood pressure less than 130/80       metFORMIN modified 500 MG 24 hr tablet    GLUMETZA    450 tablet    Take 2 tablets every morning, 1 tablet at noon, and 2 tablets at bedtime.    Diabetes mellitus type 2, insulin dependent (H)       omeprazole 40 MG  capsule    priLOSEC    180 capsule    Take 1 capsule (40 mg) by mouth 2 times daily    Esophageal reflux       saw palmetto 160 MG Caps      Take 160 mg by mouth daily        simethicone 125 MG Chew chewable tablet    MYLICON    30 tablet    Take 1 tablet (125 mg) by mouth 4 times daily as needed for intestinal gas        simvastatin 20 MG tablet    ZOCOR    90 tablet    Take 1 tablet (20 mg) by mouth At Bedtime    Hyperlipemia       solifenacin 5 MG tablet    VESICARE    90 tablet    Take 1 tablet (5 mg) by mouth daily    Urgency incontinence       tiotropium 18 MCG capsule    SPIRIVA HANDIHALER    30 capsule    Inhale contents of one capsule daily.    Intermittent asthma       Vitamin D-3 1000 UNITS Caps      Take 1,000 Units by mouth daily

## 2018-03-27 NOTE — NURSING NOTE
Chief Complaint   Patient presents with     Recheck Medication     Patient is here to follow up with medications     Fernanda Singletary CMA 4:29 PM on 3/27/2018.

## 2018-03-27 NOTE — PROGRESS NOTES
[facility]   Denise Russ MD  03/27/2018      Chief Complaint:   Recheck Medication       History of Present Illness:   Rolando Gonzalez is a 77 year old male with a history of hypertension, hyperlipidemia, type 2 diabetes mellitus, major depression, moderate persistent asthma, and kidney stone, who presents with his friend, Naseem Rodriguez, for ER follow-up.    March 22, 2018 ER Visit: The patient was just evaluated in the emergency department in regard to left abdominal and flank pain which was exacerbated with movement. He felt that his pain was similar to the pain that he has experienced in the past with his previous kidney stone. His work up while in the ER consisted of a CT abdomen/pelvis, abdominal ultrasound, and numerous laboratory studies that were reviewed with patient in clinic today. His laboratory studies were remarkable for a slightly decreased kidney function. We reviewed all of these results together today.  The patient's imaging did not reveal kidney stone or other intraabdominal pathology causing his pain.     Kidney Stone: He reports a history of kidney stones, with an instance 2-3 years ago in which he was informed about a large non obstructing kidney stone. He is unsure if this stone has passed or not. During laboratory testing while in the ER, the patient's kidney function resulted unremarkable.     Constipation: The patient reports that he has not been eating properly as he does not cook. He mentions that for about a month he was solely eating cheese and salami for breakfast and lunch. This did lead to the patient becoming increasingly constipated. He has been taking metamucil once dailyand Gas-X and experiencing slight relief. He also takes docusate and has taken two in the last 4-5 days. He does mention however that this morning and this afternoon he did excrete miniscule amounts of stool while straining to pass a bowel movement.  He does not have rectal bleeding or  "perianal itching.  When we discussed his diet, he tells me he hates almost all vegetables.    In regard to the patient's weight loss, he reports that some nights he will not eat dinner because he is \"sad.\" The patient has been losing weight and attributes this to his overall poor eating habits. (see below)    Frequent urination and urgency: The patient has been experiencing decreased urinary flow that seems to be getting worse since his last visit. He reports that his urine is very \"thick\" and heavy, appearing more concentrated in color. He has been taking that's the care 5 mg which he believes has turned his urine very yellow. He also notes increased urinary urgency and \"dribbling\" after urination with the need of a towel to dab the excess urine. The patient reports using the restroom up to 5 times during the night, with an average of 2-3 times per night. He does however report some relief of his past urinary concerns using vesicare. Of note, his PSA was found to be 2.97 on 11/07/2018.  His GFR is 38 to 40.    Sadness: The patient's wife did pass away about two years ago. He has recently been experiencing increased sadness, poor energy levels, inability to concentrate, and crankiness/irritability. He reports that a potential partner/friend may start living with him and improve his sadness following his wife's death. He states that she is caring and very \"beneficial\" for him. He believes that she will take his sadness away. He denies trouble falling asleep and reports that he goes to sleep early to get away from the world, sometimes going to bed at 05:00 at night. The patient is considering hypnosis for relief of his sadness.    Health Care Maintenance/Other:  1. Received blood testing previous check up  2. Considering hypnosis for \"sadness\"  3. Prostate check on last check up     Review of Systems:   Pertinent items are noted in HPI, remainder of complete ROS is negative.      Active Medications:      simethicone " (MYLICON) 125 MG CHEW chewable tablet, Take 1 tablet (125 mg) by mouth 4 times daily as needed for intestinal gas, Disp: 30 tablet, Rfl: 0     insulin glargine (LANTUS SOLOSTAR) 100 UNIT/ML injection, Inject 30 Units Subcutaneous 2 times daily, Disp: 60 mL, Rfl: 1     insulin pen needle (B-D U/F) 31G X 8 MM, Use twice daily or as directed. *3 month supply, Disp: 200 each, Rfl: 3     glipiZIDE (GLUCOTROL) 10 MG tablet, Take 1 tablet (10 mg) by mouth 2 times daily, Disp: 180 tablet, Rfl: 1     AMOXICILLIN PO, Take 1,000 mg by mouth daily, Disp: , Rfl:      solifenacin (VESICARE) 5 MG tablet, Take 1 tablet (5 mg) by mouth daily, Disp: 90 tablet, Rfl: 2     budesonide-formoterol (SYMBICORT) 80-4.5 MCG/ACT Inhaler, Inhale 2 puffs into the lungs 2 times daily, Disp: 1 Inhaler, Rfl: 1     fluticasone-salmeterol (ADVAIR) 500-50 MCG/DOSE diskus inhaler, Inhale 1 puff into the lungs every 12 hours, Disp: , Rfl:      gabapentin (NEURONTIN) 300 MG capsule, Take 2 capsules (600 mg) by mouth 2 times daily, Disp: 360 capsule, Rfl: 3     losartan (COZAAR) 100 MG tablet, Take 1 tablet (100 mg) by mouth daily, Disp: 90 tablet, Rfl: 3     amLODIPine (NORVASC) 10 MG tablet, Take 1 tablet (10 mg) by mouth daily, Disp: 90 tablet, Rfl: 3     omeprazole (PRILOSEC) 40 MG capsule, Take 1 capsule (40 mg) by mouth 2 times daily, Disp: 180 capsule, Rfl: 3     simvastatin (ZOCOR) 20 MG tablet, Take 1 tablet (20 mg) by mouth At Bedtime, Disp: 90 tablet, Rfl: 2     metFORMIN modified (GLUMETZA) 500 MG 24 hr tablet, Take 2 tablets every morning, 1 tablet at noon, and 2 tablets at bedtime., Disp: 450 tablet, Rfl: 3     chlorthalidone (HYGROTON) 25 MG tablet, Take one half tablet every morning.  Discontinue the hydrochlorothiazide 12.5 mg, Disp: 45 tablet, Rfl: 3     celecoxib (CELEBREX) 200 MG capsule, Take 1 capsule (200 mg) by mouth daily, Disp: 90 capsule, Rfl: 3     allopurinol (ZYLOPRIM) 300 MG tablet, Take 1 tablet (300 mg) by mouth daily,  Disp: 90 tablet, Rfl: 3     camphor-menthol (DERMASARRA) 0.5-0.5 % LOTN, Applied to skin 3 times day p.r.n. for itching, Disp: 1 Bottle, Rfl: 3     cyclobenzaprine (FLEXERIL) 5 MG tablet, Take one or two daily as needed for severe neck pain., Disp: 42 tablet, Rfl: 0     Dentifrices (BIOTENE DRY MOUTH) PSTE, Apply 1 spray to affected area, Disp: , Rfl:      indomethacin (INDOCIN) 50 MG capsule, Take 1 capsule (50 mg) by mouth 3 times daily as needed Use only during a gout attack, Disp: 30 capsule, Rfl: 0     HYDROcodone-acetaminophen (NORCO) 5-325 MG per tablet, Take 1 tablet by mouth nightly as needed for moderate to severe pain, Disp: 10 tablet, Rfl: 0     albuterol (PROAIR HFA, PROVENTIL HFA, VENTOLIN HFA) 108 (90 BASE) MCG/ACT inhaler, Inhale 2 puffs into the lungs every 6 hours, Disp: , Rfl:      Cholecalciferol (VITAMIN D-3) 1000 UNITS CAPS, Take 1,000 Units by mouth daily, Disp: , Rfl:      saw palmetto 160 MG CAPS, Take 160 mg by mouth daily, Disp: , Rfl:      aspirin 81 MG tablet, Take by mouth daily, Disp: , Rfl:      tiotropium (SPIRIVA HANDIHALER) 18 MCG inhalation capsule, Inhale contents of one capsule daily., Disp: 30 capsule, Rfl: 1     fluticasone (FLOVENT HFA) 110 MCG/ACT inhaler, Inhale 2 puffs into the lungs 2 times daily, Disp: 1 Inhaler, Rfl: 12     fluticasone (FLONASE) 50 MCG/ACT nasal spray, 2 sprays by Both Nostrils route daily., Disp: , Rfl:     stool softener (docusate)     Allergies:   Morphine hcl   Seasonal allergies      Past Medical History:  Moderate persistent asthma with exacerbation  Degenerative tear of meniscus   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     Vitals:   /83 (BP Location: Right arm, Patient Position: Sitting, Cuff Size: Adult Large)  Pulse 107  Resp 18  Wt 98.4 kg (217 lb)  BMI 33.49 kg/m2      Assessment and Plan:  1. Benign prostatic hyperplasia with lower urinary tract symptoms andpost-void dribbling  Patient has been experiencing increased urinary urgency with decreased flow and dribbling after urination. He is currently on vesicare, though not experiencing great relief of his symptoms. I considered prescribing the patient Proscar for further management. Since the patient has been dealing with these symptoms for over a year now, a urology referral was placed for further consideration and recommendations.   - UROLOGY ADULT REFERRAL    2. Recent sadness  Patient has been dealing with this sadness by avoiding eating and going to bed some night without dinner, resulting in weight loss. He would like to explore hypnosis to deal with his sadness. As I suspect that the patient may be depressed, I recommended starting the patient on a low dose depression medication or having him consult a talk psychologist. He deferred at this time as he is hoping to have a potential partner move in with him who he believes will be beneficial for his mood. If the patient does not see improvement after his friend moves in with him, he should contact the clinic for further recommendations.     3. Constipation   Patient should continue taking his Metamucil daily, but increase to 3 times daily. He can also use a stool softener for further relief. I recommended that the patient increase his fiber and water intake, as well as increase his exercise level. I recommended looking into cooking classes as he does not have motivation to be eating fruits and vegetables  at home. I encouraged possibly looking to signing up for a cooking class. We may consider Miralax in the future if the patient does not experience relief with the suggested interventions.     We will not complete any further blood testing or imaging at this time as the patient just had an extensive workup in the emergency department last week.        Follow-up: The patient expresses that he would like to follow up on a p.r.n. basis. We agreed that if he has not experienced improvement of his sadness in 30 days, his son will reach out to my via My Chart for future arrangements and interventions.          Scribe Disclosure:  I, Dagmar Evans, am serving as a scribe to document services personally performed by Denise Russ MD at this visit, based upon the provider's statements to me. All documentation has been reviewed by the aforementioned provider prior to being entered into the official medical record.     Portions of this medical record were completed by a scribe. UPON MY REVIEW AND AUTHENTICATION BY ELECTRONIC SIGNATURE, this confirms (a) I performed the applicable clinical services, and (b) the record is accurate.     I spent a total of 25 minutes face-to-face with Rolando Gonzalez during today's office visit.  Over 50% of this time was spent counseling the patient and/or coordinating care regarding constipation,LUTS.  See note for details.

## 2018-03-28 ENCOUNTER — PRE VISIT (OUTPATIENT)
Dept: UROLOGY | Facility: CLINIC | Age: 78
End: 2018-03-28

## 2018-03-28 NOTE — TELEPHONE ENCOUNTER
RECORDS RECEIVED FROM: IN EPIC   DATE RECEIVED: IN EPIC   NOTES STATUS DETAILS   OFFICE NOTE from referring provider {Records Status:[id number]    OFFICE NOTES from other specialists {Records Status:[id number]    DISCHARGE SUMMARY from hospital {Records Status:[id number]    DISCHARGE REPORT from the ER {Records Status:[id number]    OPERATIVE REPORT {Records Status:[id number]    MEDICATION LIST {Records Status:[id number]    LABS     URINALYSIS (UA) {Records Status:[id number]    URINE CYTOLOGY {Records Status:[id number]    DIAGNOSIS SPECIFIC LABS     OTHER: BPH {Records Status:[id number] RECORDS IN EPIC

## 2018-03-29 ENCOUNTER — PRE VISIT (OUTPATIENT)
Dept: UROLOGY | Facility: CLINIC | Age: 78
End: 2018-03-29

## 2018-04-30 DIAGNOSIS — E78.5 HYPERLIPEMIA: ICD-10-CM

## 2018-04-30 RX ORDER — SIMVASTATIN 20 MG
20 TABLET ORAL AT BEDTIME
Qty: 90 TABLET | Refills: 0 | Status: SHIPPED | OUTPATIENT
Start: 2018-04-30 | End: 2018-08-09

## 2018-04-30 NOTE — TELEPHONE ENCOUNTER
Last Clinic Visit: 3/27/2018 [facility]    Lab due this month- 90 day patricia refill and FYI to clinic nurse.

## 2018-05-09 DIAGNOSIS — I10 BENIGN ESSENTIAL HYPERTENSION: Primary | ICD-10-CM

## 2018-05-10 NOTE — TELEPHONE ENCOUNTER
allopurinol (ZYLOPRIM) 300 MG     Last Written Prescription Date:  4/28/17  Last Fill Quantity: 90,   # refills: 3  Last Office Visit : 3/27/18  Future Office visit:  NONE    Routing refill request to provider for review/approval because: Protocol Failed   LABS

## 2018-05-11 RX ORDER — ALLOPURINOL 300 MG/1
300 TABLET ORAL DAILY
Qty: 30 TABLET | Refills: 0 | Status: SHIPPED | OUTPATIENT
Start: 2018-05-11 | End: 2018-06-22

## 2018-05-31 NOTE — TELEPHONE ENCOUNTER
celecoxib (CELEBREX) 200 MG capsule      Last Written Prescription Date:  5/3/17  Last Fill Quantity: 90,   # refills: 3  Last Office Visit : 3/27/18  Future Office visit:  None    Routing because: creat   age

## 2018-06-04 RX ORDER — CELECOXIB 200 MG/1
200 CAPSULE ORAL DAILY
Qty: 90 CAPSULE | Refills: 3 | Status: SHIPPED | OUTPATIENT
Start: 2018-06-04 | End: 2019-06-13

## 2018-06-22 ENCOUNTER — TRANSFERRED RECORDS (OUTPATIENT)
Dept: HEALTH INFORMATION MANAGEMENT | Facility: CLINIC | Age: 78
End: 2018-06-22

## 2018-06-22 DIAGNOSIS — I10 BENIGN ESSENTIAL HYPERTENSION: ICD-10-CM

## 2018-06-23 NOTE — TELEPHONE ENCOUNTER
allopurinol (ZYLOPRIM) 300 MG tablet   Last Written Prescription Date:  5/11/18  Last Fill Quantity: 30,   # refills: 0  Last Office Visit :3/27/18  Future Office visit:  none    Routing  Because:  Labs    Please add a uric acid, creatinine to labs and RTC soon!   Denise Russ   (Routing comment    Appt at 1pm with Dr Russ June 26/2018

## 2018-06-25 RX ORDER — ALLOPURINOL 300 MG/1
300 TABLET ORAL DAILY
Qty: 90 TABLET | Refills: 0 | Status: SHIPPED | OUTPATIENT
Start: 2018-06-25 | End: 2018-09-10

## 2018-06-26 ENCOUNTER — PRE VISIT (OUTPATIENT)
Dept: UROLOGY | Facility: CLINIC | Age: 78
End: 2018-06-26

## 2018-06-26 ENCOUNTER — OFFICE VISIT (OUTPATIENT)
Dept: INTERNAL MEDICINE | Facility: CLINIC | Age: 78
End: 2018-06-26
Payer: COMMERCIAL

## 2018-06-26 ENCOUNTER — TELEPHONE (OUTPATIENT)
Dept: UROLOGY | Facility: CLINIC | Age: 78
End: 2018-06-26

## 2018-06-26 VITALS
SYSTOLIC BLOOD PRESSURE: 121 MMHG | TEMPERATURE: 97.6 F | DIASTOLIC BLOOD PRESSURE: 73 MMHG | WEIGHT: 210.8 LBS | RESPIRATION RATE: 20 BRPM | BODY MASS INDEX: 32.53 KG/M2 | HEART RATE: 89 BPM | OXYGEN SATURATION: 95 %

## 2018-06-26 DIAGNOSIS — Z79.4 TYPE 2 DIABETES MELLITUS WITH DIABETIC POLYNEUROPATHY, WITH LONG-TERM CURRENT USE OF INSULIN (H): ICD-10-CM

## 2018-06-26 DIAGNOSIS — Z12.11 SPECIAL SCREENING FOR MALIGNANT NEOPLASMS, COLON: ICD-10-CM

## 2018-06-26 DIAGNOSIS — Z12.11 SPECIAL SCREENING FOR MALIGNANT NEOPLASMS, COLON: Primary | ICD-10-CM

## 2018-06-26 DIAGNOSIS — E11.42 TYPE 2 DIABETES MELLITUS WITH DIABETIC POLYNEUROPATHY, WITH LONG-TERM CURRENT USE OF INSULIN (H): ICD-10-CM

## 2018-06-26 DIAGNOSIS — R30.0 DYSURIA: ICD-10-CM

## 2018-06-26 DIAGNOSIS — I10 BENIGN ESSENTIAL HYPERTENSION: ICD-10-CM

## 2018-06-26 DIAGNOSIS — Z23 NEED FOR VACCINATION: ICD-10-CM

## 2018-06-26 LAB
CHOLEST SERPL-MCNC: 107 MG/DL
CREAT SERPL-MCNC: 1.51 MG/DL (ref 0.66–1.25)
GFR SERPL CREATININE-BSD FRML MDRD: 45 ML/MIN/1.7M2
HDLC SERPL-MCNC: 27 MG/DL
LDLC SERPL CALC-MCNC: 26 MG/DL
NONHDLC SERPL-MCNC: 80 MG/DL
TRIGL SERPL-MCNC: 269 MG/DL
URATE SERPL-MCNC: 5.3 MG/DL (ref 3.5–7.2)

## 2018-06-26 ASSESSMENT — PAIN SCALES - GENERAL: PAINLEVEL: NO PAIN (0)

## 2018-06-26 NOTE — PATIENT INSTRUCTIONS
[facility] Medication Refill Request Information:  * Please contact your pharmacy regarding ANY request for medication refills.  ** [facility] Prescription Fax = [phone]  * Please allow 3 business days for routine medication refills.  * Please allow 5 business days for controlled substance medication refills.     [facility] Test Result notification information:  *You will be notified with in 7-10 days of your appointment day regarding the results of your test.  If you are on MyChart you will be notified as soon as the provider has reviewed the results and signed off on them.    [facility] [phone]     NEEDS FOLLOW UP APPOINTMENT WITH UROLOGY      Urology [phone]

## 2018-06-26 NOTE — NURSING NOTE
Chief Complaint   Patient presents with     Dysuria     Patient is here for UTI; dysuria.      Diabetes     Patient is also here for diabetes follow up       Len Jorgensen CMA ([facility]) at 1:08 PM on 6/26/2018

## 2018-06-26 NOTE — TELEPHONE ENCOUNTER
Patient called and told that we will move him up to July 3rd with dr lentz  His symptoms are a lot worse. Inés Jimenez, [facility] Staff Nurse

## 2018-06-26 NOTE — MR AVS SNAPSHOT
After Visit Summary   6/26/2018    Rolando Gonzalez    MRN: [id number]           Patient Information     Date Of Birth          1940        Visit Information        Provider Department      6/26/2018 1:00 PM Denise Russ MD [facility]        Today's Diagnoses     Special screening for malignant neoplasms, colon    -  1    Type 2 diabetes mellitus with diabetic polyneuropathy, with long-term current use of insulin (H)        Dysuria        Need for vaccination          Care Instructions    Primary Care Center Medication Refill Request Information:  * Please contact your pharmacy regarding ANY request for medication refills.  ** [facility] Prescription Fax = [phone]  * Please allow 3 business days for routine medication refills.  * Please allow 5 business days for controlled substance medication refills.     Primary Care Center Test Result notification information:  *You will be notified with in 7-10 days of your appointment day regarding the results of your test.  If you are on MyChart you will be notified as soon as the provider has reviewed the results and signed off on them.    [facility] [phone]     NEEDS FOLLOW UP APPOINTMENT WITH UROLOGY      Urology [phone]             Follow-ups after your visit        Follow-up notes from your care team     Return in about 6 months (around 12/26/2018) for Physical Exam.      Your next 10 appointments already scheduled     Jul 27, 2018 10:45 AM CDT   (Arrive by 10:30 AM)   NEW BENIGN PROSTATIC HYPERTROPHY with Dasha Jain MD   [facility] ([facility])    [address] [place] [address]  4th [facility] [id number] [phone]            Dec 17, 2018 10:25 AM CST   (Arrive by 10:10 AM)   Return Visit with Denise Russ MD   [facility] ([facility])    [address] [place] [address]  [facility] [address] [phone]              Future tests that were ordered for you today     Open Future Orders        Priority Expected Expires Ordered    Hemoglobin A1c **(6 MO) Routine 12/23/2018 6/26/2019 6/26/2018    Lipid Profile FASTING Routine 6/26/2018 6/26/2019 6/26/2018    UA with Micro reflex to Culture Routine 6/26/2018 6/26/2019 6/26/2018    Fecal cancer screen FIT Routine 6/26/2018 9/24/2018 6/26/2018            Who to contact     Please call your clinic at [phone] to:    Ask questions about your health    Make or cancel appointments    Discuss your medicines    Learn about your test results    Speak to your doctor            Additional Information About Your Visit        [employer] Information     [employer] gives you secure access to your electronic health record. If you see a primary care provider, you can also send messages to your care team and make appointments. If you have questions, please call your primary care clinic.  If you do not have a primary care provider, please call [phone] and they will assist you.      [employer] is an electronic gateway that provides easy, online access to your medical records. With [employer], you can request a clinic appointment, read your test results, renew a prescription or communicate with your care team.     To access your existing account, please contact your [facility] or call [phone] for assistance.        Care EveryWhere ID     This is your Care EveryWhere ID. This could be used by other organizations to access your [place] medical records  [id number]        Your Vitals Were     Pulse Temperature Respirations Pulse Oximetry BMI (Body Mass Index)       89 97.6  F (36.4  C) (Oral) 20 95% 32.53 kg/m2        Blood Pressure from Last 3 Encounters:   06/26/18 121/73   03/27/18 145/83   03/22/18 124/68    Weight from Last 3 Encounters:   06/26/18 95.6 kg (210 lb 12.8 oz)   03/27/18 98.4 kg (217 lb)    03/22/18 95.3 kg (210 lb)              We Performed the Following     ADMIN MEDICARE: Pneumococcal Vaccine ()     Pneumococcal vaccine 23 valent PPSV23  (Pneumovax) [18286]          Today's Medication Changes          These changes are accurate as of 6/26/18  2:03 PM.  If you have any questions, ask your nurse or doctor.               Stop taking these medicines if you haven't already. Please contact your care team if you have questions.     HYDROcodone-acetaminophen 5-325 MG per tablet   Commonly known as:  NORCO   Stopped by:  Denise Russ MD                    Primary Care Provider Office Phone # Fax #    Denise Russ [phone] [address] [address]        Equal Access to Services     [facility]: Hadii aad ku hadasho Soomaali, waaxda luqadaha, qaybta kaalmada adeegyada, yan west . So [facility] [phone].    ATENCIÓN: Si habla español, tiene a redman disposición servicios gratuitos de asistencia lingüística. [facility] [phone].    We comply with applicable federal civil rights laws and [place] laws. We do not discriminate on the basis of race, color, national origin, age, disability, sex, sexual orientation, or gender identity.            Thank you!     Thank you for choosing [facility]  for your care. Our goal is always to provide you with excellent care. Hearing back from our patients is one way we can continue to improve our services. Please take a few minutes to complete the written survey that you may receive in the mail after your visit with us. Thank you!             Your Updated Medication List - Protect others around you: Learn how to safely use, store and throw away your medicines at www.disposemymeds.org.          This list is accurate as of 6/26/18  2:03 PM.  Always use your most recent med list.                   Brand Name Dispense Instructions for use Diagnosis    albuterol 108 (90  Base) MCG/ACT Inhaler    PROAIR HFA/PROVENTIL HFA/VENTOLIN HFA     Inhale 2 puffs into the lungs every 6 hours        allopurinol 300 MG tablet    ZYLOPRIM    90 tablet    Take 1 tablet (300 mg) by mouth daily    Benign essential hypertension       amLODIPine 10 MG tablet    NORVASC    90 tablet    Take 1 tablet (10 mg) by mouth daily    Essential hypertension with goal blood pressure less than 130/80       AMOXICILLIN PO      Take 1,000 mg by mouth daily        aspirin 81 MG tablet      Take by mouth daily    Hearing loss, unspecified laterality, Fatigue, Diabetes mellitus type 2, insulin dependent (H), Hyperlipidemia, COPD (chronic obstructive pulmonary disease) (H)       BIOTENE DRY MOUTH Pste      Apply 1 spray to affected area        blood glucose monitoring test strip    ACCU-CHEK MERY    200 each    Test 2 times daily    Diabetes mellitus (H)       budesonide-formoterol 80-4.5 MCG/ACT Inhaler    SYMBICORT    1 Inhaler    Inhale 2 puffs into the lungs 2 times daily    Moderate persistent asthma without complication       camphor-menthol 0.5-0.5 % Lotn    DERMASARRA    1 Bottle    Applied to skin 3 times day p.r.n. for itching    Itching       celecoxib 200 MG capsule    celeBREX    90 capsule    Take 1 capsule (200 mg) by mouth daily    Diabetes mellitus type 2, uncontrolled (H)       chlorthalidone 25 MG tablet    HYGROTON    45 tablet    Take one half tablet every morning.  Discontinue the hydrochlorothiazide 12.5 mg    Edema, unspecified type       cyclobenzaprine 5 MG tablet    FLEXERIL    42 tablet    Take one or two daily as needed for severe neck neck pain.    Neck pain       fluticasone 110 MCG/ACT Inhaler    FLOVENT HFA    1 Inhaler    Inhale 2 puffs into the lungs 2 times daily    Asthma       fluticasone 50 MCG/ACT spray    FLONASE     2 sprays by Both Nostrils route daily.    Diabetes mellitus type 2, uncontrolled (H), Hyperlipidemia       fluticasone-salmeterol 500-50 MCG/DOSE diskus inhaler     ADVAIR     Inhale 1 puff into the lungs every 12 hours        gabapentin 300 MG capsule    NEURONTIN    360 capsule    Take 2 capsules (600 mg) by mouth 2 times daily    Diabetic nephropathy (H)       glipiZIDE 10 MG tablet    GLUCOTROL    180 tablet    Take 1 tablet (10 mg) by mouth 2 times daily    Diabetes mellitus type 2, uncontrolled (H)       indomethacin 50 MG capsule    INDOCIN    30 capsule    Take 1 capsule (50 mg) by mouth 3 times daily as needed Use only during a gout attack    Gout, unspecified       insulin glargine 100 UNIT/ML injection    LANTUS SOLOSTAR    60 mL    Inject 30 Units Subcutaneous 2 times daily    Diabetes mellitus type 2, uncontrolled (H)       insulin pen needle 31G X 8 MM    B-D U/F    200 each    Use twice daily or as directed. *3 month supply    Diabetes mellitus type 2, uncontrolled (H)       losartan 100 MG tablet    COZAAR    90 tablet    Take 1 tablet (100 mg) by mouth daily    Essential hypertension with goal blood pressure less than 130/80       metFORMIN modified 500 MG 24 hr tablet    GLUMETZA    450 tablet    Take 2 tablets every morning, 1 tablet at noon, and 2 tablets at bedtime.    Diabetes mellitus type 2, insulin dependent (H)       omeprazole 40 MG capsule    priLOSEC    180 capsule    Take 1 capsule (40 mg) by mouth 2 times daily    Esophageal reflux       saw palmetto 160 MG Caps      Take 160 mg by mouth daily        simethicone 125 MG Chew chewable tablet    MYLICON    30 tablet    Take 1 tablet (125 mg) by mouth 4 times daily as needed for intestinal gas        simvastatin 20 MG tablet    ZOCOR    90 tablet    Take 1 tablet (20 mg) by mouth At Bedtime    Hyperlipemia       solifenacin 5 MG tablet    VESICARE    90 tablet    Take 1 tablet (5 mg) by mouth daily    Urgency incontinence       tiotropium 18 MCG capsule    SPIRIVA HANDIHALER    30 capsule    Inhale contents of one capsule daily.    Intermittent asthma       Vitamin D-3 1000 units Caps      Take 1,000  Units by mouth daily

## 2018-06-26 NOTE — PROGRESS NOTES
[facility]  Primary Care Center   Denise Russ MD  06/26/2018      Chief Complaint:   Dysuria and Diabetes       History of Present Illness:   Rolando Gonzalez is a 77 year old male with a history of hypertension, hyperlipidemia, type 2 diabetes mellitus, major depression, and asthma, who presents with his son for evaluation of dysuria and diabetes. Dysuria began a month ago with pain during urination, no pus or other drainage. Frequency also increased.. He missed his urology appointment last month and has not rescheduled.    DM2:Before coming to this appointment his blood sugar was 190, he ate a burger and gummy candy for lunch. He continues with lantus, metformin, and glipizide.     Asthma:  Per patient his lungs are in good health. He had a pulmonary function test on 11/28/2017 showing mild restriction. For exercise he walks around his house and walks up and down steps. Exercise is well tolerated with no chest or leg pain.     Grief:  For depression he has been managing by working on his thinking patterns. When he misses his wife he tries to get his mind to a different subject. He does not believe a talk psychologist would help him.     The patient and his friend, Naseem, both think his short term memory performance has decreased.     Healthcare/Maintenance topics discussed:  1.colonoscopies have not yielded results in the past, he was recommended to have a barium enema. He agreed to a FIT test and any necessary     Review of Systems:   Pertinent items are noted in HPI, remainder of complete ROS is negative.      Active Medications:     Current Outpatient Prescriptions:      albuterol (PROAIR HFA, PROVENTIL HFA, VENTOLIN HFA) 108 (90 BASE) MCG/ACT inhaler, Inhale 2 puffs into the lungs every 6 hours, Disp: , Rfl:      allopurinol (ZYLOPRIM) 300 MG tablet, Take 1 tablet (300 mg) by mouth daily, Disp: 90 tablet, Rfl: 0     amLODIPine (NORVASC) 10 MG tablet, Take 1 tablet (10 mg) by mouth daily, Disp: 90 tablet,  Rfl: 3     AMOXICILLIN PO, Take 1,000 mg by mouth daily, Disp: , Rfl:      aspirin 81 MG tablet, Take by mouth daily, Disp: , Rfl:      budesonide-formoterol (SYMBICORT) 80-4.5 MCG/ACT Inhaler, Inhale 2 puffs into the lungs 2 times daily, Disp: 1 Inhaler, Rfl: 1     camphor-menthol (DERMASARRA) 0.5-0.5 % LOTN, Applied to skin 3 times day p.r.n. for itching, Disp: 1 Bottle, Rfl: 3     celecoxib (CELEBREX) 200 MG capsule, Take 1 capsule (200 mg) by mouth daily, Disp: 90 capsule, Rfl: 3     chlorthalidone (HYGROTON) 25 MG tablet, Take one half tablet every morning.  Discontinue the hydrochlorothiazide 12.5 mg, Disp: 45 tablet, Rfl: 3     Cholecalciferol (VITAMIN D-3) 1000 UNITS CAPS, Take 1,000 Units by mouth daily, Disp: , Rfl:      cyclobenzaprine (FLEXERIL) 5 MG tablet, Take one or two daily as needed for severe neck neck pain., Disp: 42 tablet, Rfl: 0     Dentifrices (BIOTENE DRY MOUTH) PSTE, Apply 1 spray to affected area, Disp: , Rfl:      fluticasone (FLONASE) 50 MCG/ACT nasal spray, 2 sprays by Both Nostrils route daily., Disp: , Rfl:      fluticasone (FLOVENT HFA) 110 MCG/ACT inhaler, Inhale 2 puffs into the lungs 2 times daily, Disp: 1 Inhaler, Rfl: 12     fluticasone-salmeterol (ADVAIR) 500-50 MCG/DOSE diskus inhaler, Inhale 1 puff into the lungs every 12 hours, Disp: , Rfl:      gabapentin (NEURONTIN) 300 MG capsule, Take 2 capsules (600 mg) by mouth 2 times daily, Disp: 360 capsule, Rfl: 3     glipiZIDE (GLUCOTROL) 10 MG tablet, Take 1 tablet (10 mg) by mouth 2 times daily, Disp: 180 tablet, Rfl: 1     indomethacin (INDOCIN) 50 MG capsule, Take 1 capsule (50 mg) by mouth 3 times daily as needed Use only during a gout attack, Disp: 30 capsule, Rfl: 0     insulin glargine (LANTUS SOLOSTAR) 100 UNIT/ML injection, Inject 30 Units Subcutaneous 2 times daily, Disp: 60 mL, Rfl: 1     losartan (COZAAR) 100 MG tablet, Take 1 tablet (100 mg) by mouth daily, Disp: 90 tablet, Rfl: 3     metFORMIN modified (GLUMETZA)  500 MG 24 hr tablet, Take 2 tablets every morning, 1 tablet at noon, and 2 tablets at bedtime., Disp: 450 tablet, Rfl: 3     omeprazole (PRILOSEC) 40 MG capsule, Take 1 capsule (40 mg) by mouth 2 times daily, Disp: 180 capsule, Rfl: 3     saw palmetto 160 MG CAPS, Take 160 mg by mouth daily, Disp: , Rfl:      simethicone (MYLICON) 125 MG CHEW chewable tablet, Take 1 tablet (125 mg) by mouth 4 times daily as needed for intestinal gas, Disp: 30 tablet, Rfl: 0     simvastatin (ZOCOR) 20 MG tablet, Take 1 tablet (20 mg) by mouth At Bedtime, Disp: 90 tablet, Rfl: 0     solifenacin (VESICARE) 5 MG tablet, Take 1 tablet (5 mg) by mouth daily, Disp: 90 tablet, Rfl: 2     tiotropium (SPIRIVA HANDIHALER) 18 MCG inhalation capsule, Inhale contents of one capsule daily., Disp: 30 capsule, Rfl: 1      Allergies:   Morphine hcl and Seasonal allergies      Past Medical History:  Asthma  Degenerative meniscus tear  Type 2 diabetes  Diabetic neuropathy  Diverticulosis  Erectile dysfunction  Edema due to venous insufficiency  GERD (gastroesophageal reflux disease)  Gout  Hyperlipidemia  hypertension  Right shoulder osteoarthritis  RLL lung nodule, stable  Urge incontinence  Vasomotor rhinitis  COPD (chronic obstructive pulmonary disease)  Decreased GFR  Claudication, left leg     Past Surgical History:  Bilateral cataracts  L4-5 fusion    Family History:   Paternal grandmother: cancer     Social History:     Passive smoke exposure    Physical Exam:   /73 (BP Location: Right arm, Patient Position: Chair, Cuff Size: Adult Regular)  Pulse 89  Temp 97.6  F (36.4  C) (Oral)  Resp 20  Wt 95.6 kg (210 lb 12.8 oz)  SpO2 95%  BMI 32.53 kg/m2   Constitutional: Healthy, alert, no distress and cooperative  Head: Normocephalic. No masses, lesions, tenderness or abnormalities  Cardiovascular: JVP 7cm. RRR, S1, 2/6 systolic ejection murmur clicks, rubs, or gallops. No pretibial edema.  No carotid bruits.  Respiratory: Clear to  "auscultation and percussion bilaterally.   Gastrointestinal: BS NA. Soft, nontender, no hepatosplenomegaly or mass. Liver is 7 centimeters at the midclavicular line.  There is no CVA or flank tenderness bilaterally.      Assessment and Plan:  Moderate persistent asthma: Doing well on current medical management.  Followed by allergist.  Just had normal PFTs.    Special screening for malignant neoplasms, colon  Past colonoscopy attempts have not worked: There were 2 attempts at colonoscopy in 2014 need of which could \"get around the bend\\".  It was recommended at that time that he had a air contrast barium enema but he never had it.. I outlined 3 choices for him now: Air-contrast barium enema, for testing or CT colonoscopy.  He chose for testing.  The patient agreed to trying a fecal FIT screen first and following up with a barium enema or CT colonoscopy if necessary.   - Fecal cancer screen FIT    Type 2 diabetes mellitus with diabetic polyneuropathy, with long-term current use of insulin (H)  Well controlled based on his report of fasting and nonfasting glucose 3 last A1c was 5.9.  - Hemoglobin A1c **(6 MO)  - Lipid Profile FASTING    Dysuria   For his dysuria a UA will be checked. If there are signs of infection treatment with antibiotics will be started. If white blood cell counts are within normal limits a decision will be made based on the culture results.  - UA with Micro reflex to Culture    Need for vaccination    - Pneumococcal vaccine 23 valent PPSV23  (Pneumovax) [[id number]]  - ADMIN MEDICARE: Pneumococcal Vaccine ()    Depression/grief reaction  Has been managing on his own by mentally distracting himself when he misses his late wife.        Follow-up: Return in about 6 months (around 12/26/2018) for Physical Exam.         Scribe Disclosure:   Yordy PHELPS, am serving as a scribe to document services personally performed by Denise Russ MD at this visit, based upon the provider's statements " to me. All documentation has been reviewed by the aforementioned provider prior to being entered into the official medical record.     Portions of this medical record were completed by a scribe. UPON MY REVIEW AND AUTHENTICATION BY ELECTRONIC SIGNATURE, this confirms (a) I performed the applicable clinical services, and (b) the record is accurate.     Denise Russ MD

## 2018-06-26 NOTE — NURSING NOTE
Administered Pneumococcal Pneumovax 23 vaccine (see Immunizations in Chart Review). Patient tolerated well.  Len Jorgensen CMA at 2:18 PM on 6/26/2018

## 2018-06-27 ENCOUNTER — TELEPHONE (OUTPATIENT)
Dept: LAB | Facility: CLINIC | Age: 78
End: 2018-06-27

## 2018-06-27 DIAGNOSIS — Z12.11 SPECIAL SCREENING FOR MALIGNANT NEOPLASMS, COLON: ICD-10-CM

## 2018-06-27 DIAGNOSIS — R30.0 DYSURIA: Primary | ICD-10-CM

## 2018-06-27 LAB
ALBUMIN UR-MCNC: NEGATIVE MG/DL
APPEARANCE UR: CLEAR
BILIRUB UR QL STRIP: NEGATIVE
COLOR UR AUTO: YELLOW
GLUCOSE UR STRIP-MCNC: NEGATIVE MG/DL
HGB UR QL STRIP: NEGATIVE
KETONES UR STRIP-MCNC: NEGATIVE MG/DL
LEUKOCYTE ESTERASE UR QL STRIP: NEGATIVE
NITRATE UR QL: NEGATIVE
PH UR STRIP: 5.5 PH (ref 5–7)
RBC #/AREA URNS AUTO: NORMAL /HPF
SOURCE: NORMAL
SP GR UR STRIP: 1.02 (ref 1–1.03)
UROBILINOGEN UR STRIP-ACNC: 0.2 EU/DL (ref 0.2–1)
WBC #/AREA URNS AUTO: NORMAL /HPF

## 2018-06-27 PROCEDURE — 82274 ASSAY TEST FOR BLOOD FECAL: CPT | Performed by: INTERNAL MEDICINE

## 2018-06-27 PROCEDURE — 81001 URINALYSIS AUTO W/SCOPE: CPT | Performed by: INTERNAL MEDICINE

## 2018-06-27 ASSESSMENT — PATIENT HEALTH QUESTIONNAIRE - PHQ9: SUM OF ALL RESPONSES TO PHQ QUESTIONS 1-9: 7

## 2018-06-29 LAB — HEMOCCULT STL QL IA: NEGATIVE

## 2018-07-03 ENCOUNTER — OFFICE VISIT (OUTPATIENT)
Dept: UROLOGY | Facility: CLINIC | Age: 78
End: 2018-07-03
Payer: COMMERCIAL

## 2018-07-03 VITALS
BODY MASS INDEX: 32.41 KG/M2 | RESPIRATION RATE: 18 BRPM | WEIGHT: 210 LBS | HEART RATE: 80 BPM | DIASTOLIC BLOOD PRESSURE: 81 MMHG | SYSTOLIC BLOOD PRESSURE: 134 MMHG

## 2018-07-03 DIAGNOSIS — N40.1 BENIGN PROSTATIC HYPERPLASIA WITH NOCTURIA: Primary | ICD-10-CM

## 2018-07-03 DIAGNOSIS — R35.1 BENIGN PROSTATIC HYPERPLASIA WITH NOCTURIA: Primary | ICD-10-CM

## 2018-07-03 RX ORDER — TAMSULOSIN HYDROCHLORIDE 0.4 MG/1
0.4 CAPSULE ORAL DAILY
Qty: 30 CAPSULE | Refills: 3 | Status: SHIPPED | OUTPATIENT
Start: 2018-07-03 | End: 2018-09-04

## 2018-07-03 ASSESSMENT — ENCOUNTER SYMPTOMS
POSTURAL DYSPNEA: 0
DYSPNEA ON EXERTION: 1
SHORTNESS OF BREATH: 1
DIFFICULTY URINATING: 1
SNORES LOUDLY: 1
FLANK PAIN: 0
HEMATURIA: 0
HEMOPTYSIS: 0
DYSURIA: 0
COUGH: 1
WHEEZING: 1
SPUTUM PRODUCTION: 0
COUGH DISTURBING SLEEP: 0

## 2018-07-03 ASSESSMENT — PAIN SCALES - GENERAL: PAINLEVEL: NO PAIN (0)

## 2018-07-03 NOTE — PROGRESS NOTES
Chief Complaint:   Benign Prostatic Hypertrophy         History of Present Illness:    Rolando Gonzalez is a very pleasant 77 year old male who presents with a history of DM2 with neuropathy, gout, GERD, HTN, asthma, and BPH. For the past 3 years he has been having urinary symptoms including frequency (every 1-2 hours), urgency, urge incontinence (once or twice a week), nocturia (3-4 times a night), and post void dribbling. Patient also feels like he is emptying poorly.  He has been prescribed Vesicare for overactive bladder symptoms by another provider and had an increase in dose last week which has only helped minimally. He has never been on any other medications for these problems and has never had a full work up from a Urologist.    Denies family history of prostate cancer.  Denies history of hematuria. He was having dysuria several weeks ago and a urine was sent to rule out infection which was negative.    Also had an episode of left flank pain 3/18 which was evaluated with CT imaging.  CT identified a somewhat anatomically atypical right kidney with parenchymal calcification but no stones or renal pathology bilaterally.         Past Medical History:     Past Medical History:   Diagnosis Date     Asthma, moderate persistent      Degenerative tear of meniscus      Diabetes mellitus type 2, insulin dependent (H)      Diabetic neuropathy (H)     improved with gabapentin; normal EMG 1/26/18     Diverticulosis      ED (erectile dysfunction)      Edema extremities 2/15/16    venous insufficiency     GERD (gastroesophageal reflux disease)      Gout      Hyperlipidemia      Hypertension      Moderate persistent asthma 2017    MILLI Álvarez MD allergist.  FEV1 1.83. FEF 25-75 1.5 (3/13/17)     Osteoarthritis, shoulder     right     RLL lung nodule 03/20/2017    stable 8 mm rll nodule.  No additional CT testing needed.       Urge incontinence      Vasomotor rhinitis             Past Surgical History:     Past Surgical  History:   Procedure Laterality Date     CATARACT IOL, RT/LT      both eyes     L4-5 fusion              Medications     Current Outpatient Prescriptions   Medication     albuterol (PROAIR HFA, PROVENTIL HFA, VENTOLIN HFA) 108 (90 BASE) MCG/ACT inhaler     allopurinol (ZYLOPRIM) 300 MG tablet     amLODIPine (NORVASC) 10 MG tablet     AMOXICILLIN PO     aspirin 81 MG tablet     blood glucose monitoring (ACCU-CHEK MERY) test strip     budesonide-formoterol (SYMBICORT) 80-4.5 MCG/ACT Inhaler     camphor-menthol (DERMASARRA) 0.5-0.5 % LOTN     celecoxib (CELEBREX) 200 MG capsule     chlorthalidone (HYGROTON) 25 MG tablet     Cholecalciferol (VITAMIN D-3) 1000 UNITS CAPS     cyclobenzaprine (FLEXERIL) 5 MG tablet     Dentifrices (BIOTENE DRY MOUTH) PSTE     fluticasone (FLONASE) 50 MCG/ACT nasal spray     fluticasone (FLOVENT HFA) 110 MCG/ACT inhaler     fluticasone-salmeterol (ADVAIR) 500-50 MCG/DOSE diskus inhaler     gabapentin (NEURONTIN) 300 MG capsule     glipiZIDE (GLUCOTROL) 10 MG tablet     indomethacin (INDOCIN) 50 MG capsule     insulin glargine (LANTUS SOLOSTAR) 100 UNIT/ML injection     insulin pen needle (B-D U/F) 31G X 8 MM     losartan (COZAAR) 100 MG tablet     metFORMIN modified (GLUMETZA) 500 MG 24 hr tablet     omeprazole (PRILOSEC) 40 MG capsule     saw palmetto 160 MG CAPS     simethicone (MYLICON) 125 MG CHEW chewable tablet     simvastatin (ZOCOR) 20 MG tablet     solifenacin (VESICARE) 5 MG tablet     tiotropium (SPIRIVA HANDIHALER) 18 MCG inhalation capsule     No current facility-administered medications for this visit.             Family History:     Family History   Problem Relation Age of Onset     Cancer Paternal Grandmother             Social History:     Social History     Social History     Marital status: Single     Spouse name: N/A     Number of children: N/A     Years of education: N/A     Occupational History     [employer] business owner Retired     Social History Main Topics      Smoking status: Passive Smoke Exposure - Never Smoker     Last attempt to quit: 2/10/1982     Smokeless tobacco: Never Used     Alcohol use 0.5 oz/week     1 drink(s) per week     Drug use: No     Sexual activity: Yes     Partners: Female     Other Topics Concern     Exercise Yes     walks     Social History Narrative            Allergies:   Morphine hcl and Seasonal allergies         Review of Systems:  From intake questionnaire   Negative 14 system review except as noted on HPI, nurse's note.         Physical Exam:   Patient is a 77 year old  male   Vitals: There were no vitals taken for this visit.  General Appearance Adult: Alert, no acute distress, oriented  HENT: throat/mouth:normal, good dentition  Neck: No adenopathy,masses or thyromegaly  Lungs: no respiratory distress, or pursed lip breathing  Heart: No obvious jugular venous distension present  Abdomen: soft, nontender, no organomegaly or masses, protruberant abdominal midsection There is no height or weight on file to calculate BMI.  Lymphatics: No cervical or supraclavicular adenopathy  Musculoskeltal: extremities normal, no peripheral edema  Skin: no suspicious lesions or rashes  Neuro: Alert, oriented, speech and mentation normal  Psych: affect and mood normal  Gait: Normal    Post void residual urine volume by US 64 mL      Labs and Pathology:    I personally reviewed all applicable laboratory data and went over findings with patient  Significant for:    CBC RESULTS:  Recent Labs   Lab Test  03/22/18   1229  12/20/16   1750  05/31/16   1906  03/10/15   1103  07/21/14   1102   WBC  11.8*   --   11.2*  14.6*  12.9*   HGB  13.0*  14.0  13.3  13.5  15.1   PLT  193   --   211  224  250        BMP RESULTS:  Recent Labs   Lab Test  06/26/18   1427  03/22/18   1229  11/07/17   1718  08/22/16   1633  05/31/16   1906   NA   --   142  138  140  136   POTASSIUM   --   3.6  3.9  4.4  4.5   CHLORIDE   --   111*  108  109  104   CO2   --   20 21  22  25   ANIONGAP    --   12  8  10  7   GLC   --   115*  161*  175*  306*   BUN   --   30  25  32*  22   CR  1.51*  1.66*  1.79*  1.69*  1.60*   GFRESTIMATED  45*  40*  37*  40*  42*   GFRESTBLACK  54*  49*  45*  48*  51*   BRITTANY   --   8.7  8.4*  9.1  9.4       UA RESULTS:   Recent Labs   Lab Test  06/27/18   0630  03/22/18   1208  11/07/17   1600   SG  1.020  1.015  1.020   URINEPH  5.5  5.5  5.0   NITRITE  Negative  Negative  Negative   RBCU  O - 2  <1  0   WBCU  0 - 5  3  2       CALCIUM RESULTS  Lab Results   Component Value Date    BRITTANY 8.7 03/22/2018    BRITTANY 8.4 11/07/2017    BRITTANY 9.1 08/22/2016       PSA RESULTS  PSA   Date Value Ref Range Status   11/07/2017 2.97 0 - 4 ug/L Final     Comment:     Assay Method:  Chemiluminescence using Siemens Vista analyzer   05/31/2016 3.46 0 - 4 ug/L Final   02/24/2010 1.78 0 - 4 ug/L Final   12/19/2007 1.34 0 - 4 ug/L Final   03/27/2006 1.89 0 - 4 ug/L Final       INR  Recent Labs   Lab Test  03/22/18   1229   INR  1.05           Imaging:    I personally reviewed all applicable imaging and went over findings with patient.  Significant for:    Results for orders placed or performed during the hospital encounter of 03/22/18   POC US RETROPERITONEAL LIMITED    Impression    Limited Bedside Renal Ultrasound, performed and interpreted by me.    Indication: Flank pain  Images of the the right and left kidney were acquired in short and long axis, evaluating for hydronephrosis. A short and long axis of the bladder was evaluated for bladder stones. Image quality was satisfactory..     Findings:  No evidence of hydronephrosis in bilateral kidneys.    No urinary bladder stones seen.         IMPRESSION: No evidence of hydronephrosis or stones. But right kidney small.    [facility] Procedure Note      Limited Bedside ED Aorta Ultrasound:    PROCEDURE: PERFORMED BY: Dr. Bandar Santoro MD  INDICATIONS:  Flank pain    PROBE:  Low frequency convex probe  BODY LOCATION: Abdomen  FINDINGS:  The ultrasound  was performed using longitudinal and transverse views.   Normal: Abdominal aorta < 4 cm.  MEASUREMENT:  1.9 cm   No evidence of free fluid in hepatorenal (Morison s pouch), perisplenic, or and pelvic areas. No evidence of pericardial effusion.  INTERPRETATION:  The evaluation of the aorta was of normal caliber (ie < 4cm in the transverse/longitudinal views) without evidence of aneurysm or dilation.  Aorta visualized in entirety from insertion into the intra-abdominal cavity to bifurcation into iliac vessels. There was no abdominal free fluid.  IMAGE DOCUMENTATION: Images were archived to PACs system.     CT Abdomen Pelvis w/o Contrast    Narrative    EXAMINATION: CT ABDOMEN PELVIS W/O CONTRAST, 3/22/2018 1:59 PM    TECHNIQUE:  Helical CT images from the lung bases through the  symphysis pubis were obtained without IV contrast.    COMPARISON: CT abdomen/pelvis 8/21/2009    HISTORY: left flank pain;     FINDINGS:    Abdomen and pelvis: The right kidney is abnormal in axis of rotation  and is positioned lower in the pelvis than is typical. The right renal  artery arises from the distal abdominal aorta. There are at least 2  right renal veins, one of the transiting to the IVC in one of the  drains into the left common iliac vein. The left kidney is normal in  axis and position.    Calcification in the renal sinus of the interpolar region of the right  kidney (series 5 image 252) is slightly increased in size since CT  8/21/2009, favored to represent vascular calcification. No  urolithiasis. Mildly lobulated contour to both kidneys is unchanged  since 2009. No hydronephrosis. Urinary bladder is normal. Prostate  gland is enlarged measuring 4.4 x 4.3 x 5.2 cm.    The liver is enlarged measuring 21.1 cm in length, not significantly  changed since 2009. Otherwise, noncontrast appearance to the liver,  spleen, adrenals, and pancreas is normal. Gallbladder is not  visualized, similar to prior exam, possibly surgically absent.  No  dilated small or large bowel. Diverticulosis without evidence of  diverticulitis. Unchanged thickening along the left  retroperitoneum/lateral coronal fascia. Normal appendix. No  intraperitoneal free fluid, free air, pneumatosis, or portal venous  gas. No abdominal aortic aneurysm. Borderline enlarged dory hepatis  lymph node (series 5 image 106) measures 12 mm, unchanged.    Lung bases: The visualized portions of the lower lungs are clear.  Calcification along the pleural surface of the left hemidiaphragm,  increased since 2009.    Bones and soft tissues: Degenerative changes in the lumbar spine.  Postsurgical changes of anterior fusion of L5-S1. Bilateral pars  interarticularis defects of L5, unchanged. Increased heterogeneous  lucency in the right lateral aspect of the L4 vertebral body, likely  degenerative in etiology. Bone island in the left acetabulum. No acute  or suspicious osseous lesion.      Impression    IMPRESSION:   1. No acute findings in the abdomen/pelvis to explain the patient's  left flank pain. Calcification in the interpolar region of the right  kidney is slightly increased in size since CT 8/21/2009, favored to  represent vascular calcification. No urolithiasis or hydronephrosis.   2. Focally advanced degenerative changes at L5-S1 consisting of  bilateral pars interarticularis defects, severe disc space loss, and  significant bilateral neuroforaminal narrowing. There are also  postsurgical changes of anterior fusion at L5-S1.  3. Increased pleural calcifications over the left hemidiaphragmatic  surface since 2009.  Differential includes the sequela of prior  trauma/hemothorax versus less likely related to asbestos exposure  given lack of pleural calcification seen elsewhere in the chest.   4. Unchanged hepatomegaly since 2009.    I have personally reviewed the examination and initial interpretation  and I agree with the findings.    MARLENE BALTAZAR MD          Standardized  Questionnaire:      [employer] SYMPTOM SCORE  SUM 26/35  QOL 6/6           Assessment and Plan:     Assessment: 77 year old male w/ hx of suspected BPH    Plan:  -Discussed treatment options for BPH including alpha blockers, anticholinergics, MIST and surgical therapy  -Interested in trial of medical management with alpha blockers.  Counseled regarding risks and benefit profile  -Will try flomax, return for potential cystoscopy if symptoms persist to assess for prostatic anatomy and determine eligibility for possible DELCID intervention.  Alternative explanations for urinary symptoms include neuropathy, diabetes    I, Haider Chen saw and evaluated this patient and agree with the plan as stated above.  I personally performed all listed procedures.     CC:  Denise Russ      Answers for HPI/ROS submitted by the patient on 7/3/2018   General Symptoms: No  Skin Symptoms: No  HENT Symptoms: No  EYE SYMPTOMS: No  HEART SYMPTOMS: No  LUNG SYMPTOMS: Yes  INTESTINAL SYMPTOMS: No  URINARY SYMPTOMS: Yes  REPRODUCTIVE SYMPTOMS: Yes  SKELETAL SYMPTOMS: No  BLOOD SYMPTOMS: No  NERVOUS SYSTEM SYMPTOMS: No  MENTAL HEALTH SYMPTOMS: No  Cough: Yes  Sputum or phlegm: No  Coughing up blood: No  Difficulty breating or shortness of breath: Yes  Snoring: Yes  Wheezing: Yes  Difficulty breathing on exertion: Yes  Nighttime Cough: No  Difficulty breathing when lying flat: No  Trouble holding urine or incontinence: Yes  Pain or burning: No  Trouble starting or stopping: Yes  Increased frequency of urination: Yes  Blood in urine: No  Decreased frequency of urination: Yes  Frequent nighttime urination: Yes  Flank pain: No  Difficulty emptying bladder: Yes  Scrotal pain or swelling: No  Erectile dysfunction: Yes  Penile discharge: No  Genital ulcers: No  Reduced libido: Yes

## 2018-07-03 NOTE — PATIENT INSTRUCTIONS
Take medication as directed.    Return for a cystoscopy.    It was a pleasure meeting with you today.  Thank you for allowing me and my team the privilege of caring for you today.  YOU are the reason we are here, and I truly hope we provided you with the excellent service you deserve.  Please let us know if there is anything else we can do for you so that we can be sure you are leaving completely satisfied with your care experience.

## 2018-07-03 NOTE — MR AVS SNAPSHOT
After Visit Summary   7/3/2018    Rolando Gonzalez    MRN: [id number]           Patient Information     Date Of Birth          1940        Visit Information        Provider Department      7/3/2018 8:00 AM Haider Chen MD [facility] Urology and [facility]rologic [facility]        Today's Diagnoses     Benign prostatic hyperplasia with nocturia    -  1      Care Instructions    Take medication as directed.    Return for a cystoscopy.    It was a pleasure meeting with you today.  Thank you for allowing me and my team the privilege of caring for you today.  YOU are the reason we are here, and I truly hope we provided you with the excellent service you deserve.  Please let us know if there is anything else we can do for you so that we can be sure you are leaving completely satisfied with your care experience.                  Follow-ups after your visit        Your next 10 appointments already scheduled     Dec 17, 2018 10:25 AM CST   (Arrive by 10:10 AM)   Return Visit with Denise Russ MD   [facility] ([facility] [place])    [address] [place] [id number] [phone]              Who to contact     Please call your clinic at [phone] to:    Ask questions about your health    Make or cancel appointments    Discuss your medicines    Learn about your test results    Speak to your doctor            Additional Information About Your Visit        [employer] Information     [employer] gives you secure access to your electronic health record. If you see a primary care provider, you can also send messages to your care team and make appointments. If you have questions, please call your primary care clinic.  If you do not have a primary care provider, please call [phone] and they will assist you.      [employer] is an electronic gateway that provides easy, online access to your medical records. With [employer], you can request  a clinic appointment, read your test results, renew a prescription or communicate with your care team.     To access your existing account, please contact your [facility] or call [phone] for assistance.        Care EveryWhere ID     This is your Care EveryWhere ID. This could be used by other organizations to access your [facility] medical records  [id number]        Your Vitals Were     Pulse Respirations BMI (Body Mass Index)             80 18 32.41 kg/m2          Blood Pressure from Last 3 Encounters:   07/03/18 134/81   06/26/18 121/73   03/27/18 145/83    Weight from Last 3 Encounters:   07/03/18 95.3 kg (210 lb)   06/26/18 95.6 kg (210 lb 12.8 oz)   03/27/18 98.4 kg (217 lb)              Today, you had the following     No orders found for display         Today's Medication Changes          These changes are accurate as of 7/3/18  9:22 AM.  If you have any questions, ask your nurse or doctor.               Start taking these medicines.        Dose/Directions    tamsulosin 0.4 MG capsule   Commonly known as:  FLOMAX   Used for:  Benign prostatic hyperplasia with nocturia   Started by:  Haider Chen MD        Dose:  0.4 mg   Take 1 capsule (0.4 mg) by mouth daily   Quantity:  30 capsule   Refills:  3            Where to get your medicines      These medications were sent to [facility] - [place], [place] - [address]  [address], [facility] [address]     Phone:  [phone]     tamsulosin 0.4 MG capsule                Primary Care Provider Office Phone # Fax #    Denise Russ [phone] [address] [address]        Equal Access to Services     [place] : Hadanthony Jon, luis lumyaadaha, qaybta kaalmada yan vidal. So [facility] [phone].    ATENCIÓN: Si habla español, tiene a redman disposición servicios gratuitos de asistencia lingüística.  Leannlisa pruett [phone].    We comply with applicable federal civil rights laws and [place] laws. We do not discriminate on the basis of race, color, national origin, age, disability, sex, sexual orientation, or gender identity.            Thank you!     Thank you for choosing [facility] UROLOGY AND [facility] FOR PROSTATE [facility] UROLOGIC CANCERS  for your care. Our goal is always to provide you with excellent care. Hearing back from our patients is one way we can continue to improve our services. Please take a few minutes to complete the written survey that you may receive in the mail after your visit with us. Thank you!             Your Updated Medication List - Protect others around you: Learn how to safely use, store and throw away your medicines at www.disposemymeds.org.          This list is accurate as of 7/3/18  9:22 AM.  Always use your most recent med list.                   Brand Name Dispense Instructions for use Diagnosis    albuterol 108 (90 Base) MCG/ACT Inhaler    PROAIR HFA/PROVENTIL HFA/VENTOLIN HFA     Inhale 2 puffs into the lungs every 6 hours        allopurinol 300 MG tablet    ZYLOPRIM    90 tablet    Take 1 tablet (300 mg) by mouth daily    Benign essential hypertension       amLODIPine 10 MG tablet    NORVASC    90 tablet    Take 1 tablet (10 mg) by mouth daily    Essential hypertension with goal blood pressure less than 130/80       AMOXICILLIN PO      Take 1,000 mg by mouth daily        aspirin 81 MG tablet      Take by mouth daily    Hearing loss, unspecified laterality, Fatigue, Diabetes mellitus type 2, insulin dependent (H), Hyperlipidemia, COPD (chronic obstructive pulmonary disease) (H)       BIOTENE DRY MOUTH Pste      Apply 1 spray to affected area        blood glucose monitoring test strip    ACCU-CHEK MREY    200 each    Test 2 times daily    Diabetes mellitus (H)       budesonide-formoterol 80-4.5 MCG/ACT Inhaler    SYMBICORT    1 Inhaler    Inhale 2 puffs into the lungs 2 times daily     Moderate persistent asthma without complication       camphor-menthol 0.5-0.5 % Lotn    DERMASARRA    1 Bottle    Applied to skin 3 times day p.r.n. for itching    Itching       celecoxib 200 MG capsule    celeBREX    90 capsule    Take 1 capsule (200 mg) by mouth daily    Diabetes mellitus type 2, uncontrolled (H)       chlorthalidone 25 MG tablet    HYGROTON    45 tablet    Take one half tablet every morning.  Discontinue the hydrochlorothiazide 12.5 mg    Edema, unspecified type       cyclobenzaprine 5 MG tablet    FLEXERIL    42 tablet    Take one or two daily as needed for severe neck neck pain.    Neck pain       fluticasone 110 MCG/ACT Inhaler    FLOVENT HFA    1 Inhaler    Inhale 2 puffs into the lungs 2 times daily    Asthma       fluticasone 50 MCG/ACT spray    FLONASE     2 sprays by Both Nostrils route daily.    Diabetes mellitus type 2, uncontrolled (H), Hyperlipidemia       fluticasone-salmeterol 500-50 MCG/DOSE diskus inhaler    ADVAIR     Inhale 1 puff into the lungs every 12 hours        gabapentin 300 MG capsule    NEURONTIN    360 capsule    Take 2 capsules (600 mg) by mouth 2 times daily    Diabetic nephropathy (H)       glipiZIDE 10 MG tablet    GLUCOTROL    180 tablet    Take 1 tablet (10 mg) by mouth 2 times daily    Diabetes mellitus type 2, uncontrolled (H)       indomethacin 50 MG capsule    INDOCIN    30 capsule    Take 1 capsule (50 mg) by mouth 3 times daily as needed Use only during a gout attack    Gout, unspecified       insulin glargine 100 UNIT/ML injection    LANTUS SOLOSTAR    60 mL    Inject 30 Units Subcutaneous 2 times daily    Diabetes mellitus type 2, uncontrolled (H)       insulin pen needle 31G X 8 MM    B-D U/F    200 each    Use twice daily or as directed. *3 month supply    Diabetes mellitus type 2, uncontrolled (H)       losartan 100 MG tablet    COZAAR    90 tablet    Take 1 tablet (100 mg) by mouth daily    Essential hypertension with goal blood pressure less than  130/80       metFORMIN modified 500 MG 24 hr tablet    GLUMETZA    450 tablet    Take 2 tablets every morning, 1 tablet at noon, and 2 tablets at bedtime.    Diabetes mellitus type 2, insulin dependent (H)       omeprazole 40 MG capsule    priLOSEC    180 capsule    Take 1 capsule (40 mg) by mouth 2 times daily    Esophageal reflux       saw palmetto 160 MG Caps      Take 160 mg by mouth daily        simethicone 125 MG Chew chewable tablet    MYLICON    30 tablet    Take 1 tablet (125 mg) by mouth 4 times daily as needed for intestinal gas        simvastatin 20 MG tablet    ZOCOR    90 tablet    Take 1 tablet (20 mg) by mouth At Bedtime    Hyperlipemia       solifenacin 5 MG tablet    VESICARE    90 tablet    Take 1 tablet (5 mg) by mouth daily    Urgency incontinence       tamsulosin 0.4 MG capsule    FLOMAX    30 capsule    Take 1 capsule (0.4 mg) by mouth daily    Benign prostatic hyperplasia with nocturia       tiotropium 18 MCG capsule    SPIRIVA HANDIHALER    30 capsule    Inhale contents of one capsule daily.    Intermittent asthma       Vitamin D-3 1000 units Caps      Take 1,000 Units by mouth daily

## 2018-07-03 NOTE — NURSING NOTE
Chief Complaint   Patient presents with     Benign Prostatic Hypertrophy     Patient is here to discuss BPH     Fernanda Singletary CMA 8:27 AM on 7/3/2018.

## 2018-07-03 NOTE — LETTER
7/3/2018       RE: Rolando Gonzalez  [address]  [place] [address]     Dear Colleague,    Thank you for referring your patient, Rolando Gonzalez, to the [facility] UROLOGY AND INST FOR PROSTATE AND UROLOGIC CANCERS at [facility]. Please see a copy of my visit note below.            Chief Complaint:   Benign Prostatic Hypertrophy         History of Present Illness:    Rolando oGnzalez is a very pleasant 77 year old male who presents with a history of DM2 with neuropathy, gout, GERD, HTN, asthma, and BPH. For the past 3 years he has been having urinary symptoms including frequency (every 1-2 hours), urgency, urge incontinence (once or twice a week), nocturia (3-4 times a night), and post void dribbling. Patient also feels like he is emptying poorly.  He has been prescribed Vesicare for overactive bladder symptoms by another provider and had an increase in dose last week which has only helped minimally. He has never been on any other medications for these problems and has never had a full work up from a Urologist.    Denies family history of prostate cancer.  Denies history of hematuria. He was having dysuria several weeks ago and a urine was sent to rule out infection which was negative.    Also had an episode of left flank pain 3/18 which was evaluated with CT imaging.  CT identified a somewhat anatomically atypical right kidney with parenchymal calcification but no stones or renal pathology bilaterally.         Past Medical History:     Past Medical History:   Diagnosis Date     Asthma, moderate persistent      Degenerative tear of meniscus      Diabetes mellitus type 2, insulin dependent (H)      Diabetic neuropathy (H)     improved with gabapentin; normal EMG 1/26/18     Diverticulosis      ED (erectile dysfunction)      Edema extremities 2/15/16    venous insufficiency     GERD (gastroesophageal reflux disease)      Gout      Hyperlipidemia      Hypertension       Moderate persistent asthma 2017    MILLI Álvarez MD allergist.  FEV1 1.83. FEF 25-75 1.5 (3/13/17)     Osteoarthritis, shoulder     right     RLL lung nodule 03/20/2017    stable 8 mm rll nodule.  No additional CT testing needed.       Urge incontinence      Vasomotor rhinitis             Past Surgical History:     Past Surgical History:   Procedure Laterality Date     CATARACT IOL, RT/LT      both eyes     L4-5 fusion              Medications     Current Outpatient Prescriptions   Medication     albuterol (PROAIR HFA, PROVENTIL HFA, VENTOLIN HFA) 108 (90 BASE) MCG/ACT inhaler     allopurinol (ZYLOPRIM) 300 MG tablet     amLODIPine (NORVASC) 10 MG tablet     AMOXICILLIN PO     aspirin 81 MG tablet     blood glucose monitoring (ACCU-CHEK MERY) test strip     budesonide-formoterol (SYMBICORT) 80-4.5 MCG/ACT Inhaler     camphor-menthol (DERMASARRA) 0.5-0.5 % LOTN     celecoxib (CELEBREX) 200 MG capsule     chlorthalidone (HYGROTON) 25 MG tablet     Cholecalciferol (VITAMIN D-3) 1000 UNITS CAPS     cyclobenzaprine (FLEXERIL) 5 MG tablet     Dentifrices (BIOTENE DRY MOUTH) PSTE     fluticasone (FLONASE) 50 MCG/ACT nasal spray     fluticasone (FLOVENT HFA) 110 MCG/ACT inhaler     fluticasone-salmeterol (ADVAIR) 500-50 MCG/DOSE diskus inhaler     gabapentin (NEURONTIN) 300 MG capsule     glipiZIDE (GLUCOTROL) 10 MG tablet     indomethacin (INDOCIN) 50 MG capsule     insulin glargine (LANTUS SOLOSTAR) 100 UNIT/ML injection     insulin pen needle (B-D U/F) 31G X 8 MM     losartan (COZAAR) 100 MG tablet     metFORMIN modified (GLUMETZA) 500 MG 24 hr tablet     omeprazole (PRILOSEC) 40 MG capsule     saw palmetto 160 MG CAPS     simethicone (MYLICON) 125 MG CHEW chewable tablet     simvastatin (ZOCOR) 20 MG tablet     solifenacin (VESICARE) 5 MG tablet     tiotropium (SPIRIVA HANDIHALER) 18 MCG inhalation capsule     No current facility-administered medications for this visit.             Family History:     Family History    Problem Relation Age of Onset     Cancer Paternal Grandmother             Social History:     Social History     Social History     Marital status: Single     Spouse name: N/A     Number of children: N/A     Years of education: N/A     Occupational History     [employer] business owner Retired     Social History Main Topics     Smoking status: Passive Smoke Exposure - Never Smoker     Last attempt to quit: 2/10/1982     Smokeless tobacco: Never Used     Alcohol use 0.5 oz/week     1 drink(s) per week     Drug use: No     Sexual activity: Yes     Partners: Female     Other Topics Concern     Exercise Yes     walks     Social History Narrative            Allergies:   Morphine hcl and Seasonal allergies         Review of Systems:  From intake questionnaire   Negative 14 system review except as noted on HPI, nurse's note.         Physical Exam:   Patient is a 77 year old  male   Vitals: There were no vitals taken for this visit.  General Appearance Adult: Alert, no acute distress, oriented  HENT: throat/mouth:normal, good dentition  Neck: No adenopathy,masses or thyromegaly  Lungs: no respiratory distress, or pursed lip breathing  Heart: No obvious jugular venous distension present  Abdomen: soft, nontender, no organomegaly or masses, protruberant abdominal midsection There is no height or weight on file to calculate BMI.  Lymphatics: No cervical or supraclavicular adenopathy  Musculoskeltal: extremities normal, no peripheral edema  Skin: no suspicious lesions or rashes  Neuro: Alert, oriented, speech and mentation normal  Psych: affect and mood normal  Gait: Normal    Post void residual urine volume by US 64 mL      Labs and Pathology:    I personally reviewed all applicable laboratory data and went over findings with patient  Significant for:    CBC RESULTS:  Recent Labs   Lab Test  03/22/18   1229  12/20/16   1750  05/31/16   1906  03/10/15   1103  07/21/14   1102   WBC  11.8*   --   11.2*  14.6*  12.9*   HGB  13.0*   14.0  13.3  13.5  15.1   PLT  193   --   211  224  250        BMP RESULTS:  Recent Labs   Lab Test  06/26/18   1427  03/22/18   1229  11/07/17   1718  08/22/16   1633  05/31/16   1906   NA   --   142  138  140  136   POTASSIUM   --   3.6  3.9  4.4  4.5   CHLORIDE   --   111*  108  109  104   CO2   --   20  21  22  25   ANIONGAP   --   12  8  10  7   GLC   --   115*  161*  175*  306*   BUN   --   30  25  32*  22   CR  1.51*  1.66*  1.79*  1.69*  1.60*   GFRESTIMATED  45*  40*  37*  40*  42*   GFRESTBLACK  54*  49*  45*  48*  51*   BRITTANY   --   8.7  8.4*  9.1  9.4       UA RESULTS:   Recent Labs   Lab Test  06/27/18   0630  03/22/18   1208  11/07/17   1600   SG  1.020  1.015  1.020   URINEPH  5.5  5.5  5.0   NITRITE  Negative  Negative  Negative   RBCU  O - 2  <1  0   WBCU  0 - 5  3  2       CALCIUM RESULTS  Lab Results   Component Value Date    BRITTANY 8.7 03/22/2018    BRITTANY 8.4 11/07/2017    BRITTANY 9.1 08/22/2016       PSA RESULTS  PSA   Date Value Ref Range Status   11/07/2017 2.97 0 - 4 ug/L Final     Comment:     Assay Method:  Chemiluminescence using Siemens Vista analyzer   05/31/2016 3.46 0 - 4 ug/L Final   02/24/2010 1.78 0 - 4 ug/L Final   12/19/2007 1.34 0 - 4 ug/L Final   03/27/2006 1.89 0 - 4 ug/L Final       INR  Recent Labs   Lab Test  03/22/18   1229   INR  1.05           Imaging:    I personally reviewed all applicable imaging and went over findings with patient.  Significant for:    Results for orders placed or performed during the hospital encounter of 03/22/18   POC US RETROPERITONEAL LIMITED    Impression    Limited Bedside Renal Ultrasound, performed and interpreted by me.    Indication: Flank pain  Images of the the right and left kidney were acquired in short and long axis, evaluating for hydronephrosis. A short and long axis of the bladder was evaluated for bladder stones. Image quality was satisfactory..     Findings:  No evidence of hydronephrosis in bilateral kidneys.    No urinary bladder stones  seen.         IMPRESSION: No evidence of hydronephrosis or stones. But right kidney small.    [facility] Procedure Note      Limited Bedside ED Aorta Ultrasound:    PROCEDURE: PERFORMED BY: Dr. Bandar Santoro MD  INDICATIONS:  Flank pain    PROBE:  Low frequency convex probe  BODY LOCATION: Abdomen  FINDINGS:  The ultrasound was performed using longitudinal and transverse views.   Normal: Abdominal aorta < 4 cm.  MEASUREMENT:  1.9 cm   No evidence of free fluid in hepatorenal (Morison s pouch), perisplenic, or and pelvic areas. No evidence of pericardial effusion.  INTERPRETATION:  The evaluation of the aorta was of normal caliber (ie < 4cm in the transverse/longitudinal views) without evidence of aneurysm or dilation.  Aorta visualized in entirety from insertion into the intra-abdominal cavity to bifurcation into iliac vessels. There was no abdominal free fluid.  IMAGE DOCUMENTATION: Images were archived to PACs system.     CT Abdomen Pelvis w/o Contrast    Narrative    EXAMINATION: CT ABDOMEN PELVIS W/O CONTRAST, 3/22/2018 1:59 PM    TECHNIQUE:  Helical CT images from the lung bases through the  symphysis pubis were obtained without IV contrast.    COMPARISON: CT abdomen/pelvis 8/21/2009    HISTORY: left flank pain;     FINDINGS:    Abdomen and pelvis: The right kidney is abnormal in axis of rotation  and is positioned lower in the pelvis than is typical. The right renal  artery arises from the distal abdominal aorta. There are at least 2  right renal veins, one of the transiting to the IVC in one of the  drains into the left common iliac vein. The left kidney is normal in  axis and position.    Calcification in the renal sinus of the interpolar region of the right  kidney (series 5 image 252) is slightly increased in size since CT  8/21/2009, favored to represent vascular calcification. No  urolithiasis. Mildly lobulated contour to both kidneys is unchanged  since 2009. No hydronephrosis. Urinary bladder  is normal. Prostate  gland is enlarged measuring 4.4 x 4.3 x 5.2 cm.    The liver is enlarged measuring 21.1 cm in length, not significantly  changed since 2009. Otherwise, noncontrast appearance to the liver,  spleen, adrenals, and pancreas is normal. Gallbladder is not  visualized, similar to prior exam, possibly surgically absent. No  dilated small or large bowel. Diverticulosis without evidence of  diverticulitis. Unchanged thickening along the left  retroperitoneum/lateral coronal fascia. Normal appendix. No  intraperitoneal free fluid, free air, pneumatosis, or portal venous  gas. No abdominal aortic aneurysm. Borderline enlarged dory hepatis  lymph node (series 5 image 106) measures 12 mm, unchanged.    Lung bases: The visualized portions of the lower lungs are clear.  Calcification along the pleural surface of the left hemidiaphragm,  increased since 2009.    Bones and soft tissues: Degenerative changes in the lumbar spine.  Postsurgical changes of anterior fusion of L5-S1. Bilateral pars  interarticularis defects of L5, unchanged. Increased heterogeneous  lucency in the right lateral aspect of the L4 vertebral body, likely  degenerative in etiology. Bone island in the left acetabulum. No acute  or suspicious osseous lesion.      Impression    IMPRESSION:   1. No acute findings in the abdomen/pelvis to explain the patient's  left flank pain. Calcification in the interpolar region of the right  kidney is slightly increased in size since CT 8/21/2009, favored to  represent vascular calcification. No urolithiasis or hydronephrosis.   2. Focally advanced degenerative changes at L5-S1 consisting of  bilateral pars interarticularis defects, severe disc space loss, and  significant bilateral neuroforaminal narrowing. There are also  postsurgical changes of anterior fusion at L5-S1.  3. Increased pleural calcifications over the left hemidiaphragmatic  surface since 2009.  Differential includes the sequela of  prior  trauma/hemothorax versus less likely related to asbestos exposure  given lack of pleural calcification seen elsewhere in the chest.   4. Unchanged hepatomegaly since 2009.    I have personally reviewed the examination and initial interpretation  and I agree with the findings.    MARLENE BALTAZAR MD          Standardized Questionnaire:      [employer] SYMPTOM SCORE  SUM 26/35  QOL 6/6           Assessment and Plan:     Assessment: 77 year old male w/ hx of suspected BPH    Plan:  -Discussed treatment options for BPH including alpha blockers, anticholinergics, MIST and surgical therapy  -Interested in trial of medical management with alpha blockers.  Counseled regarding risks and benefit profile  -Will try flomax, return for potential cystoscopy if symptoms persist to assess for prostatic anatomy and determine eligibility for possible DELCID intervention.  Alternative explanations for urinary symptoms include neuropathy, diabetes    I, Haider Chen saw and evaluated this patient and agree with the plan as stated above.  I personally performed all listed procedures.     CC:  Denise Russ

## 2018-07-12 ENCOUNTER — PRE VISIT (OUTPATIENT)
Dept: UROLOGY | Facility: CLINIC | Age: 78
End: 2018-07-12

## 2018-07-25 ENCOUNTER — OFFICE VISIT (OUTPATIENT)
Dept: INTERNAL MEDICINE | Facility: CLINIC | Age: 78
End: 2018-07-25
Payer: COMMERCIAL

## 2018-07-25 ENCOUNTER — RADIANT APPOINTMENT (OUTPATIENT)
Dept: GENERAL RADIOLOGY | Facility: CLINIC | Age: 78
End: 2018-07-25
Attending: FAMILY MEDICINE
Payer: COMMERCIAL

## 2018-07-25 ENCOUNTER — OFFICE VISIT (OUTPATIENT)
Dept: ORTHOPEDICS | Facility: CLINIC | Age: 78
End: 2018-07-25
Payer: COMMERCIAL

## 2018-07-25 VITALS
BODY MASS INDEX: 32.87 KG/M2 | HEART RATE: 84 BPM | DIASTOLIC BLOOD PRESSURE: 77 MMHG | SYSTOLIC BLOOD PRESSURE: 125 MMHG | WEIGHT: 213 LBS

## 2018-07-25 VITALS
HEART RATE: 84 BPM | WEIGHT: 213.8 LBS | BODY MASS INDEX: 32.99 KG/M2 | SYSTOLIC BLOOD PRESSURE: 125 MMHG | OXYGEN SATURATION: 97 % | DIASTOLIC BLOOD PRESSURE: 77 MMHG

## 2018-07-25 DIAGNOSIS — E11.9 DIABETES MELLITUS TYPE 2, INSULIN DEPENDENT (H): ICD-10-CM

## 2018-07-25 DIAGNOSIS — E11.9 DIABETES MELLITUS TYPE 2, INSULIN DEPENDENT (H): Primary | ICD-10-CM

## 2018-07-25 DIAGNOSIS — R30.0 DYSURIA: ICD-10-CM

## 2018-07-25 DIAGNOSIS — M19.011 GLENOHUMERAL ARTHRITIS, RIGHT: ICD-10-CM

## 2018-07-25 DIAGNOSIS — G89.29 CHRONIC RIGHT SHOULDER PAIN: ICD-10-CM

## 2018-07-25 DIAGNOSIS — Z79.4 DIABETES MELLITUS TYPE 2, INSULIN DEPENDENT (H): Primary | ICD-10-CM

## 2018-07-25 DIAGNOSIS — M25.511 CHRONIC RIGHT SHOULDER PAIN: ICD-10-CM

## 2018-07-25 DIAGNOSIS — M25.511 CHRONIC RIGHT SHOULDER PAIN: Primary | ICD-10-CM

## 2018-07-25 DIAGNOSIS — M25.311 INSUFFICIENCY OF RIGHT ROTATOR CUFF: ICD-10-CM

## 2018-07-25 DIAGNOSIS — G89.29 CHRONIC RIGHT SHOULDER PAIN: Primary | ICD-10-CM

## 2018-07-25 DIAGNOSIS — Z79.4 DIABETES MELLITUS TYPE 2, INSULIN DEPENDENT (H): ICD-10-CM

## 2018-07-25 LAB — HBA1C MFR BLD: 6 % (ref 0–5.6)

## 2018-07-25 RX ORDER — HYDROCODONE BITARTRATE AND ACETAMINOPHEN 5; 325 MG/1; MG/1
1 TABLET ORAL EVERY 4 HOURS PRN
Qty: 20 TABLET | Refills: 0 | Status: ON HOLD | OUTPATIENT
Start: 2018-07-25 | End: 2019-07-24

## 2018-07-25 ASSESSMENT — PAIN SCALES - GENERAL: PAINLEVEL: EXTREME PAIN (8)

## 2018-07-25 NOTE — PROGRESS NOTES
SPORTS & ORTHOPEDIC WALK-IN VISIT 7/25/2018    Primary Care Physician: Dr. Russ    Anterior shoulder pain, getting worse over the last six months. Has gotten bad the last three weeks to a month. Had left rotator cuff surgery about 10 years ago. Feels similar to that problem. Took two Vicodin today and is using cannabis lotion.     Reason for visit:     What part of your body is injured / painful?  right shoulder    What caused the injury /pain? Unsure    How long ago did your injury occur or pain begin? several months ago    What are your most bothersome symptoms? Pain, clicking/popping, weakness    How would you characterize your symptom?  sharp    What makes your symptoms better? Other: cannabis lotion     What makes your symptoms worse? Movement    Have you been previously seen for this problem? Yes, IM    Medical History:    Any recent changes to your medical history? No    Any new medication prescribed since last visit? No     Have you had surgery on this body part before? No    Social History:    Occupation: retired    Handedness: Right    Exercise: None    Review of Systems:    Do you have fever, chills, weight loss? No    Do you have any vision problems? Yes, acting up a little bit, floaters    Do you have any chest pain or edema? No    Do you have any shortness of breath or wheezing?  No    Do you have stomach problems? No    Do you have any numbness or focal weakness? Yes, weakness in the arm    Do you have diabetes? Yes, type 2    Do you have problems with bleeding or clotting? No    Do you have an rashes or other skin lesions? No

## 2018-07-25 NOTE — PROGRESS NOTES
"[facility]  Orthopedics  Wilian Gonzalez MD  2018     Name: Rolando Gonzalez  MRN: [id number]  Age: 77 year old  : 1940  Referring provider: Michael Juarez     Chief Complaint:  Right shoulder pain      History of Present Illness:   Rolando Gonzalez is a 77 year old, right handed male who presents today for evaluation of right shoulder pain that has been present for 6 months with no inciting event. He voices it has been manageable but about a month ago began to worsen. He has a history of left rotator cuff repair that was a result of pushing hay bills, and this pain is similar to that and began around the same time.  his right shoulder has intermittently bothered him since. He describes the sharp pain to be in the anterior aspect of the shoulder and in the back \"when it pulls on the vertebrae\". He has exacerbated pains with certain movements and intermittently has clicking, popping, and weakness. He has been using a cannabis lotion with heat and states this has been somewhat helpful. He took Vicodin prior to presenting here today. No neck pain. No tingling, numbness, or radiation of pain into arm.     Review of Systems:   A 14-point review of systems was obtained and is negative except for as noted in the HPI.     Medications:     Current Outpatient Prescriptions:      albuterol (PROAIR HFA, PROVENTIL HFA, VENTOLIN HFA) 108 (90 BASE) MCG/ACT inhaler, Inhale 2 puffs into the lungs every 6 hours, Disp: , Rfl:      allopurinol (ZYLOPRIM) 300 MG tablet, Take 1 tablet (300 mg) by mouth daily, Disp: 90 tablet, Rfl: 0     amLODIPine (NORVASC) 10 MG tablet, Take 1 tablet (10 mg) by mouth daily, Disp: 90 tablet, Rfl: 3     AMOXICILLIN PO, Take 1,000 mg by mouth daily, Disp: , Rfl:      aspirin 81 MG tablet, Take by mouth daily, Disp: , Rfl:      blood glucose monitoring (ACCU-CHEK MERY) test strip, Test 2 times daily, Disp: 200 each, Rfl: 3     budesonide-formoterol (SYMBICORT) 80-4.5 MCG/ACT Inhaler, Inhale 2 " puffs into the lungs 2 times daily, Disp: 1 Inhaler, Rfl: 1     camphor-menthol (DERMASARRA) 0.5-0.5 % LOTN, Applied to skin 3 times day p.r.n. for itching, Disp: 1 Bottle, Rfl: 3     celecoxib (CELEBREX) 200 MG capsule, Take 1 capsule (200 mg) by mouth daily, Disp: 90 capsule, Rfl: 3     chlorthalidone (HYGROTON) 25 MG tablet, Take one half tablet every morning.  Discontinue the hydrochlorothiazide 12.5 mg, Disp: 45 tablet, Rfl: 3     Cholecalciferol (VITAMIN D-3) 1000 UNITS CAPS, Take 1,000 Units by mouth daily, Disp: , Rfl:      cyclobenzaprine (FLEXERIL) 5 MG tablet, Take one or two daily as needed for severe neck neck pain., Disp: 42 tablet, Rfl: 0     Dentifrices (BIOTENE DRY MOUTH) PSTE, Apply 1 spray to affected area, Disp: , Rfl:      fluticasone (FLONASE) 50 MCG/ACT nasal spray, 2 sprays by Both Nostrils route daily., Disp: , Rfl:      fluticasone (FLOVENT HFA) 110 MCG/ACT inhaler, Inhale 2 puffs into the lungs 2 times daily, Disp: 1 Inhaler, Rfl: 12     fluticasone-salmeterol (ADVAIR) 500-50 MCG/DOSE diskus inhaler, Inhale 1 puff into the lungs every 12 hours, Disp: , Rfl:      gabapentin (NEURONTIN) 300 MG capsule, Take 2 capsules (600 mg) by mouth 2 times daily, Disp: 360 capsule, Rfl: 3     glipiZIDE (GLUCOTROL) 10 MG tablet, Take 1 tablet (10 mg) by mouth 2 times daily, Disp: 180 tablet, Rfl: 1     HYDROcodone-acetaminophen (NORCO) 5-325 MG per tablet, Take 1 tablet by mouth every 4 hours as needed for pain, Disp: 20 tablet, Rfl: 0     indomethacin (INDOCIN) 50 MG capsule, Take 1 capsule (50 mg) by mouth 3 times daily as needed Use only during a gout attack, Disp: 30 capsule, Rfl: 0     insulin glargine (LANTUS SOLOSTAR) 100 UNIT/ML injection, Inject 30 Units Subcutaneous 2 times daily, Disp: 60 mL, Rfl: 1     insulin pen needle (B-D U/F) 31G X 8 MM, Use twice daily or as directed. *3 month supply, Disp: 200 each, Rfl: 3     losartan (COZAAR) 100 MG tablet, Take 1 tablet (100 mg) by mouth daily, Disp:  90 tablet, Rfl: 3     metFORMIN modified (GLUMETZA) 500 MG 24 hr tablet, Take 2 tablets every morning, 1 tablet at noon, and 2 tablets at bedtime., Disp: 450 tablet, Rfl: 3     omeprazole (PRILOSEC) 40 MG capsule, Take 1 capsule (40 mg) by mouth 2 times daily, Disp: 180 capsule, Rfl: 3     saw palmetto 160 MG CAPS, Take 160 mg by mouth daily, Disp: , Rfl:      simethicone (MYLICON) 125 MG CHEW chewable tablet, Take 1 tablet (125 mg) by mouth 4 times daily as needed for intestinal gas, Disp: 30 tablet, Rfl: 0     simvastatin (ZOCOR) 20 MG tablet, Take 1 tablet (20 mg) by mouth At Bedtime, Disp: 90 tablet, Rfl: 0     solifenacin (VESICARE) 5 MG tablet, Take 1 tablet (5 mg) by mouth daily, Disp: 90 tablet, Rfl: 2     tamsulosin (FLOMAX) 0.4 MG capsule, Take 1 capsule (0.4 mg) by mouth daily, Disp: 30 capsule, Rfl: 3     tiotropium (SPIRIVA HANDIHALER) 18 MCG inhalation capsule, Inhale contents of one capsule daily., Disp: 30 capsule, Rfl: 1    Allergies:  Morphine     Past Medical History:  Asthma  Degenerative tear of meniscus   Type II diabetes mellitus   Diabetic neuropathy   Diverticulosis   Edema extremities   GERD  Erectile dysfunction   Gout   Hyperlipidemia   Hypertension   Osteoarthritis shoulder right   RLL lung nodule   Urge incontinence     Past Surgical History:  L4-L5 infusion      Social History:  Presents with a male    Retired   He denies tobacco use and admits to mild alcohol use.    Family History:   Negative for bleeding or clotting disorders or adverse reactions to anesthesia.    Physical Examination:  Blood pressure 125/77, pulse 84, weight 96.6 kg (213 lb).  EXAM:  Alert, pleasant and conversational    Neck with full AROM, non-tender to midline cervical and upper thoracic spine palpation, negative Spurling's.  Elbow with FROM and non-tender to palpation      right shoulder:   Skin intact. No skin changes, deformity or atrophy    AROM: Symmetric without pain.     Strength testing:  Abduction: 4+/5, with pain. External rotation: 5/5, Internal rotation 5/5.  Deltoids 5/5, Biceps 5/5, Triceps 5/5,  5/5.    Palpation:  TTP in the kylah scapular area on the right.    negative TTP of the Acromioclavicular joint  negative TTP of Sternoclavicular joint  negative TTP of posterior glenoid  negative TTP of scapular borders  negative TTP of the bicipital tendon.     Special Tests:   Cross body adduction positive   positive  Escobedo test  positive  Neer's test.   positive Thatcher's test   positive  Speed's test.    Neurovascularly intact bilateral upper extremities.     Imaging:   four views of the right shoulder were obtained today and independently reviewed demonstrating significant osteoarthritis in the glenohumeral joint.  No acute fracture or dislocation.    I have independently reviewed the above imaging studies; the results were discussed with the patient.     Assessment:   77 year old, right handed male with right shoulder pain.     Plan:   After discussion the patient's condition and imaging, I recommended formal physical therapy and glenohumeral injections.  While physical therapy will not be able to fix the glenohumeral arthritis, and will likely lead to benefit of his periscapular pain, which at the current time is his most bothersome symptom.  He will schedule follow-up appointment if he would like to pursue glenohumeral joint injection.  All the patient's questions were answered and he elected to proceed with my recommendations.     Wilian Gonzalez MD      Scribe Disclosure:   I, Pito Ac, am serving as a scribe to document services personally performed by Wilian Gonzalez MD at this visit, based upon the provider's statements to me. All documentation has been reviewed by the aforementioned provider prior to being entered into the official medical record.     Portions of this medical record were completed by a scribe. UPON MY REVIEW AND AUTHENTICATION BY ELECTRONIC SIGNATURE, this  confirms (a) I performed the applicable clinical services, and (b) the record is accurate.

## 2018-07-25 NOTE — MR AVS SNAPSHOT
After Visit Summary   7/25/2018    Rolando Gonzalez    MRN: [id number]           Patient Information     Date Of Birth          1940        Visit Information        Provider Department      7/25/2018 11:00 AM Michael Juarez MD [facility]        Today's Diagnoses     Diabetes mellitus type 2, insulin dependent (H)    -  1    Insufficiency of right rotator cuff          Care Instructions    Primary Care Center Medication Refill Request Information:  * Please contact your pharmacy regarding ANY request for medication refills.  ** [facility] Prescription Fax = [phone]  * Please allow 3 business days for routine medication refills.  * Please allow 5 business days for controlled substance medication refills.     Primary Care Center Test Result notification information:  *You will be notified with in 7-10 days of your appointment day regarding the results of your test.  If you are on MyChart you will be notified as soon as the provider has reviewed the results and signed off on them.    [facility]: [phone]           Follow-ups after your visit        Additional Services     ORTHOPEDICS ADULT REFERRAL       [facility] [phone]   [email or web address]      Please be aware that coverage of these services is subject to the terms and limitations of your health insurance plan.  Call member services at your health plan with any benefit or coverage questions.      Please bring the following to your appointment:    >>   Any x-rays, CTs or MRIs which have been performed.  Contact the facility where they were done to arrange for  prior to your scheduled appointment.    >>   List of current medications   >>   This referral request   >>   Any documents/labs given to you for this referral                  Your next 10 appointments already scheduled     Jul 25, 2018 12:15 PM CDT    LAB with  LAB   [facility] ([facility])    [address] [place] [address]  1st [facility] [id number] [phone]           Please do not eat 10-12 hours before your appointment if you are coming in fasting for labs on lipids, cholesterol, or glucose (sugar). This does not apply to pregnant women. Water, hot tea and black coffee (with nothing added) are okay. Do not drink other fluids, diet soda or chew gum.            Sep 04, 2018 12:40 PM CDT   (Arrive by 12:25 PM)   Cystoscopy with Haider Chen MD   [facility] ([facility])    [address] [place] [address]  4th [facility] [id number] [phone]            Dec 17, 2018 10:25 AM CST   (Arrive by 10:10 AM)   Return Visit with Denise Russ MD   [facility] ([facility])    [address] [place] [id number] [phone]              Future tests that were ordered for you today     Open Future Orders        Priority Expected Expires Ordered    HEMOGLOBIN A1C -(Today) Routine 7/25/2018 7/25/2019 7/25/2018            Who to contact     Please call your clinic at [phone] to:    Ask questions about your health    Make or cancel appointments    Discuss your medicines    Learn about your test results    Speak to your doctor            Additional Information About Your Visit        Ribbit Information     Ribbit gives you secure access to your electronic health record. If you see a primary care provider, you can also send messages to your care team and make appointments. If you have questions, please call your primary care clinic.  If you do not have a primary care provider, please call [phone] and they will assist you.      Ribbit is an electronic gateway that provides easy, online access to your medical records. With Ribbit, you can request a clinic appointment, read your  test results, renew a prescription or communicate with your care team.     To access your existing account, please contact your [facility] or call [phone] for assistance.        Care EveryWhere ID     This is your Care EveryWhere ID. This could be used by other organizations to access your [facility] medical records  [id number]        Your Vitals Were     Pulse Pulse Oximetry BMI (Body Mass Index)             84 97% 32.99 kg/m2          Blood Pressure from Last 3 Encounters:   07/25/18 125/77   07/03/18 134/81   06/26/18 121/73    Weight from Last 3 Encounters:   07/25/18 97 kg (213 lb 12.8 oz)   07/03/18 95.3 kg (210 lb)   06/26/18 95.6 kg (210 lb 12.8 oz)              We Performed the Following     Albumin Random Urine Quantitative with Creat Ratio     ORTHOPEDICS ADULT REFERRAL          Today's Medication Changes          These changes are accurate as of 7/25/18 12:04 PM.  If you have any questions, ask your nurse or doctor.               Start taking these medicines.        Dose/Directions    HYDROcodone-acetaminophen 5-325 MG per tablet   Commonly known as:  NORCO   Used for:  Insufficiency of right rotator cuff   Started by:  Michael Juarez MD        Dose:  1 tablet   Take 1 tablet by mouth every 4 hours as needed for pain   Quantity:  20 tablet   Refills:  0            Where to get your medicines      Some of these will need a paper prescription and others can be bought over the counter.  Ask your nurse if you have questions.     Bring a paper prescription for each of these medications     HYDROcodone-acetaminophen 5-325 MG per tablet               Information about OPIOIDS     PRESCRIPTION OPIOIDS: WHAT YOU NEED TO KNOW   We gave you an opioid (narcotic) pain medicine. It is important to manage your pain, but opioids are not always the best choice. You should first try all the other options your care team gave you. Take this medicine for as short a time (and as few  doses) as possible.     These medicines have risks:    DO NOT drive when on new or higher doses of pain medicine. These medicines can affect your alertness and reaction times, and you could be arrested for driving under the influence (DUI). If you need to use opioids long-term, talk to your care team about driving.    DO NOT operate heave machinery    DO NOT do any other dangerous activities while taking these medicines.     DO NOT drink any alcohol while taking these medicines.      If the opioid prescribed includes acetaminophen, DO NOT take with any other medicines that contain acetaminophen. Read all labels carefully. Look for the word  acetaminophen  or  Tylenol.  Ask your pharmacist if you have questions or are unsure.    You can get addicted to pain medicines, especially if you have a history of addiction (chemical, alcohol or substance dependence). Talk to your care team about ways to reduce this risk.    Store your pills in a secure place, locked if possible. We will not replace any lost or stolen medicine. If you don t finish your medicine, please throw away (dispose) as directed by your pharmacist. The [place] Pollution Control Agency has more information about safe disposal: https://[email or web address]/living-green/managing-unwanted-medications.     All opioids tend to cause constipation. Drink plenty of water and eat foods that have a lot of fiber, such as fruits, vegetables, prune juice, apple juice and high-fiber cereal. Take a laxative (Miralax, milk of magnesia, Colace, Senna) if you don t move your bowels at least every other day.          Primary Care Provider Office Phone # Fax #    Denise Russ [phone]       [address] [address]        Equal Access to Services     [facility] : Mojgan Jon, wajose ramon shah, qayan moncada. So [facility] [phone].    ATENCIÓN: Ritika bertrand  español, tiene a redman disposición servicios gratuitos de asistencia lingüística. Skip pruett [phone].    We comply with applicable federal civil rights laws and [place] laws. We do not discriminate on the basis of race, color, national origin, age, disability, sex, sexual orientation, or gender identity.            Thank you!     Thank you for choosing [facility]  for your care. Our goal is always to provide you with excellent care. Hearing back from our patients is one way we can continue to improve our services. Please take a few minutes to complete the written survey that you may receive in the mail after your visit with us. Thank you!             Your Updated Medication List - Protect others around you: Learn how to safely use, store and throw away your medicines at www.disposemymeds.org.          This list is accurate as of 7/25/18 12:04 PM.  Always use your most recent med list.                   Brand Name Dispense Instructions for use Diagnosis    albuterol 108 (90 Base) MCG/ACT Inhaler    PROAIR HFA/PROVENTIL HFA/VENTOLIN HFA     Inhale 2 puffs into the lungs every 6 hours        allopurinol 300 MG tablet    ZYLOPRIM    90 tablet    Take 1 tablet (300 mg) by mouth daily    Benign essential hypertension       amLODIPine 10 MG tablet    NORVASC    90 tablet    Take 1 tablet (10 mg) by mouth daily    Essential hypertension with goal blood pressure less than 130/80       AMOXICILLIN PO      Take 1,000 mg by mouth daily        aspirin 81 MG tablet      Take by mouth daily    Hearing loss, unspecified laterality, Fatigue, Diabetes mellitus type 2, insulin dependent (H), Hyperlipidemia, COPD (chronic obstructive pulmonary disease) (H)       BIOTENE DRY MOUTH Pste      Apply 1 spray to affected area        blood glucose monitoring test strip    ACCU-CHEK MERY    200 each    Test 2 times daily    Diabetes mellitus (H)       budesonide-formoterol 80-4.5 MCG/ACT Inhaler    SYMBICORT    1 Inhaler     Inhale 2 puffs into the lungs 2 times daily    Moderate persistent asthma without complication       camphor-menthol 0.5-0.5 % Lotn    DERMASARRA    1 Bottle    Applied to skin 3 times day p.r.n. for itching    Itching       celecoxib 200 MG capsule    celeBREX    90 capsule    Take 1 capsule (200 mg) by mouth daily    Diabetes mellitus type 2, uncontrolled (H)       chlorthalidone 25 MG tablet    HYGROTON    45 tablet    Take one half tablet every morning.  Discontinue the hydrochlorothiazide 12.5 mg    Edema, unspecified type       cyclobenzaprine 5 MG tablet    FLEXERIL    42 tablet    Take one or two daily as needed for severe neck neck pain.    Neck pain       fluticasone 110 MCG/ACT Inhaler    FLOVENT HFA    1 Inhaler    Inhale 2 puffs into the lungs 2 times daily    Asthma       fluticasone 50 MCG/ACT spray    FLONASE     2 sprays by Both Nostrils route daily.    Diabetes mellitus type 2, uncontrolled (H), Hyperlipidemia       fluticasone-salmeterol 500-50 MCG/DOSE diskus inhaler    ADVAIR     Inhale 1 puff into the lungs every 12 hours        gabapentin 300 MG capsule    NEURONTIN    360 capsule    Take 2 capsules (600 mg) by mouth 2 times daily    Diabetic nephropathy (H)       glipiZIDE 10 MG tablet    GLUCOTROL    180 tablet    Take 1 tablet (10 mg) by mouth 2 times daily    Diabetes mellitus type 2, uncontrolled (H)       HYDROcodone-acetaminophen 5-325 MG per tablet    NORCO    20 tablet    Take 1 tablet by mouth every 4 hours as needed for pain    Insufficiency of right rotator cuff       indomethacin 50 MG capsule    INDOCIN    30 capsule    Take 1 capsule (50 mg) by mouth 3 times daily as needed Use only during a gout attack    Gout, unspecified       insulin glargine 100 UNIT/ML injection    LANTUS SOLOSTAR    60 mL    Inject 30 Units Subcutaneous 2 times daily    Diabetes mellitus type 2, uncontrolled (H)       insulin pen needle 31G X 8 MM    B-D U/F    200 each    Use twice daily or as  directed. *3 month supply    Diabetes mellitus type 2, uncontrolled (H)       losartan 100 MG tablet    COZAAR    90 tablet    Take 1 tablet (100 mg) by mouth daily    Essential hypertension with goal blood pressure less than 130/80       metFORMIN modified 500 MG 24 hr tablet    GLUMETZA    450 tablet    Take 2 tablets every morning, 1 tablet at noon, and 2 tablets at bedtime.    Diabetes mellitus type 2, insulin dependent (H)       omeprazole 40 MG capsule    priLOSEC    180 capsule    Take 1 capsule (40 mg) by mouth 2 times daily    Esophageal reflux       saw palmetto 160 MG Caps      Take 160 mg by mouth daily        simethicone 125 MG Chew chewable tablet    MYLICON    30 tablet    Take 1 tablet (125 mg) by mouth 4 times daily as needed for intestinal gas        simvastatin 20 MG tablet    ZOCOR    90 tablet    Take 1 tablet (20 mg) by mouth At Bedtime    Hyperlipemia       solifenacin 5 MG tablet    VESICARE    90 tablet    Take 1 tablet (5 mg) by mouth daily    Urgency incontinence       tamsulosin 0.4 MG capsule    FLOMAX    30 capsule    Take 1 capsule (0.4 mg) by mouth daily    Benign prostatic hyperplasia with nocturia       tiotropium 18 MCG capsule    SPIRIVA HANDIHALER    30 capsule    Inhale contents of one capsule daily.    Intermittent asthma       Vitamin D-3 1000 units Caps      Take 1,000 Units by mouth daily

## 2018-07-25 NOTE — PROGRESS NOTES
SUBJECTIVE: Rolando Gonzalez is a 77 year old male with history of hypertension, hyperlipidemia, type 2 diabetes mellitus, major depression, moderate persistent asthma, and kidney stone, who presents left shoulder pain.  He has had left rotator cuff surgery.  For the past 6 months, the pain has become progressively worse and especially over the past 4 weeks.   He denies injury.  He has been using medical cannabis lotion, as needed Vicodin.  He can not lift the shoulder above his head.  He reports weakness in his .    The pain is located in the anterior shoulder.     He is due for diabetes labs.     He does not smoke, he does not drink.  He lives by himself.  He is able to feed himself and get clothes on.     Past Medical History:   Diagnosis Date     Asthma, moderate persistent      Degenerative tear of meniscus      Diabetes mellitus type 2, insulin dependent (H)      Diabetic neuropathy (H)     improved with gabapentin; normal EMG 1/26/18     Diverticulosis      ED (erectile dysfunction)      Edema extremities 2/15/16    venous insufficiency     GERD (gastroesophageal reflux disease)      Gout      Hyperlipidemia      Hypertension      Moderate persistent asthma 2017    MILLI Álvarez MD allergist.  FEV1 1.83. FEF 25-75 1.5 (3/13/17)     Osteoarthritis, shoulder     right     RLL lung nodule 03/20/2017    stable 8 mm rll nodule.  No additional CT testing needed.       Urge incontinence      Vasomotor rhinitis        OBJECTIVE: /77  Pulse 84  Wt 97 kg (213 lb 12.8 oz)  SpO2 97%  BMI 32.99 kg/m2   /77  Pulse 84  Wt 97 kg (213 lb 12.8 oz)  SpO2 97%  BMI 32.99 kg/m2  GENERAL APPEARANCE: healthy, alert and no distress  RESP: lungs clear to auscultation - no rales, rhonchi or wheezes  CV: regular rates and rhythm, normal S1 S2, no S3 or S4 and no murmur, click or rub -  MS: right shoulder: Tenderness to palpation anterior below AC joint; tenderness to resistance with abduction with internal  rotation  Ext: warm.  Edema NO    ASSESSMENT: (E11.9,  Z79.4) Diabetes mellitus type 2, insulin dependent (H)  (primary encounter diagnosis)  Comment: due for surveillance labs, ordered today  Plan: HEMOGLOBIN A1C -(Today), Albumin Random Urine         Quantitative with Creat Ratio, CANCELED:         Albumin Random Urine Quantitative with Creat         Ratio        Follow up with Dr. Russ per routine    (M25.311) Insufficiency of right rotator cuff  Comment: Given his progressive pain and exam, I am concerned about a rotator cuff tear/ injury.  Will refer to Ortho walk in clinic; discussed that they may recommend a trial of PT  Plan: HYDROcodone-acetaminophen (NORCO) 5-325 MG per         tablet, ORTHOPEDICS ADULT REFERRAL

## 2018-07-25 NOTE — MR AVS SNAPSHOT
After Visit Summary   7/25/2018    Rolando Gonzalez    MRN: [id number]           Patient Information     Date Of Birth          1940        Visit Information        Provider Department      7/25/2018 12:30 PM Wilian Gonzalez MD [facility]        Today's Diagnoses     Chronic right shoulder pain    -  1    Glenohumeral arthritis, right           Follow-ups after your visit        Additional Services     [facility] PT, HAND, AND CHIROPRACTIC REFERRAL       Physical Therapy Referral                  Your next 10 appointments already scheduled     Jul 29, 2018  1:00 PM CDT   Return Walk In Ortho with Wilian Gonzalez MD   [facility] ([facility])    [address] [place] [id number] [phone]            Aug 02, 2018  1:50 PM CDT   (Arrive by 1:35 PM)   ROSALIE Extremity with Royce Bedoya PT   [facility] ([facility])    [address] [place] [address]  [address]  [place] [id number] [phone]            Sep 04, 2018 12:40 PM CDT   (Arrive by 12:25 PM)   Cystoscopy with Haider Chen MD   [facility] ([facility])    [address] [place] [id number] [phone]            Dec 17, 2018 10:25 AM CST   (Arrive by 10:10 AM)   Return Visit with Denise Russ MD   [facility] ([facility])    [address] [place] [id number] [phone]              Who to contact     Please call your clinic at [phone] to:    Ask questions about your health    Make or cancel appointments    Discuss your medicines    Learn about your test results    Speak to your doctor            Additional Information About Your Visit        MyChart Information      [employer] gives you secure access to your electronic health record. If you see a primary care provider, you can also send messages to your care team and make appointments. If you have questions, please call your primary care clinic.  If you do not have a primary care provider, please call [phone] and they will assist you.      [employer] is an electronic gateway that provides easy, online access to your medical records. With [employer], you can request a clinic appointment, read your test results, renew a prescription or communicate with your care team.     To access your existing account, please contact your [facility] or call [phone] for assistance.        Care EveryWhere ID     This is your Care EveryWhere ID. This could be used by other organizations to access your [facility] medical records  [id number]        Your Vitals Were     Pulse BMI (Body Mass Index)                84 32.87 kg/m2           Blood Pressure from Last 3 Encounters:   07/25/18 125/77   07/25/18 125/77   07/03/18 134/81    Weight from Last 3 Encounters:   07/25/18 213 lb (96.6 kg)   07/25/18 213 lb 12.8 oz (97 kg)   07/03/18 210 lb (95.3 kg)              We Performed the Following     [facility] PT, HAND, AND CHIROPRACTIC REFERRAL          Today's Medication Changes          These changes are accurate as of 7/25/18 11:59 PM.  If you have any questions, ask your nurse or doctor.               Start taking these medicines.        Dose/Directions    HYDROcodone-acetaminophen 5-325 MG per tablet   Commonly known as:  NORCO   Used for:  Insufficiency of right rotator cuff   Started by:  Michael Juarez MD        Dose:  1 tablet   Take 1 tablet by mouth every 4 hours as needed for pain   Quantity:  20 tablet   Refills:  0            Where to get your medicines      Some of these will need a paper prescription and others can be bought over the counter.  Ask your nurse if you have questions.     Bring a paper  prescription for each of these medications     HYDROcodone-acetaminophen 5-325 MG per tablet                Primary Care Provider Office Phone # Fax #    Denise Russ [phone] [address] [address]        Equal Access to Services     HARDIK PARIS : Hadii aad ku hadhueyo Soomaali, waaxda luqadaha, qaybta kaalmada adeegyada, waxkermit barringtonin hayaan adelorena sommer jenna goldberg. So [facility] [phone].    ATENCIÓN: Si habla español, tiene a redman disposición servicios gratuitos de asistencia lingüística. Llame al [phone].    We comply with applicable federal civil rights laws and [place] laws. We do not discriminate on the basis of race, color, national origin, age, disability, sex, sexual orientation, or gender identity.            Thank you!     Thank you for choosing [facility] ORTHOPA[facility] W[facility] IN CLINIC  for your care. Our goal is always to provide you with excellent care. Hearing back from our patients is one way we can continue to improve our services. Please take a few minutes to complete the written survey that you may receive in the mail after your visit with us. Thank you!             Your Updated Medication List - Protect others around you: Learn how to safely use, store and throw away your medicines at www.disposemymeds.org.          This list is accurate as of 7/25/18 11:59 PM.  Always use your most recent med list.                   Brand Name Dispense Instructions for use Diagnosis    albuterol 108 (90 Base) MCG/ACT Inhaler    PROAIR HFA/PROVENTIL HFA/VENTOLIN HFA     Inhale 2 puffs into the lungs every 6 hours        allopurinol 300 MG tablet    ZYLOPRIM    90 tablet    Take 1 tablet (300 mg) by mouth daily    Benign essential hypertension       amLODIPine 10 MG tablet    NORVASC    90 tablet    Take 1 tablet (10 mg) by mouth daily    Essential hypertension with goal blood pressure less than 130/80       AMOXICILLIN PO      Take 1,000 mg by mouth  daily        aspirin 81 MG tablet      Take by mouth daily    Hearing loss, unspecified laterality, Fatigue, Diabetes mellitus type 2, insulin dependent (H), Hyperlipidemia, COPD (chronic obstructive pulmonary disease) (H)       BIOTENE DRY MOUTH Pste      Apply 1 spray to affected area        blood glucose monitoring test strip    ACCU-CHEK MERY    200 each    Test 2 times daily    Diabetes mellitus (H)       budesonide-formoterol 80-4.5 MCG/ACT Inhaler    SYMBICORT    1 Inhaler    Inhale 2 puffs into the lungs 2 times daily    Moderate persistent asthma without complication       camphor-menthol 0.5-0.5 % Lotn    DERMASARRA    1 Bottle    Applied to skin 3 times day p.r.n. for itching    Itching       celecoxib 200 MG capsule    celeBREX    90 capsule    Take 1 capsule (200 mg) by mouth daily    Diabetes mellitus type 2, uncontrolled (H)       chlorthalidone 25 MG tablet    HYGROTON    45 tablet    Take one half tablet every morning.  Discontinue the hydrochlorothiazide 12.5 mg    Edema, unspecified type       cyclobenzaprine 5 MG tablet    FLEXERIL    42 tablet    Take one or two daily as needed for severe neck neck pain.    Neck pain       fluticasone 110 MCG/ACT Inhaler    FLOVENT HFA    1 Inhaler    Inhale 2 puffs into the lungs 2 times daily    Asthma       fluticasone 50 MCG/ACT spray    FLONASE     2 sprays by Both Nostrils route daily.    Diabetes mellitus type 2, uncontrolled (H), Hyperlipidemia       fluticasone-salmeterol 500-50 MCG/DOSE diskus inhaler    ADVAIR     Inhale 1 puff into the lungs every 12 hours        gabapentin 300 MG capsule    NEURONTIN    360 capsule    Take 2 capsules (600 mg) by mouth 2 times daily    Diabetic nephropathy (H)       glipiZIDE 10 MG tablet    GLUCOTROL    180 tablet    Take 1 tablet (10 mg) by mouth 2 times daily    Diabetes mellitus type 2, uncontrolled (H)       HYDROcodone-acetaminophen 5-325 MG per tablet    NORCO    20 tablet    Take 1 tablet by mouth every 4  hours as needed for pain    Insufficiency of right rotator cuff       indomethacin 50 MG capsule    INDOCIN    30 capsule    Take 1 capsule (50 mg) by mouth 3 times daily as needed Use only during a gout attack    Gout, unspecified       insulin glargine 100 UNIT/ML injection    LANTUS SOLOSTAR    60 mL    Inject 30 Units Subcutaneous 2 times daily    Diabetes mellitus type 2, uncontrolled (H)       insulin pen needle 31G X 8 MM    B-D U/F    200 each    Use twice daily or as directed. *3 month supply    Diabetes mellitus type 2, uncontrolled (H)       losartan 100 MG tablet    COZAAR    90 tablet    Take 1 tablet (100 mg) by mouth daily    Essential hypertension with goal blood pressure less than 130/80       metFORMIN modified 500 MG 24 hr tablet    GLUMETZA    450 tablet    Take 2 tablets every morning, 1 tablet at noon, and 2 tablets at bedtime.    Diabetes mellitus type 2, insulin dependent (H)       omeprazole 40 MG capsule    priLOSEC    180 capsule    Take 1 capsule (40 mg) by mouth 2 times daily    Esophageal reflux       saw palmetto 160 MG Caps      Take 160 mg by mouth daily        simethicone 125 MG Chew chewable tablet    MYLICON    30 tablet    Take 1 tablet (125 mg) by mouth 4 times daily as needed for intestinal gas        simvastatin 20 MG tablet    ZOCOR    90 tablet    Take 1 tablet (20 mg) by mouth At Bedtime    Hyperlipemia       solifenacin 5 MG tablet    VESICARE    90 tablet    Take 1 tablet (5 mg) by mouth daily    Urgency incontinence       tamsulosin 0.4 MG capsule    FLOMAX    30 capsule    Take 1 capsule (0.4 mg) by mouth daily    Benign prostatic hyperplasia with nocturia       tiotropium 18 MCG capsule    SPIRIVA HANDIHALER    30 capsule    Inhale contents of one capsule daily.    Intermittent asthma       Vitamin D-3 1000 units Caps      Take 1,000 Units by mouth daily

## 2018-07-25 NOTE — PATIENT INSTRUCTIONS
[facility] Medication Refill Request Information:  * Please contact your pharmacy regarding ANY request for medication refills.  ** [facility] Prescription Fax = [phone]  * Please allow 3 business days for routine medication refills.  * Please allow 5 business days for controlled substance medication refills.     [facility] Test Result notification information:  *You will be notified with in 7-10 days of your appointment day regarding the results of your test.  If you are on MyChart you will be notified as soon as the provider has reviewed the results and signed off on them.    [facility]: [phone]

## 2018-07-25 NOTE — LETTER
7/25/2018       RE: Rolando Gonzalez  [address]  [place] [address]     Dear Colleague,    Thank you for referring your patient, Rolando Gonzalez, to the [facility]TS AND ORTHOPAED[facility] WALK IN CLINIC at [facility]. Please see a copy of my visit note below.          SPORTS & ORTHOPEDIC WALK-IN VISIT 7/25/2018    Primary Care Physician: Dr. Russ    Anterior shoulder pain, getting worse over the last six months. Has gotten bad the last three weeks to a month. Had left rotator cuff surgery about 10 years ago. Feels similar to that problem. Took two Vicodin today and is using cannabis lotion.     Reason for visit:     What part of your body is injured / painful?  right shoulder    What caused the injury /pain? Unsure    How long ago did your injury occur or pain begin? several months ago    What are your most bothersome symptoms? Pain, clicking/popping, weakness    How would you characterize your symptom?  sharp    What makes your symptoms better? Other: cannabis lotion     What makes your symptoms worse? Movement    Have you been previously seen for this problem? Yes, IM    Medical History:    Any recent changes to your medical history? No    Any new medication prescribed since last visit? No     Have you had surgery on this body part before? No    Social History:    Occupation: retired    Handedness: Right    Exercise: None    Review of Systems:    Do you have fever, chills, weight loss? No    Do you have any vision problems? Yes, acting up a little bit, floaters    Do you have any chest pain or edema? No    Do you have any shortness of breath or wheezing?  No    Do you have stomach problems? No    Do you have any numbness or focal weakness? Yes, weakness in the arm    Do you have diabetes? Yes, type 2    Do you have problems with bleeding or clotting? No    Do you have an rashes or other skin lesions? No           [facility]  Orthopedics  Wilian Gonzalez MD  07/25/2018  "    Name: Rolando Gonzalez  MRN: [id number]  Age: 77 year old  : 1940  Referring provider: Michael Juarez     Chief Complaint:  Right shoulder pain      History of Present Illness:   Rolando Gonzalez is a 77 year old, right handed male who presents today for evaluation of right shoulder pain that has been present for 6 months with no inciting event. He voices it has been manageable but about a month ago began to worsen. He has a history of left rotator cuff repair that was a result of pushing hay bills, and this pain is similar to that and began around the same time.  his right shoulder has intermittently bothered him since. He describes the sharp pain to be in the anterior aspect of the shoulder and in the back \"when it pulls on the vertebrae\". He has exacerbated pains with certain movements and intermittently has clicking, popping, and weakness. He has been using a cannabis lotion with heat and states this has been somewhat helpful. He took Vicodin prior to presenting here today. No neck pain. No tingling, numbness, or radiation of pain into arm.     Review of Systems:   A 14-point review of systems was obtained and is negative except for as noted in the HPI.     Medications:     Current Outpatient Prescriptions:      albuterol (PROAIR HFA, PROVENTIL HFA, VENTOLIN HFA) 108 (90 BASE) MCG/ACT inhaler, Inhale 2 puffs into the lungs every 6 hours, Disp: , Rfl:      allopurinol (ZYLOPRIM) 300 MG tablet, Take 1 tablet (300 mg) by mouth daily, Disp: 90 tablet, Rfl: 0     amLODIPine (NORVASC) 10 MG tablet, Take 1 tablet (10 mg) by mouth daily, Disp: 90 tablet, Rfl: 3     AMOXICILLIN PO, Take 1,000 mg by mouth daily, Disp: , Rfl:      aspirin 81 MG tablet, Take by mouth daily, Disp: , Rfl:      blood glucose monitoring (ACCU-CHEK MERY) test strip, Test 2 times daily, Disp: 200 each, Rfl: 3     budesonide-formoterol (SYMBICORT) 80-4.5 MCG/ACT Inhaler, Inhale 2 puffs into the lungs 2 times daily, Disp: 1 Inhaler, Rfl: " 1     camphor-menthol (DERMASARRA) 0.5-0.5 % LOTN, Applied to skin 3 times day p.r.n. for itching, Disp: 1 Bottle, Rfl: 3     celecoxib (CELEBREX) 200 MG capsule, Take 1 capsule (200 mg) by mouth daily, Disp: 90 capsule, Rfl: 3     chlorthalidone (HYGROTON) 25 MG tablet, Take one half tablet every morning.  Discontinue the hydrochlorothiazide 12.5 mg, Disp: 45 tablet, Rfl: 3     Cholecalciferol (VITAMIN D-3) 1000 UNITS CAPS, Take 1,000 Units by mouth daily, Disp: , Rfl:      cyclobenzaprine (FLEXERIL) 5 MG tablet, Take one or two daily as needed for severe neck neck pain., Disp: 42 tablet, Rfl: 0     Dentifrices (BIOTENE DRY MOUTH) PSTE, Apply 1 spray to affected area, Disp: , Rfl:      fluticasone (FLONASE) 50 MCG/ACT nasal spray, 2 sprays by Both Nostrils route daily., Disp: , Rfl:      fluticasone (FLOVENT HFA) 110 MCG/ACT inhaler, Inhale 2 puffs into the lungs 2 times daily, Disp: 1 Inhaler, Rfl: 12     fluticasone-salmeterol (ADVAIR) 500-50 MCG/DOSE diskus inhaler, Inhale 1 puff into the lungs every 12 hours, Disp: , Rfl:      gabapentin (NEURONTIN) 300 MG capsule, Take 2 capsules (600 mg) by mouth 2 times daily, Disp: 360 capsule, Rfl: 3     glipiZIDE (GLUCOTROL) 10 MG tablet, Take 1 tablet (10 mg) by mouth 2 times daily, Disp: 180 tablet, Rfl: 1     HYDROcodone-acetaminophen (NORCO) 5-325 MG per tablet, Take 1 tablet by mouth every 4 hours as needed for pain, Disp: 20 tablet, Rfl: 0     indomethacin (INDOCIN) 50 MG capsule, Take 1 capsule (50 mg) by mouth 3 times daily as needed Use only during a gout attack, Disp: 30 capsule, Rfl: 0     insulin glargine (LANTUS SOLOSTAR) 100 UNIT/ML injection, Inject 30 Units Subcutaneous 2 times daily, Disp: 60 mL, Rfl: 1     insulin pen needle (B-D U/F) 31G X 8 MM, Use twice daily or as directed. *3 month supply, Disp: 200 each, Rfl: 3     losartan (COZAAR) 100 MG tablet, Take 1 tablet (100 mg) by mouth daily, Disp: 90 tablet, Rfl: 3     metFORMIN modified (GLUMETZA) 500  MG 24 hr tablet, Take 2 tablets every morning, 1 tablet at noon, and 2 tablets at bedtime., Disp: 450 tablet, Rfl: 3     omeprazole (PRILOSEC) 40 MG capsule, Take 1 capsule (40 mg) by mouth 2 times daily, Disp: 180 capsule, Rfl: 3     saw palmetto 160 MG CAPS, Take 160 mg by mouth daily, Disp: , Rfl:      simethicone (MYLICON) 125 MG CHEW chewable tablet, Take 1 tablet (125 mg) by mouth 4 times daily as needed for intestinal gas, Disp: 30 tablet, Rfl: 0     simvastatin (ZOCOR) 20 MG tablet, Take 1 tablet (20 mg) by mouth At Bedtime, Disp: 90 tablet, Rfl: 0     solifenacin (VESICARE) 5 MG tablet, Take 1 tablet (5 mg) by mouth daily, Disp: 90 tablet, Rfl: 2     tamsulosin (FLOMAX) 0.4 MG capsule, Take 1 capsule (0.4 mg) by mouth daily, Disp: 30 capsule, Rfl: 3     tiotropium (SPIRIVA HANDIHALER) 18 MCG inhalation capsule, Inhale contents of one capsule daily., Disp: 30 capsule, Rfl: 1    Allergies:  Morphine     Past Medical History:  Asthma  Degenerative tear of meniscus   Type II diabetes mellitus   Diabetic neuropathy   Diverticulosis   Edema extremities   GERD  Erectile dysfunction   Gout   Hyperlipidemia   Hypertension   Osteoarthritis shoulder right   RLL lung nodule   Urge incontinence     Past Surgical History:  L4-L5 infusion      Social History:  Presents with a male    Retired   He denies tobacco use and admits to mild alcohol use.    Family History:   Negative for bleeding or clotting disorders or adverse reactions to anesthesia.    Physical Examination:  Blood pressure 125/77, pulse 84, weight 96.6 kg (213 lb).  EXAM:  Alert, pleasant and conversational    Neck with full AROM, non-tender to midline cervical and upper thoracic spine palpation, negative Spurling's.  Elbow with FROM and non-tender to palpation      right shoulder:   Skin intact. No skin changes, deformity or atrophy    AROM: Symmetric without pain.     Strength testing: Abduction: 4+/5, with pain. External rotation: 5/5, Internal  rotation 5/5.  Deltoids 5/5, Biceps 5/5, Triceps 5/5,  5/5.    Palpation:  TTP in the kylah scapular area on the right.    negative TTP of the Acromioclavicular joint  negative TTP of Sternoclavicular joint  negative TTP of posterior glenoid  negative TTP of scapular borders  negative TTP of the bicipital tendon.     Special Tests:   Cross body adduction positive   positive  Escobedo test  positive  Neer's test.   positive Nemaha's test   positive  Speed's test.    Neurovascularly intact bilateral upper extremities.     Imaging:   four views of the right shoulder were obtained today and independently reviewed demonstrating significant osteoarthritis in the glenohumeral joint.  No acute fracture or dislocation.    I have independently reviewed the above imaging studies; the results were discussed with the patient.     Assessment:   77 year old, right handed male with right shoulder pain.     Plan:   After discussion the patient's condition and imaging, I recommended formal physical therapy and glenohumeral injections.  While physical therapy will not be able to fix the glenohumeral arthritis, and will likely lead to benefit of his periscapular pain, which at the current time is his most bothersome symptom.  He will schedule follow-up appointment if he would like to pursue glenohumeral joint injection.  All the patient's questions were answered and he elected to proceed with my recommendations.     Wilian Gonzalez MD      Scribe Disclosure:   I, Pito Ac, am serving as a scribe to document services personally performed by Wilian Gonzalez MD at this visit, based upon the provider's statements to me. All documentation has been reviewed by the aforementioned provider prior to being entered into the official medical record.     Portions of this medical record were completed by a scribe. UPON MY REVIEW AND AUTHENTICATION BY ELECTRONIC SIGNATURE, this confirms (a) I performed the applicable clinical services, and  (b) the record is accurate.

## 2018-07-25 NOTE — NURSING NOTE
Chief Complaint   Patient presents with     Pain     Pt is here to discus pain on the right shoulder.     Laurie Cisneros LPN at 11:19 AM on 7/25/2018.

## 2018-07-26 DIAGNOSIS — E11.9 DIABETES MELLITUS TYPE 2, INSULIN DEPENDENT (H): ICD-10-CM

## 2018-07-26 DIAGNOSIS — Z79.4 DIABETES MELLITUS TYPE 2, INSULIN DEPENDENT (H): ICD-10-CM

## 2018-07-26 DIAGNOSIS — R30.0 DYSURIA: ICD-10-CM

## 2018-07-26 LAB
ALBUMIN UR-MCNC: 30 MG/DL
APPEARANCE UR: CLEAR
BACTERIA #/AREA URNS HPF: ABNORMAL /HPF
BILIRUB UR QL STRIP: NEGATIVE
COLOR UR AUTO: YELLOW
GLUCOSE UR STRIP-MCNC: NEGATIVE MG/DL
HGB UR QL STRIP: NEGATIVE
HYALINE CASTS #/AREA URNS LPF: ABNORMAL /LPF
KETONES UR STRIP-MCNC: NEGATIVE MG/DL
LEUKOCYTE ESTERASE UR QL STRIP: NEGATIVE
NITRATE UR QL: NEGATIVE
PH UR STRIP: 5.5 PH (ref 5–7)
RBC #/AREA URNS AUTO: ABNORMAL /HPF
SOURCE: ABNORMAL
SP GR UR STRIP: 1.01 (ref 1–1.03)
UROBILINOGEN UR STRIP-ACNC: 0.2 EU/DL (ref 0.2–1)
WBC #/AREA URNS AUTO: ABNORMAL /HPF

## 2018-07-26 PROCEDURE — 81001 URINALYSIS AUTO W/SCOPE: CPT | Performed by: INTERNAL MEDICINE

## 2018-07-26 PROCEDURE — 82043 UR ALBUMIN QUANTITATIVE: CPT | Performed by: INTERNAL MEDICINE

## 2018-07-27 LAB
CREAT UR-MCNC: 125 MG/DL
MICROALBUMIN UR-MCNC: 104 MG/L
MICROALBUMIN/CREAT UR: 83.2 MG/G CR (ref 0–17)

## 2018-07-29 ENCOUNTER — OFFICE VISIT (OUTPATIENT)
Dept: ORTHOPEDICS | Facility: CLINIC | Age: 78
End: 2018-07-29
Payer: COMMERCIAL

## 2018-07-29 VITALS
SYSTOLIC BLOOD PRESSURE: 102 MMHG | WEIGHT: 213 LBS | HEART RATE: 93 BPM | BODY MASS INDEX: 32.87 KG/M2 | DIASTOLIC BLOOD PRESSURE: 65 MMHG

## 2018-07-29 DIAGNOSIS — M19.011 OSTEOARTHRITIS OF GLENOHUMERAL JOINT, RIGHT: Primary | ICD-10-CM

## 2018-07-29 RX ORDER — TRIAMCINOLONE ACETONIDE 40 MG/ML
40 INJECTION, SUSPENSION INTRA-ARTICULAR; INTRAMUSCULAR
Status: DISCONTINUED | OUTPATIENT
Start: 2018-07-29 | End: 2019-08-02

## 2018-07-29 RX ORDER — LIDOCAINE HYDROCHLORIDE 10 MG/ML
5 INJECTION, SOLUTION EPIDURAL; INFILTRATION; INTRACAUDAL; PERINEURAL
Status: DISCONTINUED | OUTPATIENT
Start: 2018-07-29 | End: 2019-08-02

## 2018-07-29 RX ADMIN — TRIAMCINOLONE ACETONIDE 40 MG: 40 INJECTION, SUSPENSION INTRA-ARTICULAR; INTRAMUSCULAR at 13:16

## 2018-07-29 RX ADMIN — LIDOCAINE HYDROCHLORIDE 5 ML: 10 INJECTION, SOLUTION EPIDURAL; INFILTRATION; INTRACAUDAL; PERINEURAL at 13:16

## 2018-07-29 NOTE — LETTER
7/29/2018       RE: Rolando Gonzalez  [address]  [place] [address]     Dear Colleague,    Thank you for referring your patient, Rolando Gonzalez, to the [facility] SPORTS AND ORTHOPAEDIC WALK IN CLINIC at [facility]. Please see a copy of my visit note below.          SPORTS & ORTHOPEDIC WALK-IN FOLLOW-UP VISIT 7/29/2018    Interval History:     Follow up reason: CSI of Right shoulder    Date of injury: denies injury, pain for about 6 months    Date last seen: 7/25/18    Following Therapeutic Plan: Yes     Pain: Unchanged    Function: Unchanged    Interval History: NA     Medical History:    Any recent changes to your medical history? No    Any new medication prescribed since last visit? No    Review of Systems:    Do you have fever, chills, weight loss? No    Do you have any vision problems? No    Do you have any chest pain or edema? No    Do you have any shortness of breath or wheezing?  No    Do you have stomach problems? No    Do you have any numbness or focal weakness? Yes, weakness in R arm    Do you have diabetes? Yes, type 2    Do you have problems with bleeding or clotting? No    Do you have an rashes or other skin lesions? No         Large Joint Injection/Arthocentesis  Date/Time: 7/29/2018 1:16 PM  Performed by: BRIAN CRUZ  Authorized by: BRIAN CRUZ     Indications:  Osteoarthritis  Needle Size:  22 G  Needle Size comment:  3 in spinal needle  Guidance: ultrasound    Approach:  Posterolateral  Location:  Shoulder  Site:  R glenohumeral joint  Medications:  5 mL lidocaine (PF) 1 %; 40 mg triamcinolone acetonide 40 MG/ML  Procedure discussed: discussed risks, benefits, and alternatives    Consent Given by:  Patient  Timeout: timeout called immediately prior to procedure    Prep: patient was prepped and draped in usual sterile fashion     Patient was positioned in the recumbent position on his left side.  The glenohumeral joint was  identified in the posterior lateral right shoulder with ultrasound.  Marked with a pen.  The area was cleansed with chlorhexidine.  Local anesthesia was obtained with 3 mL 1% lidocaine.  Using sterile technique the glenohumeral joint was again identified.  A 22-gauge spinal needle was introduced into the glenohumeral joint under direct ultrasound guidance.  The steroid lidocaine solution was injected and seen flowing into the joint space.  Images were captured and saved to the electronic medical record.  Patient tolerated the procedure well with no immediate complications.  Was instructed to apply ice to the area for the next 2 days and avoid strenuous activity.  Given routine postinjection instructions.  Follow-up with any severe increase in pain, swelling, redness, warmth from the joint.    Wilian Gonzalez MD

## 2018-07-29 NOTE — PROGRESS NOTES
SPORTS & ORTHOPEDIC WALK-IN FOLLOW-UP VISIT 7/29/2018    Interval History:     Follow up reason: CSI of Right shoulder    Date of injury: denies injury, pain for about 6 months    Date last seen: 7/25/18    Following Therapeutic Plan: Yes     Pain: Unchanged    Function: Unchanged    Interval History: NA     Medical History:    Any recent changes to your medical history? No    Any new medication prescribed since last visit? No    Review of Systems:    Do you have fever, chills, weight loss? No    Do you have any vision problems? No    Do you have any chest pain or edema? No    Do you have any shortness of breath or wheezing?  No    Do you have stomach problems? No    Do you have any numbness or focal weakness? Yes, weakness in R arm    Do you have diabetes? Yes, type 2    Do you have problems with bleeding or clotting? No    Do you have an rashes or other skin lesions? No         Large Joint Injection/Arthocentesis  Date/Time: 7/29/2018 1:16 PM  Performed by: BRIAN CRUZ  Authorized by: BRIAN CRUZ     Indications:  Osteoarthritis  Needle Size:  22 G  Needle Size comment:  3 in spinal needle  Guidance: ultrasound    Approach:  Posterolateral  Location:  Shoulder  Site:  R glenohumeral joint  Medications:  5 mL lidocaine (PF) 1 %; 40 mg triamcinolone acetonide 40 MG/ML  Procedure discussed: discussed risks, benefits, and alternatives    Consent Given by:  Patient  Timeout: timeout called immediately prior to procedure    Prep: patient was prepped and draped in usual sterile fashion     Patient was positioned in the recumbent position on his left side.  The glenohumeral joint was identified in the posterior lateral right shoulder with ultrasound.  Marked with a pen.  The area was cleansed with chlorhexidine.  Local anesthesia was obtained with 3 mL 1% lidocaine.  Using sterile technique the glenohumeral joint was again identified.  A 22-gauge spinal needle was introduced into the  glenohumeral joint under direct ultrasound guidance.  The steroid lidocaine solution was injected and seen flowing into the joint space.  Images were captured and saved to the electronic medical record.  Patient tolerated the procedure well with no immediate complications.  Was instructed to apply ice to the area for the next 2 days and avoid strenuous activity.  Given routine postinjection instructions.  Follow-up with any severe increase in pain, swelling, redness, warmth from the joint.    Wilian Gonzalez MD

## 2018-07-29 NOTE — PATIENT INSTRUCTIONS
Ice to shoulder today and then daily as needed  May use tylenol or ibuprofen as needed  No pools or hot tubs for 48 hours  May do Physical Therapy as tolerated  Follow up as needed  Return to clinic with severe pain, warmth from the joint, or redness, as these may be signs of infection after your injection.

## 2018-07-31 DIAGNOSIS — Z79.4 DIABETES MELLITUS TYPE 2, INSULIN DEPENDENT (H): ICD-10-CM

## 2018-07-31 DIAGNOSIS — I10 ESSENTIAL HYPERTENSION WITH GOAL BLOOD PRESSURE LESS THAN 130/80: ICD-10-CM

## 2018-07-31 DIAGNOSIS — E11.9 DIABETES MELLITUS TYPE 2, INSULIN DEPENDENT (H): ICD-10-CM

## 2018-07-31 NOTE — TELEPHONE ENCOUNTER
amLODIPine (NORVASC) 10 MG tablet      Last Written Prescription Date:  7/19/17  Last Fill Quantity: 90,   # refills: 3  Routing refill request to provider for review/approval because:  Failed protocol: Abnormal lab-Creatinine    metFORMIN modified (GLUMETZA) 500 MG 24 hr tablet      Last Written Prescription Date:  6/20/17  Last Fill Quantity: 450,   # refills: 3  Routing refill request to provider for review/approval because:  Failed protocol: Abnormal labs-Creatinine, GFR    Last Office Visit : 7/25/18  Future Office visit:  12/17/18

## 2018-08-01 RX ORDER — AMLODIPINE BESYLATE 10 MG/1
10 TABLET ORAL DAILY
Qty: 90 TABLET | Refills: 3 | Status: SHIPPED | OUTPATIENT
Start: 2018-08-01 | End: 2019-07-10

## 2018-08-01 RX ORDER — METFORMIN HYDROCHLORIDE 500 MG/1
TABLET, FILM COATED, EXTENDED RELEASE ORAL
Qty: 450 TABLET | Refills: 3 | Status: SHIPPED | OUTPATIENT
Start: 2018-08-01 | End: 2019-07-10

## 2018-08-09 DIAGNOSIS — I10 ESSENTIAL HYPERTENSION WITH GOAL BLOOD PRESSURE LESS THAN 130/80: ICD-10-CM

## 2018-08-09 DIAGNOSIS — E78.5 HYPERLIPEMIA: ICD-10-CM

## 2018-08-09 RX ORDER — SIMVASTATIN 20 MG
20 TABLET ORAL AT BEDTIME
Qty: 90 TABLET | Refills: 3 | Status: SHIPPED | OUTPATIENT
Start: 2018-08-09 | End: 2019-07-12

## 2018-08-09 NOTE — TELEPHONE ENCOUNTER
cozaar   Last Written Prescription Date:  7/31/17  Last Fill Quantity: 90,   # refills: 3  Last Office Visit : 7/25/18  Future Office visit:  12/17/18    Routing refill request to nurse for review/approval because:  Drug failed the [facility], [facility] or [facility] refill protocol

## 2018-08-10 RX ORDER — LOSARTAN POTASSIUM 100 MG/1
100 TABLET ORAL DAILY
Qty: 90 TABLET | Refills: 3 | Status: SHIPPED | OUTPATIENT
Start: 2018-08-10 | End: 2019-07-12

## 2018-09-04 ENCOUNTER — OFFICE VISIT (OUTPATIENT)
Dept: UROLOGY | Facility: CLINIC | Age: 78
End: 2018-09-04
Payer: COMMERCIAL

## 2018-09-04 VITALS
WEIGHT: 217.6 LBS | SYSTOLIC BLOOD PRESSURE: 110 MMHG | BODY MASS INDEX: 32.98 KG/M2 | DIASTOLIC BLOOD PRESSURE: 67 MMHG | HEIGHT: 68 IN | HEART RATE: 98 BPM

## 2018-09-04 DIAGNOSIS — R35.1 BENIGN PROSTATIC HYPERPLASIA WITH NOCTURIA: ICD-10-CM

## 2018-09-04 DIAGNOSIS — N40.1 BENIGN PROSTATIC HYPERPLASIA WITH NOCTURIA: ICD-10-CM

## 2018-09-04 RX ORDER — SOLIFENACIN SUCCINATE 5 MG/1
5 TABLET, FILM COATED ORAL DAILY
Qty: 90 TABLET | Refills: 3 | Status: SHIPPED | OUTPATIENT
Start: 2018-09-04 | End: 2019-06-17

## 2018-09-04 RX ORDER — TAMSULOSIN HYDROCHLORIDE 0.4 MG/1
0.4 CAPSULE ORAL DAILY
Qty: 90 CAPSULE | Refills: 3 | Status: SHIPPED | OUTPATIENT
Start: 2018-09-04 | End: 2019-07-12

## 2018-09-04 ASSESSMENT — PAIN SCALES - GENERAL: PAINLEVEL: NO PAIN (0)

## 2018-09-04 NOTE — MR AVS SNAPSHOT
After Visit Summary   9/4/2018    Rolando Gonzalez    MRN: [id number]           Patient Information     Date Of Birth          1940        Visit Information        Provider Department      9/4/2018 12:40 PM Haider Chen MD [facility] and [facility] [[email or web address]] for Prostate and Urologic Cancers        Today's Diagnoses     Benign prostatic hyperplasia with nocturia          Care Instructions    Follow up as needed.    It was a pleasure meeting with you today.  Thank you for allowing me and my team the privilege of caring for you today.  YOU are the reason we are here, and I truly hope we provided you with the excellent service you deserve.  Please let us know if there is anything else we can do for you so that we can be sure you are leaving completely satisfied with your care experience.                  Follow-ups after your visit        Your next 10 appointments already scheduled     Dec 17, 2018 10:25 AM CST   (Arrive by 10:10 AM)   Return Visit with Denise Russ MD   [facility] ([facility])    [address] [place] [id number] [phone]              Who to contact     Please call your clinic at [phone] to:    Ask questions about your health    Make or cancel appointments    Discuss your medicines    Learn about your test results    Speak to your doctor            Additional Information About Your Visit        Knownhart Information     Spreadsave gives you secure access to your electronic health record. If you see a primary care provider, you can also send messages to your care team and make appointments. If you have questions, please call your primary care clinic.  If you do not have a primary care provider, please call [phone] and they will assist you.      Spreadsave is an electronic gateway that provides easy, online access to your medical records. With Spreadsave, you can request a clinic appointment, read your test  "results, renew a prescription or communicate with your care team.     To access your existing account, please contact your [facility] or call [phone] for assistance.        Care EveryWhere ID     This is your Care EveryWhere ID. This could be used by other organizations to access your [facility] medical records  [id number]        Your Vitals Were     Pulse Height BMI (Body Mass Index)             98 1.715 m (5' 7.5\") 33.58 kg/m2          Blood Pressure from Last 3 Encounters:   09/04/18 110/67   07/29/18 102/65   07/25/18 125/77    Weight from Last 3 Encounters:   09/04/18 98.7 kg (217 lb 9.6 oz)   07/29/18 96.6 kg (213 lb)   07/25/18 96.6 kg (213 lb)              Today, you had the following     No orders found for display         Today's Medication Changes          These changes are accurate as of 9/4/18  1:04 PM.  If you have any questions, ask your nurse or doctor.               These medicines have changed or have updated prescriptions.        Dose/Directions    * solifenacin 5 MG tablet   Commonly known as:  VESICARE   This may have changed:  Another medication with the same name was added. Make sure you understand how and when to take each.   Used for:  Urgency incontinence   Changed by:  Haider Chen MD        Dose:  5 mg   Take 1 tablet (5 mg) by mouth daily   Quantity:  90 tablet   Refills:  2       * solifenacin 5 MG tablet   Commonly known as:  VESICARE   This may have changed:  You were already taking a medication with the same name, and this prescription was added. Make sure you understand how and when to take each.   Used for:  Benign prostatic hyperplasia with nocturia   Changed by:  Haider Chen MD        Dose:  5 mg   Take 1 tablet (5 mg) by mouth daily   Quantity:  90 tablet   Refills:  3       * Notice:  This list has 2 medication(s) that are the same as other medications prescribed for you. Read the directions carefully, and ask your doctor or " other care provider to review them with you.      Stop taking these medicines if you haven't already. Please contact your care team if you have questions.     fluticasone-salmeterol 500-50 MCG/DOSE diskus inhaler   Commonly known as:  ADVAIR   Stopped by:  Haider Chen MD                Where to get your medicines      These medications were sent to [facility] - [place], [place] - [address]  [address], [place] [address]     Phone:  [phone]     solifenacin 5 MG tablet    tamsulosin 0.4 MG capsule                Primary Care Provider Office Phone # Fax #    Denise Russ [phone]       [address] [address]        Equal Access to Services     HARDIK PARIS : Hadii villa herrera hadasho Soomaali, waaxda luqadaha, qaybta kaalmada adeegyada, waxkermit west . So [facility] [phone].    ATENCIÓN: Si habla español, tiene a redman disposición servicios gratuitos de asistencia lingüística. [facility] [phone].    We comply with applicable federal civil rights laws and [place] laws. We do not discriminate on the basis of race, color, national origin, age, disability, sex, sexual orientation, or gender identity.            Thank you!     Thank you for choosing [facility] UROLOGY AND [facility] FOR PROSTATE AND UROLOGIC CANCERS  for your care. Our goal is always to provide you with excellent care. Hearing back from our patients is one way we can continue to improve our services. Please take a few minutes to complete the written survey that you may receive in the mail after your visit with us. Thank you!             Your Updated Medication List - Protect others around you: Learn how to safely use, store and throw away your medicines at www.disposemymeds.org.          This list is accurate as of 9/4/18  1:04 PM.  Always use your most recent med list.                   Brand Name Dispense Instructions for use Diagnosis    albuterol 108 (90  Base) MCG/ACT inhaler    PROAIR HFA/PROVENTIL HFA/VENTOLIN HFA     Inhale 2 puffs into the lungs every 6 hours        allopurinol 300 MG tablet    ZYLOPRIM    90 tablet    Take 1 tablet (300 mg) by mouth daily    Benign essential hypertension       amLODIPine 10 MG tablet    NORVASC    90 tablet    Take 1 tablet (10 mg) by mouth daily    Essential hypertension with goal blood pressure less than 130/80       AMOXICILLIN PO      Take 1,000 mg by mouth daily        aspirin 81 MG tablet      Take by mouth daily    Hearing loss, unspecified laterality, Fatigue, Diabetes mellitus type 2, insulin dependent (H), Hyperlipidemia, COPD (chronic obstructive pulmonary disease) (H)       BIOTENE DRY MOUTH Pste      Apply 1 spray to affected area        blood glucose monitoring test strip    ACCU-CHEK MERY    200 each    Test 2 times daily    Diabetes mellitus (H)       budesonide-formoterol 80-4.5 MCG/ACT Inhaler    SYMBICORT    1 Inhaler    Inhale 2 puffs into the lungs 2 times daily    Moderate persistent asthma without complication       camphor-menthol 0.5-0.5 % Lotn    DERMASARRA    1 Bottle    Applied to skin 3 times day p.r.n. for itching    Itching       celecoxib 200 MG capsule    celeBREX    90 capsule    Take 1 capsule (200 mg) by mouth daily    Diabetes mellitus type 2, uncontrolled (H)       chlorthalidone 25 MG tablet    HYGROTON    45 tablet    Take one half tablet every morning.  Discontinue the hydrochlorothiazide 12.5 mg    Edema, unspecified type       cyclobenzaprine 5 MG tablet    FLEXERIL    42 tablet    Take one or two daily as needed for severe neck neck pain.    Neck pain       fluticasone 110 MCG/ACT Inhaler    FLOVENT HFA    1 Inhaler    Inhale 2 puffs into the lungs 2 times daily    Asthma       fluticasone 50 MCG/ACT spray    FLONASE     2 sprays by Both Nostrils route daily.    Diabetes mellitus type 2, uncontrolled (H), Hyperlipidemia       gabapentin 300 MG capsule    NEURONTIN    360 capsule     Take 2 capsules (600 mg) by mouth 2 times daily    Diabetic nephropathy (H)       glipiZIDE 10 MG tablet    GLUCOTROL    180 tablet    Take 1 tablet (10 mg) by mouth 2 times daily    Diabetes mellitus type 2, uncontrolled (H)       HYDROcodone-acetaminophen 5-325 MG per tablet    NORCO    20 tablet    Take 1 tablet by mouth every 4 hours as needed for pain    Insufficiency of right rotator cuff       indomethacin 50 MG capsule    INDOCIN    30 capsule    Take 1 capsule (50 mg) by mouth 3 times daily as needed Use only during a gout attack    Gout, unspecified       insulin glargine 100 UNIT/ML injection    LANTUS SOLOSTAR    60 mL    Inject 30 Units Subcutaneous 2 times daily    Diabetes mellitus type 2, uncontrolled (H)       insulin pen needle 31G X 8 MM    B-D U/F    200 each    Use twice daily or as directed. *3 month supply    Diabetes mellitus type 2, uncontrolled (H)       losartan 100 MG tablet    COZAAR    90 tablet    Take 1 tablet (100 mg) by mouth daily    Essential hypertension with goal blood pressure less than 130/80       metFORMIN modified 500 MG 24 hr tablet    GLUMETZA    450 tablet    Take 2 tablets every morning, 1 tablet at noon, and 2 tablets at bedtime.    Diabetes mellitus type 2, insulin dependent (H)       omeprazole 40 MG capsule    priLOSEC    180 capsule    Take 1 capsule (40 mg) by mouth 2 times daily    Esophageal reflux       saw palmetto 160 MG Caps      Take 160 mg by mouth daily        simethicone 125 MG Chew chewable tablet    MYLICON    30 tablet    Take 1 tablet (125 mg) by mouth 4 times daily as needed for intestinal gas        simvastatin 20 MG tablet    ZOCOR    90 tablet    Take 1 tablet (20 mg) by mouth At Bedtime    Hyperlipemia       * solifenacin 5 MG tablet    VESICARE    90 tablet    Take 1 tablet (5 mg) by mouth daily    Urgency incontinence       * solifenacin 5 MG tablet    VESICARE    90 tablet    Take 1 tablet (5 mg) by mouth daily    Benign prostatic hyperplasia  with nocturia       tamsulosin 0.4 MG capsule    FLOMAX    90 capsule    Take 1 capsule (0.4 mg) by mouth daily    Benign prostatic hyperplasia with nocturia       tiotropium 18 MCG capsule    SPIRIVA HANDIHALER    30 capsule    Inhale contents of one capsule daily.    Intermittent asthma       Vitamin D-3 1000 units Caps      Take 1,000 Units by mouth daily        * Notice:  This list has 2 medication(s) that are the same as other medications prescribed for you. Read the directions carefully, and ask your doctor or other care provider to review them with you.

## 2018-09-04 NOTE — LETTER
"9/4/2018     RE: Rolando Gonzalez  [address]  [place] [address]     Dear Colleague,    Thank you for referring your patient, Rolando Gonzalez, to the [facility] UROLOGY AND INST FOR PROSTATE AND UROLOGIC CANCERS at [facility]. Please see a copy of my visit note below.           UROLOGY FOLLOW-UP NOTE          Chief Complaint:   Today I had the pleasure of seeing Mr. Rolando Gonzalez in follow-up for a chief complaint of BPH/LUTS         Interval Update    Rolando Gonzalez is a very pleasant 77 year old male     Brief  History: At last visit 2 months ago he was complaining of bothersome urinary symptoms.  Was initially started on trial of flomax with plan to return to reassess symptoms and perform cystoscopy for prostate evaluation and procedural planning in the event no response to medications.    Today notes: he is delighted with the response to flomax.    AUA symptoms score went from 26 to 11  Bother went from 6 to 2    He would like to continue medical therapy for time being.    Denies new urinary symptoms, hematuria, dysuria or flank pain         Physical Exam:   Patient is a 77 year old  male   Vitals: Blood pressure 110/67, pulse 98, height 1.715 m (5' 7.5\"), weight 98.7 kg (217 lb 9.6 oz).      Labs and Pathology:    I personally reviewed all applicable laboratory data and went over findings with patient  Significant for:    CBC RESULTS:  Recent Labs   Lab Test  03/22/18   1229  12/20/16   1750  05/31/16   1906  03/10/15   1103  07/21/14   1102   WBC  11.8*   --   11.2*  14.6*  12.9*   HGB  13.0*  14.0  13.3  13.5  15.1   PLT  193   --   211  224  250        BMP RESULTS:  Recent Labs   Lab Test  06/26/18   1427  03/22/18   1229  11/07/17   1718  08/22/16   1633  05/31/16   1906   NA   --   142  138  140  136   POTASSIUM   --   3.6  3.9  4.4  4.5   CHLORIDE   --   111*  108  109  104   CO2   --   20  21  22  25   ANIONGAP   --   12  8  10  7   GLC   --   115*  161*  " 175*  306*   BUN   --   30  25  32*  22   CR  1.51*  1.66*  1.79*  1.69*  1.60*   GFRESTIMATED  45*  40*  37*  40*  42*   GFRESTBLACK  54*  49*  45*  48*  51*   BRITTANY   --   8.7  8.4*  9.1  9.4       UA RESULTS:   Recent Labs   Lab Test  07/26/18   1400  06/27/18   0630  03/22/18   1208   SG  1.015  1.020  1.015   URINEPH  5.5  5.5  5.5   NITRITE  Negative  Negative  Negative   RBCU  O - 2  O - 2  <1   WBCU  0 - 5  0 - 5  3       PSA RESULTS  PSA   Date Value Ref Range Status   11/07/2017 2.97 0 - 4 ug/L Final     Comment:     Assay Method:  Chemiluminescence using Siemens Vista analyzer   05/31/2016 3.46 0 - 4 ug/L Final   02/24/2010 1.78 0 - 4 ug/L Final   12/19/2007 1.34 0 - 4 ug/L Final   03/27/2006 1.89 0 - 4 ug/L Final              Assessment/Plan   77 year old male with BPH/LUTS  -Conitnue flomax  -RTC PRN                   Past Medical History:     Past Medical History:   Diagnosis Date     Asthma, moderate persistent      Degenerative tear of meniscus      Diabetes mellitus type 2, insulin dependent (H)      Diabetic neuropathy (H)     improved with gabapentin; normal EMG 1/26/18     Diverticulosis      ED (erectile dysfunction)      Edema extremities 2/15/16    venous insufficiency     GERD (gastroesophageal reflux disease)      Gout      Hyperlipidemia      Hypertension      Moderate persistent asthma 2017    MILLI Álvarez MD allergist.  FEV1 1.83. FEF 25-75 1.5 (3/13/17)     Osteoarthritis, shoulder     right     RLL lung nodule 03/20/2017    stable 8 mm rll nodule.  No additional CT testing needed.       Urge incontinence      Vasomotor rhinitis             Past Surgical History:     Past Surgical History:   Procedure Laterality Date     CATARACT IOL, RT/LT      both eyes     L4-5 fusion              Medications     Current Outpatient Prescriptions   Medication     solifenacin (VESICARE) 5 MG tablet     tamsulosin (FLOMAX) 0.4 MG capsule     albuterol (PROAIR HFA, PROVENTIL HFA, VENTOLIN HFA) 108 (90 BASE)  MCG/ACT inhaler     allopurinol (ZYLOPRIM) 300 MG tablet     amLODIPine (NORVASC) 10 MG tablet     AMOXICILLIN PO     aspirin 81 MG tablet     blood glucose monitoring (ACCU-CHEK MERY) test strip     budesonide-formoterol (SYMBICORT) 80-4.5 MCG/ACT Inhaler     camphor-menthol (DERMASARRA) 0.5-0.5 % LOTN     celecoxib (CELEBREX) 200 MG capsule     chlorthalidone (HYGROTON) 25 MG tablet     Cholecalciferol (VITAMIN D-3) 1000 UNITS CAPS     cyclobenzaprine (FLEXERIL) 5 MG tablet     Dentifrices (BIOTENE DRY MOUTH) PSTE     fluticasone (FLONASE) 50 MCG/ACT nasal spray     fluticasone (FLOVENT HFA) 110 MCG/ACT inhaler     gabapentin (NEURONTIN) 300 MG capsule     glipiZIDE (GLUCOTROL) 10 MG tablet     HYDROcodone-acetaminophen (NORCO) 5-325 MG per tablet     indomethacin (INDOCIN) 50 MG capsule     insulin glargine (LANTUS SOLOSTAR) 100 UNIT/ML injection     insulin pen needle (B-D U/F) 31G X 8 MM     losartan (COZAAR) 100 MG tablet     metFORMIN modified (GLUMETZA) 500 MG 24 hr tablet     omeprazole (PRILOSEC) 40 MG capsule     saw palmetto 160 MG CAPS     simethicone (MYLICON) 125 MG CHEW chewable tablet     simvastatin (ZOCOR) 20 MG tablet     solifenacin (VESICARE) 5 MG tablet     tiotropium (SPIRIVA HANDIHALER) 18 MCG inhalation capsule     Current Facility-Administered Medications   Medication     lidocaine (PF) (XYLOCAINE) 1 % injection 5 mL     triamcinolone acetonide (KENALOG-40) injection 40 mg            Family History:     Family History   Problem Relation Age of Onset     Cancer Paternal Grandmother             Social History:     Social History     Social History     Marital status: Single     Spouse name: N/A     Number of children: N/A     Years of education: N/A     Occupational History     [employer] business owner Retired     Social History Main Topics     Smoking status: Passive Smoke Exposure - Never Smoker     Last attempt to quit: 2/10/1982     Smokeless tobacco: Never Used     Alcohol use 0.5  oz/week     1 drink(s) per week     Drug use: No     Sexual activity: Yes     Partners: Female     Other Topics Concern     Exercise Yes     walks     Social History Narrative            Allergies:   Morphine hcl and Seasonal allergies         Review of Systems:  From intake questionnaire   Negative 14 system review except as noted on HPI, nurse's note.        I, Haider Chen saw and evaluated this patient and agree with the plan as stated above.  I personally performed all listed procedures.    CC:  Denise Russ    >15 minutes were spent face to face with patient over half of which was spent providing medical counseling regarding lower urinary tract symptoms    Again, thank you for allowing me to participate in the care of your patient.      Sincerely,    Haider Chen MD

## 2018-09-04 NOTE — PROGRESS NOTES
"       UROLOGY FOLLOW-UP NOTE          Chief Complaint:   Today I had the pleasure of seeing Mr. Rolando Gonzalez in follow-up for a chief complaint of BPH/LUTS         Interval Update    Rolando Gonzalez is a very pleasant 77 year old male     Brief  History: At last visit 2 months ago he was complaining of bothersome urinary symptoms.  Was initially started on trial of flomax with plan to return to reassess symptoms and perform cystoscopy for prostate evaluation and procedural planning in the event no response to medications.    Today notes: he is delighted with the response to flomax.    AUA symptoms score went from 26 to 11  Bother went from 6 to 2    He would like to continue medical therapy for time being.    Denies new urinary symptoms, hematuria, dysuria or flank pain         Physical Exam:   Patient is a 77 year old  male   Vitals: Blood pressure 110/67, pulse 98, height 1.715 m (5' 7.5\"), weight 98.7 kg (217 lb 9.6 oz).      Labs and Pathology:    I personally reviewed all applicable laboratory data and went over findings with patient  Significant for:    CBC RESULTS:  Recent Labs   Lab Test  03/22/18   1229  12/20/16   1750  05/31/16   1906  03/10/15   1103  07/21/14   1102   WBC  11.8*   --   11.2*  14.6*  12.9*   HGB  13.0*  14.0  13.3  13.5  15.1   PLT  193   --   211  224  250        BMP RESULTS:  Recent Labs   Lab Test  06/26/18   1427  03/22/18   1229  11/07/17   1718  08/22/16   1633  05/31/16   1906   NA   --   142  138  140  136   POTASSIUM   --   3.6  3.9  4.4  4.5   CHLORIDE   --   111*  108  109  104   CO2   --   20  21  22  25   ANIONGAP   --   12  8  10  7   GLC   --   115*  161*  175*  306*   BUN   --   30  25  32*  22   CR  1.51*  1.66*  1.79*  1.69*  1.60*   GFRESTIMATED  45*  40*  37*  40*  42*   GFRESTBLACK  54*  49*  45*  48*  51*   BRITTANY   --   8.7  8.4*  9.1  9.4       UA RESULTS:   Recent Labs   Lab Test  07/26/18   1400  06/27/18   0630  03/22/18   1208   SG  1.015  1.020  1.015 "   URINEPH  5.5  5.5  5.5   NITRITE  Negative  Negative  Negative   RBCU  O - 2  O - 2  <1   WBCU  0 - 5  0 - 5  3       PSA RESULTS  PSA   Date Value Ref Range Status   11/07/2017 2.97 0 - 4 ug/L Final     Comment:     Assay Method:  Chemiluminescence using Siemens Vista analyzer   05/31/2016 3.46 0 - 4 ug/L Final   02/24/2010 1.78 0 - 4 ug/L Final   12/19/2007 1.34 0 - 4 ug/L Final   03/27/2006 1.89 0 - 4 ug/L Final              Assessment/Plan   77 year old male with BPH/LUTS  -Conitnue flomax  -RTC PRN                   Past Medical History:     Past Medical History:   Diagnosis Date     Asthma, moderate persistent      Degenerative tear of meniscus      Diabetes mellitus type 2, insulin dependent (H)      Diabetic neuropathy (H)     improved with gabapentin; normal EMG 1/26/18     Diverticulosis      ED (erectile dysfunction)      Edema extremities 2/15/16    venous insufficiency     GERD (gastroesophageal reflux disease)      Gout      Hyperlipidemia      Hypertension      Moderate persistent asthma 2017    M MD Preet[facility] allergist.  FEV1 1.83. FEF 25-75 1.5 (3/13/17)     Osteoarthritis, shoulder     right     RLL lung nodule 03/20/2017    stable 8 mm rll nodule.  No additional CT testing needed.       Urge incontinence      Vasomotor rhinitis             Past Surgical History:     Past Surgical History:   Procedure Laterality Date     CATARACT IOL, RT/LT      both eyes     L4-5 fusion              Medications     Current Outpatient Prescriptions   Medication     solifenacin (VESICARE) 5 MG tablet     tamsulosin (FLOMAX) 0.4 MG capsule     albuterol (PROAIR HFA, PROVENTIL HFA, VENTOLIN HFA) 108 (90 BASE) MCG/ACT inhaler     allopurinol (ZYLOPRIM) 300 MG tablet     amLODIPine (NORVASC) 10 MG tablet     AMOXICILLIN PO     aspirin 81 MG tablet     blood glucose monitoring (ACCU-CHEK MERY) test strip     budesonide-formoterol (SYMBICORT) 80-4.5 MCG/ACT Inhaler     camphor-menthol (DERMASARRA) 0.5-0.5 % LOTN      celecoxib (CELEBREX) 200 MG capsule     chlorthalidone (HYGROTON) 25 MG tablet     Cholecalciferol (VITAMIN D-3) 1000 UNITS CAPS     cyclobenzaprine (FLEXERIL) 5 MG tablet     Dentifrices (BIOTENE DRY MOUTH) PSTE     fluticasone (FLONASE) 50 MCG/ACT nasal spray     fluticasone (FLOVENT HFA) 110 MCG/ACT inhaler     gabapentin (NEURONTIN) 300 MG capsule     glipiZIDE (GLUCOTROL) 10 MG tablet     HYDROcodone-acetaminophen (NORCO) 5-325 MG per tablet     indomethacin (INDOCIN) 50 MG capsule     insulin glargine (LANTUS SOLOSTAR) 100 UNIT/ML injection     insulin pen needle (B-D U/F) 31G X 8 MM     losartan (COZAAR) 100 MG tablet     metFORMIN modified (GLUMETZA) 500 MG 24 hr tablet     omeprazole (PRILOSEC) 40 MG capsule     saw palmetto 160 MG CAPS     simethicone (MYLICON) 125 MG CHEW chewable tablet     simvastatin (ZOCOR) 20 MG tablet     solifenacin (VESICARE) 5 MG tablet     tiotropium (SPIRIVA HANDIHALER) 18 MCG inhalation capsule     Current Facility-Administered Medications   Medication     lidocaine (PF) (XYLOCAINE) 1 % injection 5 mL     triamcinolone acetonide (KENALOG-40) injection 40 mg            Family History:     Family History   Problem Relation Age of Onset     Cancer Paternal Grandmother             Social History:     Social History     Social History     Marital status: Single     Spouse name: N/A     Number of children: N/A     Years of education: N/A     Occupational History     [place]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        I, Haider Chen saw and evaluated this patient and agree with the plan as stated above.  I personally performed all listed procedures.    CC:  Denise Russ      >15 minutes were spent face to face with patient over half of which was spent providing medical counseling regarding lower urinary tract symptoms

## 2018-09-04 NOTE — NURSING NOTE
Chief Complaint   Patient presents with     Cystoscopy     BPH       There were no vitals taken for this visit. There is no height or weight on file to calculate BMI.    Patient Active Problem List   Diagnosis     Hyperlipidemia     Diabetes mellitus type 2, insulin dependent (H)     Hypertension     Diverticulosis     ED (erectile dysfunction)     Diabetic nephropathy (H)     COPD (chronic obstructive pulmonary disease) (H)     Asthma exacerbation     Gout     Decreased GFR     Claudication (H), left leg, secondary to spinal stenosis       Allergies   Allergen Reactions     Morphine Hcl Other (See Comments)     Sweating      Seasonal Allergies Difficulty breathing     Sinus congestion, sneezing       Current Outpatient Prescriptions   Medication Sig Dispense Refill     albuterol (PROAIR HFA, PROVENTIL HFA, VENTOLIN HFA) 108 (90 BASE) MCG/ACT inhaler Inhale 2 puffs into the lungs every 6 hours       allopurinol (ZYLOPRIM) 300 MG tablet Take 1 tablet (300 mg) by mouth daily 90 tablet 0     amLODIPine (NORVASC) 10 MG tablet Take 1 tablet (10 mg) by mouth daily 90 tablet 3     AMOXICILLIN PO Take 1,000 mg by mouth daily       aspirin 81 MG tablet Take by mouth daily       blood glucose monitoring (ACCU-CHEK MERY) test strip Test 2 times daily 200 each 3     budesonide-formoterol (SYMBICORT) 80-4.5 MCG/ACT Inhaler Inhale 2 puffs into the lungs 2 times daily 1 Inhaler 1     camphor-menthol (DERMASARRA) 0.5-0.5 % LOTN Applied to skin 3 times day p.r.n. for itching 1 Bottle 3     celecoxib (CELEBREX) 200 MG capsule Take 1 capsule (200 mg) by mouth daily 90 capsule 3     chlorthalidone (HYGROTON) 25 MG tablet Take one half tablet every morning.    Discontinue the hydrochlorothiazide 12.5 mg 45 tablet 3     Cholecalciferol (VITAMIN D-3) 1000 UNITS CAPS Take 1,000 Units by mouth daily       cyclobenzaprine (FLEXERIL) 5 MG tablet Take one or two daily as needed for severe neck neck pain. 42 tablet 0     Dentifrices (BIOTENE  DRY MOUTH) PSTE Apply 1 spray to affected area       fluticasone (FLONASE) 50 MCG/ACT nasal spray 2 sprays by Both Nostrils route daily.       fluticasone (FLOVENT HFA) 110 MCG/ACT inhaler Inhale 2 puffs into the lungs 2 times daily 1 Inhaler 12     fluticasone-salmeterol (ADVAIR) 500-50 MCG/DOSE diskus inhaler Inhale 1 puff into the lungs every 12 hours       gabapentin (NEURONTIN) 300 MG capsule Take 2 capsules (600 mg) by mouth 2 times daily 360 capsule 3     glipiZIDE (GLUCOTROL) 10 MG tablet Take 1 tablet (10 mg) by mouth 2 times daily 180 tablet 1     HYDROcodone-acetaminophen (NORCO) 5-325 MG per tablet Take 1 tablet by mouth every 4 hours as needed for pain 20 tablet 0     indomethacin (INDOCIN) 50 MG capsule Take 1 capsule (50 mg) by mouth 3 times daily as needed Use only during a gout attack 30 capsule 0     insulin glargine (LANTUS SOLOSTAR) 100 UNIT/ML injection Inject 30 Units Subcutaneous 2 times daily 60 mL 1     insulin pen needle (B-D U/F) 31G X 8 MM Use twice daily or as directed. *3 month supply 200 each 3     losartan (COZAAR) 100 MG tablet Take 1 tablet (100 mg) by mouth daily 90 tablet 3     metFORMIN modified (GLUMETZA) 500 MG 24 hr tablet Take 2 tablets every morning, 1 tablet at noon, and 2 tablets at bedtime. 450 tablet 3     omeprazole (PRILOSEC) 40 MG capsule Take 1 capsule (40 mg) by mouth 2 times daily 180 capsule 3     saw palmetto 160 MG CAPS Take 160 mg by mouth daily       simethicone (MYLICON) 125 MG CHEW chewable tablet Take 1 tablet (125 mg) by mouth 4 times daily as needed for intestinal gas 30 tablet 0     simvastatin (ZOCOR) 20 MG tablet Take 1 tablet (20 mg) by mouth At Bedtime 90 tablet 3     solifenacin (VESICARE) 5 MG tablet Take 1 tablet (5 mg) by mouth daily 90 tablet 2     tamsulosin (FLOMAX) 0.4 MG capsule Take 1 capsule (0.4 mg) by mouth daily 30 capsule 3     tiotropium (SPIRIVA HANDIHALER) 18 MCG inhalation capsule Inhale contents of one capsule daily. 30 capsule 1        Social History   Substance Use Topics     Smoking status: Passive Smoke Exposure - Never Smoker     Last attempt to quit: 2/10/1982     Smokeless tobacco: Never Used     Alcohol use 0.5 oz/week     1 drink(s) per week       VANNA Stinson  2018  12:37 PM     Invasive Procedure Safety Checklist:    Procedure:     Action: Complete sections and checkboxes as appropriate.    Pre-procedure:  1. Patient ID Verified with 2 identifiers (Krista and  or MRN) : YES    2. Procedure and site verified with patient/designee (when able) : YES    3. Accurate consent documentation in medical record : YES    4. H&P (or appropriate assessment) documented in medical record : YES  H&P must be up to 30 days prior to procedure an updated within 24 hours of                 Procedure as applicable.     5. Relevant diagnostic and radiology test results appropriately labeled and displayed as applicable : YES    6. Blood products, implants, devices, and/or special equipment available for the procedure as applicable : YES    7. Procedure site(s) marked with provider initials [Exclusions: NOne] : NO    8. Marking not required. Reason : Yes  Procedure does not require site marking    Time Out:     Time-Out performed immediately prior to starting procedure, including verbal and active participation of all team members addressing: YES    1. Correct patient identity.  2. Confirmed that the correct side and site are marked.  3. An accurate procedure to be done.  4. Agreement on the procedure to be done.  5. Correct patient position.  6. Relevant images and results are properly labeled and appropriately displayed.  7. The need to administer antibiotics or fluids for irrigation purposes during the procedure as applicable.  8. Safety precautions based on patient history or medication use.    During Procedure: Verification of correct person, site, and procedure occurs any time the responsibility for care of the patient is transferred to  another member of the care team.    The following medication was given:     MEDICATION:  Lidocaine 2% jelly  ROUTE: topical  SITE: urethra via the meatus  DOSE: 10 mL  LOT #: [id number]  : IMS, Limited  EXPIRATION DATE: 04/20  NDC#: [address]-8   Was there drug waste? No  Multi-dose vial: No    Shelby Sorensen CMA  September 4, 2018

## 2018-09-06 ENCOUNTER — TRANSFERRED RECORDS (OUTPATIENT)
Dept: HEALTH INFORMATION MANAGEMENT | Facility: CLINIC | Age: 78
End: 2018-09-06

## 2018-09-10 DIAGNOSIS — I10 BENIGN ESSENTIAL HYPERTENSION: ICD-10-CM

## 2018-09-11 RX ORDER — ALLOPURINOL 300 MG/1
300 TABLET ORAL DAILY
Qty: 90 TABLET | Refills: 0 | Status: SHIPPED | OUTPATIENT
Start: 2018-09-11 | End: 2018-12-31

## 2018-09-13 ENCOUNTER — TRANSFERRED RECORDS (OUTPATIENT)
Dept: HEALTH INFORMATION MANAGEMENT | Facility: CLINIC | Age: 78
End: 2018-09-13

## 2018-09-15 DIAGNOSIS — R60.9 EDEMA, UNSPECIFIED TYPE: ICD-10-CM

## 2018-09-18 RX ORDER — CHLORTHALIDONE 25 MG/1
TABLET ORAL
Qty: 45 TABLET | Refills: 0 | Status: SHIPPED | OUTPATIENT
Start: 2018-09-18 | End: 2018-12-14

## 2018-09-18 NOTE — TELEPHONE ENCOUNTER
chlorthalidone (HYGROTON) 25 MG tablet  Last Written Prescription Date:  5/18/17  Last Fill Quantity: 45,   # refills: 3  Last Office Visit : 7/25/18  Future Office visit:  12/17/18    Routing  Because:  Creat HIGH.

## 2018-09-21 NOTE — TELEPHONE ENCOUNTER
glipiZIDE (GLUCOTROL) 10 MG tablet  Last Written Prescription Date:  1/25/18  Last Fill Quantity: 180,   # refills: 1  Last Office Visit : 7/25/18  Future Office visit: 12/17/18    Routing  Because: creat HIGH.

## 2018-09-24 RX ORDER — GLIPIZIDE 10 MG/1
10 TABLET ORAL 2 TIMES DAILY
Qty: 180 TABLET | Refills: 0 | Status: SHIPPED | OUTPATIENT
Start: 2018-09-24 | End: 2018-12-14

## 2018-10-17 DIAGNOSIS — K21.9 ESOPHAGEAL REFLUX: ICD-10-CM

## 2018-10-19 RX ORDER — OMEPRAZOLE 40 MG/1
40 CAPSULE, DELAYED RELEASE ORAL 2 TIMES DAILY
Qty: 180 CAPSULE | Refills: 3 | Status: SHIPPED | OUTPATIENT
Start: 2018-10-19

## 2018-11-15 DIAGNOSIS — E11.21 DIABETIC NEPHROPATHY (H): ICD-10-CM

## 2018-11-16 NOTE — TELEPHONE ENCOUNTER
gabapentin (NEURONTIN) 300 MG capsule           Last Written Prescription Date:  8-4-17  Last Fill Quantity: 360,   # refills: 3  Last Office Visit : 7-25-18  Future Office visit:  12-17-18    Routing refill request to provider for review/approval because:  Drug not on the [facility], P or [facility] refill protocol.

## 2018-11-19 RX ORDER — GABAPENTIN 300 MG/1
600 CAPSULE ORAL 2 TIMES DAILY
Qty: 360 CAPSULE | Refills: 2 | Status: SHIPPED | OUTPATIENT
Start: 2018-11-19 | End: 2019-06-13

## 2018-11-29 ENCOUNTER — TRANSFERRED RECORDS (OUTPATIENT)
Dept: HEALTH INFORMATION MANAGEMENT | Facility: CLINIC | Age: 78
End: 2018-11-29

## 2018-12-13 DIAGNOSIS — R60.9 EDEMA, UNSPECIFIED TYPE: ICD-10-CM

## 2018-12-14 NOTE — TELEPHONE ENCOUNTER
chlorthalidone (HYGROTON) 25 MG tablet      Last Written Prescription Date:  9/18/18  Last Fill Quantity: 45,   # refills: 0     glipiZIDE (GLUCOTROL) 10 MG tablet      Last Written Prescription Date:  9/24/18  Last Fill Quantity: 180,   # refills: 0      Last Office Visit : 7/25/18  Future Office visit:  12/17/18  Routing refill request to provider for review/approval because:  Both med's failed protocol: Abnormal lab-Creatinine

## 2018-12-17 ENCOUNTER — OFFICE VISIT (OUTPATIENT)
Dept: INTERNAL MEDICINE | Facility: CLINIC | Age: 78
End: 2018-12-17
Payer: COMMERCIAL

## 2018-12-17 VITALS
SYSTOLIC BLOOD PRESSURE: 102 MMHG | BODY MASS INDEX: 32.98 KG/M2 | WEIGHT: 213.7 LBS | DIASTOLIC BLOOD PRESSURE: 67 MMHG | OXYGEN SATURATION: 96 % | HEART RATE: 95 BPM

## 2018-12-17 DIAGNOSIS — Z13.29 SCREENING FOR HYPOTHYROIDISM: ICD-10-CM

## 2018-12-17 DIAGNOSIS — Z13.5 SCREENING FOR DIABETIC RETINOPATHY: ICD-10-CM

## 2018-12-17 DIAGNOSIS — Z79.4 TYPE 2 DIABETES MELLITUS WITH DIABETIC POLYNEUROPATHY, WITH LONG-TERM CURRENT USE OF INSULIN (H): ICD-10-CM

## 2018-12-17 DIAGNOSIS — E11.42 TYPE 2 DIABETES MELLITUS WITH DIABETIC POLYNEUROPATHY, WITH LONG-TERM CURRENT USE OF INSULIN (H): ICD-10-CM

## 2018-12-17 DIAGNOSIS — E11.42 TYPE 2 DIABETES MELLITUS WITH DIABETIC POLYNEUROPATHY, WITH LONG-TERM CURRENT USE OF INSULIN (H): Primary | ICD-10-CM

## 2018-12-17 DIAGNOSIS — Z13.89 SCREENING FOR DIABETIC PERIPHERAL NEUROPATHY: ICD-10-CM

## 2018-12-17 DIAGNOSIS — Z79.4 TYPE 2 DIABETES MELLITUS WITH DIABETIC POLYNEUROPATHY, WITH LONG-TERM CURRENT USE OF INSULIN (H): Primary | ICD-10-CM

## 2018-12-17 DIAGNOSIS — R29.898 WEAKNESS OF BOTH LOWER EXTREMITIES: ICD-10-CM

## 2018-12-17 LAB
BASOPHILS # BLD AUTO: 0.1 10E9/L (ref 0–0.2)
BASOPHILS NFR BLD AUTO: 0.6 %
CK SERPL-CCNC: 64 U/L (ref 30–300)
CRP SERPL-MCNC: <2.9 MG/L (ref 0–8)
DIFFERENTIAL METHOD BLD: ABNORMAL
EOSINOPHIL # BLD AUTO: 0.2 10E9/L (ref 0–0.7)
EOSINOPHIL NFR BLD AUTO: 1.8 %
ERYTHROCYTE [DISTWIDTH] IN BLOOD BY AUTOMATED COUNT: 13 % (ref 10–15)
ERYTHROCYTE [SEDIMENTATION RATE] IN BLOOD BY WESTERGREN METHOD: 35 MM/H (ref 0–20)
HBA1C MFR BLD: 8.6 % (ref 0–5.6)
HCT VFR BLD AUTO: 41.2 % (ref 40–53)
HGB BLD-MCNC: 14.3 G/DL (ref 13.3–17.7)
IMM GRANULOCYTES # BLD: 0.1 10E9/L (ref 0–0.4)
IMM GRANULOCYTES NFR BLD: 0.6 %
LYMPHOCYTES # BLD AUTO: 3.1 10E9/L (ref 0.8–5.3)
LYMPHOCYTES NFR BLD AUTO: 25.8 %
MCH RBC QN AUTO: 31.3 PG (ref 26.5–33)
MCHC RBC AUTO-ENTMCNC: 34.7 G/DL (ref 31.5–36.5)
MCV RBC AUTO: 90 FL (ref 78–100)
MONOCYTES # BLD AUTO: 0.9 10E9/L (ref 0–1.3)
MONOCYTES NFR BLD AUTO: 7.1 %
NEUTROPHILS # BLD AUTO: 7.6 10E9/L (ref 1.6–8.3)
NEUTROPHILS NFR BLD AUTO: 64.1 %
NRBC # BLD AUTO: 0 10*3/UL
NRBC BLD AUTO-RTO: 0 /100
PLATELET # BLD AUTO: 254 10E9/L (ref 150–450)
RBC # BLD AUTO: 4.57 10E12/L (ref 4.4–5.9)
TSH SERPL DL<=0.005 MIU/L-ACNC: 1.93 MU/L (ref 0.4–4)
WBC # BLD AUTO: 11.9 10E9/L (ref 4–11)

## 2018-12-17 RX ORDER — GLIPIZIDE 10 MG/1
10 TABLET ORAL 2 TIMES DAILY
Qty: 180 TABLET | Refills: 0 | Status: SHIPPED | OUTPATIENT
Start: 2018-12-17 | End: 2019-04-09

## 2018-12-17 RX ORDER — CHLORTHALIDONE 25 MG/1
TABLET ORAL
Qty: 45 TABLET | Refills: 0 | Status: SHIPPED | OUTPATIENT
Start: 2018-12-17 | End: 2019-01-30

## 2018-12-17 ASSESSMENT — PAIN SCALES - GENERAL: PAINLEVEL: NO PAIN (0)

## 2018-12-17 NOTE — PATIENT INSTRUCTIONS
[facility] Medication Refill Request Information:  * Please contact your pharmacy regarding ANY request for medication refills.  ** [facility] Prescription Fax = [phone]  * Please allow 3 business days for routine medication refills.  * Please allow 5 business days for controlled substance medication refills.     [facility] Test Result notification information:  *You will be notified with in 7-10 days of your appointment day regarding the results of your test.  If you are on MyChart you will be notified as soon as the provider has reviewed the results and signed off on them.    [facility]: [phone]

## 2018-12-17 NOTE — NURSING NOTE
Chief Complaint   Patient presents with     Recheck Medication     here for 6 month check up     Memory Loss     hasn't gotten better per pt       Ronak Grimm, EMT 10:16 AM on 12/17/2018.

## 2018-12-17 NOTE — PROGRESS NOTES
"[facility]  [facility] [place]   Denise Russ MD  12/17/2018      Chief Complaint:   Recheck Medication and Memory Loss       History of Present Illness:   Rolando Gonzalez is a 78 year old male with a history of hypertension, diabetes mellitus type 2, and gastroesophageal reflux disease who presents with his friend Naseem for recheck of medication and evaluation of memory loss.    Memory loss:  Naseem notes that his memory is slowing getting worse. In the last few months Nsaeem has noticed he gets side tracked on conversations.  I told him that I had reviewed his chart and that these similar memory changes go back to at least 2014 when he was seeing another physician.  At that time he had a CT scan of his head which was negative.  He is not really worried about his memory, but finds it occasionally bothersome.  We have been unable to do a Jose Alberto cognitive assessment test as he does not like these tests.    Leg pains : He notes that he has been sore when he wakes up in the morning and has been taking Excedrin to help alleviate this pain. Additionally, Naseem notes that if he walks through a parking lot, his legs will get weak and lock up. Naseem describes this as \"Bert Duck feet\" as his feet will begin to slap the floor and not go heel to toe.      BPH with lower urinary tract obstruction and urgency: He had a urology visit 3 months ago and was given medication at that time which he notes has helped with managing his urgency.     Diabetes mellitus type II: His last A1c on 7/25/28 was 6.0. Naseem notes his blood sugars typically range from . Every once and a while he had a BG reading of 160 or 225 and the lowest reading has been 90.      Asthma, moderately severe: He had a recent asthma flare and was treated with prednisone and amoxicillin. He believes he has recovered from this. He denies a fever. He denies chest pain.Towards the end of his recovery, he noticed and increase in his sleep.     Other concerns " discussed:  1. Blood pressure is well controlled     Review of Systems:   Pertinent items are noted in HPI, remainder of complete ROS is negative.      Active Medications:      albuterol (PROAIR HFA, PROVENTIL HFA, VENTOLIN HFA) 108 (90 BASE) MCG/ACT inhaler, Inhale 2 puffs into the lungs every 6 hours, Disp: , Rfl:      allopurinol (ZYLOPRIM) 300 MG tablet, Take 1 tablet (300 mg) by mouth daily, Disp: 90 tablet, Rfl: 0     amLODIPine (NORVASC) 10 MG tablet, Take 1 tablet (10 mg) by mouth daily, Disp: 90 tablet, Rfl: 3     AMOXICILLIN PO, Take 1,000 mg by mouth daily, Disp: , Rfl:      aspirin 81 MG tablet, Take by mouth daily, Disp: , Rfl:      budesonide-formoterol (SYMBICORT) 80-4.5 MCG/ACT Inhaler, Inhale 2 puffs into the lungs 2 times daily, Disp: 1 Inhaler, Rfl: 1     camphor-menthol (DERMASARRA) 0.5-0.5 % LOTN, Applied to skin 3 times day p.r.n. for itching, Disp: 1 Bottle, Rfl: 3     celecoxib (CELEBREX) 200 MG capsule, Take 1 capsule (200 mg) by mouth daily, Disp: 90 capsule, Rfl: 3     chlorthalidone (HYGROTON) 25 MG tablet, Take one half tablet every morning.Discontinue the hydrochlorothiazide 12.5 mg, Disp: 45 tablet, Rfl: 0     Cholecalciferol (VITAMIN D-3) 1000 UNITS CAPS, Take 1,000 Units by mouth daily, Disp: , Rfl:      cyclobenzaprine (FLEXERIL) 5 MG tablet, Take one or two daily as needed for severe neck neck pain., Disp: 42 tablet, Rfl: 0     Dentifrices (BIOTENE DRY MOUTH) PSTE, Apply 1 spray to affected area, Disp: , Rfl:      fluticasone (FLONASE) 50 MCG/ACT nasal spray, 2 sprays by Both Nostrils route daily., Disp: , Rfl:      fluticasone (FLOVENT HFA) 110 MCG/ACT inhaler, Inhale 2 puffs into the lungs 2 times daily, Disp: 1 Inhaler, Rfl: 12     gabapentin (NEURONTIN) 300 MG capsule, Take 2 capsules (600 mg) by mouth 2 times daily, Disp: 360 capsule, Rfl: 2     glipiZIDE (GLUCOTROL) 10 MG tablet, Take 1 tablet (10 mg) by mouth 2 times daily, Disp: 180 tablet, Rfl: 0      HYDROcodone-acetaminophen (NORCO) 5-325 MG per tablet, Take 1 tablet by mouth every 4 hours as needed for pain, Disp: 20 tablet, Rfl: 0     indomethacin (INDOCIN) 50 MG capsule, Take 1 capsule (50 mg) by mouth 3 times daily as needed Use only during a gout attack, Disp: 30 capsule, Rfl: 0     insulin glargine (LANTUS SOLOSTAR) 100 UNIT/ML injection, Inject 30 Units Subcutaneous 2 times daily, Disp: 60 mL, Rfl: 1     losartan (COZAAR) 100 MG tablet, Take 1 tablet (100 mg) by mouth daily, Disp: 90 tablet, Rfl: 3     metFORMIN modified (GLUMETZA) 500 MG 24 hr tablet, Take 2 tablets every morning, 1 tablet at noon, and 2 tablets at bedtime., Disp: 450 tablet, Rfl: 3     omeprazole (PRILOSEC) 40 MG capsule, Take 1 capsule (40 mg) by mouth 2 times daily, Disp: 180 capsule, Rfl: 3     saw palmetto 160 MG CAPS, Take 160 mg by mouth daily, Disp: , Rfl:      simethicone (MYLICON) 125 MG CHEW chewable tablet, Take 1 tablet (125 mg) by mouth 4 times daily as needed for intestinal gas, Disp: 30 tablet, Rfl: 0     simvastatin (ZOCOR) 20 MG tablet, Take 1 tablet (20 mg) by mouth At Bedtime, Disp: 90 tablet, Rfl: 3     solifenacin (VESICARE) 5 MG tablet, Take 1 tablet (5 mg) by mouth daily, Disp: 90 tablet, Rfl: 3     solifenacin (VESICARE) 5 MG tablet, Take 1 tablet (5 mg) by mouth daily, Disp: 90 tablet, Rfl: 2     tamsulosin (FLOMAX) 0.4 MG capsule, Take 1 capsule (0.4 mg) by mouth daily, Disp: 90 capsule, Rfl: 3     tiotropium (SPIRIVA HANDIHALER) 18 MCG inhalation capsule, Inhale contents of one capsule daily., Disp: 30 capsule, Rfl: 1    Allergies:   Morphine hcl   Seasonal allergies      Past Medical History:  Asthma, moderate persistent  Degenerative tear of meniscus  Diabetes mellitis, type 2  Diabetic neuropathy  Diverticulosis  Erectile dysfunction  Edema extremities, venous insufficiency  Gastroesophageal reflux disease  Gout  Hyperlipidemia  Hypertension  Osteoarthritis, right shoulder  RLL lung nodule  Urge  incontinence  Vasomotor rhinitis   Chronic obstructive pulmonary disease  Decreased GFR  Claudication, left leg, secondary to spinal stenosis     Past Surgical History:  Cataract IOL, RT/LT, bilateral  L4-5 fusion    Family History:  Cancer - paternal grandmother      Social History:   The patient is single, a nonsmoker, and does consume alcohol.      Physical Exam:   /67   Pulse 95   Wt 96.9 kg (213 lb 11.2 oz)   SpO2 96%   BMI 32.98 kg/m     Constitutional: Healthy, alert, no distress and cooperative  Head: Normocephalic. No masses, lesions, tenderness or abnormalities  Cardiovascular: JVP normal. RRR, S1,S2 no murmurs, clicks, rubs, or gallops. No pretibial edema.  No carotid bruits.  Respiratory: Clear to auscultation and percussion bilaterally.   Extremities: Hips, knees, shoulders joints are normal. No gross deformities noted, gait normal and normal muscle tone. Left  knee lateral joint line is slightly tender, right lateral joint , no effusions, no crepitance, internal and external rotation normal, flexion extenion normal .  No proximal muscle tenderness.  Able to climb onto the table with no difficulty.  Skin: No suspicious lesions or rashes  Neurologic: Reflexes 1/4 and symmetric. Romberg test is negative.         Assessment and Plan:  1. Weakness and diffuse pain of both lower extremities  He is unable to walk through a parking lot without having his legs feel weak. He notes his legs lock up when this happens.  Differential diagnosis includes statin toxicity, metabolic effect.  His physical exam is remarkably normal.  He has a little bit of lateral joint line tenderness on one knee but no evidence of myositis, or inflammatory joint disease.  Perhaps his fatigue, generalized weakness and ache may be due to polymyalgia rheumatica.  Will check labs.  Will do test as listed below for further evaluation. Pending results will follow up as needed.   - CK total  - Erythrocyte sedimentation  rate  - CRP inflammation  - CBC with platelets differential    2. Type 2 diabetes mellitus with diabetic polyneuropathy, with long-term current use of insulin (H)  His blood sugars have been ranging between 60 and 150 as per Naseem.   - Hemoglobin A1c **(3 MO)    3. Screening for diabetic retinopathy  - OPHTHALMOLOGY ADULT REFERRAL    4.  Memory changes.  Not explicitly checked today.  However talking with his friend Naseem and the patient his memory is stable.    5.  Screening for hypothyroidism  Increased weakness. Check labs and pending results, follow up as needed.   - TSH with free T4 reflex       Follow-up: Return in about 6 months (around 6/17/2019) for Physical Exam, Lab Work.         Scribe Disclosure:   I, Harmony Hernandez, am serving as a scribe to document services personally performed by Denise Russ MD at this visit, based upon the provider's statements to me. All documentation has been reviewed by the aforementioned provider prior to being entered into the official medical record.     Portions of this medical record were completed by a scribe. UPON MY REVIEW AND AUTHENTICATION BY ELECTRONIC SIGNATURE, this confirms (a) I performed the applicable clinical services, and (b) the record is accurate.     Denise Russ

## 2018-12-18 DIAGNOSIS — E11.9 TYPE 2 DIABETES MELLITUS WITHOUT COMPLICATION, WITH LONG-TERM CURRENT USE OF INSULIN (H): Primary | ICD-10-CM

## 2018-12-18 DIAGNOSIS — Z79.4 TYPE 2 DIABETES MELLITUS WITHOUT COMPLICATION, WITH LONG-TERM CURRENT USE OF INSULIN (H): Primary | ICD-10-CM

## 2018-12-31 DIAGNOSIS — I10 BENIGN ESSENTIAL HYPERTENSION: ICD-10-CM

## 2019-01-02 RX ORDER — ALLOPURINOL 300 MG/1
300 TABLET ORAL DAILY
Qty: 90 TABLET | Refills: 3 | Status: SHIPPED | OUTPATIENT
Start: 2019-01-02

## 2019-01-15 DIAGNOSIS — E11.9 DIABETES MELLITUS (H): Primary | ICD-10-CM

## 2019-01-16 RX ORDER — BLOOD-GLUCOSE METER
EACH MISCELLANEOUS
Qty: 1 KIT | Refills: 0 | Status: SHIPPED | OUTPATIENT
Start: 2019-01-16 | End: 2020-01-15

## 2019-01-30 DIAGNOSIS — R60.9 EDEMA, UNSPECIFIED TYPE: ICD-10-CM

## 2019-01-30 RX ORDER — CHLORTHALIDONE 25 MG/1
TABLET ORAL
Qty: 45 TABLET | Refills: 3 | Status: SHIPPED | OUTPATIENT
Start: 2019-01-30

## 2019-01-30 NOTE — TELEPHONE ENCOUNTER
chlorthalidone (HYGROTON) 25 MG tablet  Last Written Prescription Date:  12/17/18  Last Fill Quantity: 45,   # refills: 0  Last Office Visit : 12/17/18  Future Office visit:  6/17/19    Routing refill request to provider for review/approval because:  Med failed protocol, abnormal lab    Recent Labs   Lab Test 06/26/18  1427   CR 1.51*

## 2019-01-31 ENCOUNTER — MYC REFILL (OUTPATIENT)
Dept: INTERNAL MEDICINE | Facility: CLINIC | Age: 79
End: 2019-01-31

## 2019-02-01 DIAGNOSIS — E11.9 DIABETES MELLITUS TYPE 2, INSULIN DEPENDENT (H): Primary | ICD-10-CM

## 2019-02-01 DIAGNOSIS — Z79.4 DIABETES MELLITUS TYPE 2, INSULIN DEPENDENT (H): Primary | ICD-10-CM

## 2019-02-01 RX ORDER — INSULIN GLARGINE 100 [IU]/ML
30 INJECTION, SOLUTION SUBCUTANEOUS 2 TIMES DAILY
Qty: 18 ML | Refills: 11 | Status: SHIPPED | OUTPATIENT
Start: 2019-02-01 | End: 2020-02-01

## 2019-02-01 NOTE — TELEPHONE ENCOUNTER
insulin glargine (BASAGLAR KWIKPEN) 100 UNIT/ML pen      Not active on med list  Last Office Visit : 12-17-18  Future Office visit:  6-17-19    Routing refill request to provider for review/approval because:  Drug not active on patient's medication list

## 2019-02-27 ENCOUNTER — MYC MEDICAL ADVICE (OUTPATIENT)
Dept: INTERNAL MEDICINE | Facility: CLINIC | Age: 79
End: 2019-02-27

## 2019-03-04 ENCOUNTER — TRANSFERRED RECORDS (OUTPATIENT)
Dept: HEALTH INFORMATION MANAGEMENT | Facility: CLINIC | Age: 79
End: 2019-03-04

## 2019-04-09 RX ORDER — GLIPIZIDE 10 MG/1
10 TABLET ORAL 2 TIMES DAILY
Qty: 180 TABLET | Refills: 0 | Status: SHIPPED | OUTPATIENT
Start: 2019-04-09 | End: 2019-07-12

## 2019-04-09 NOTE — TELEPHONE ENCOUNTER
Last Clinic Visit: 12/17/2018  [facility]    Lab(s) past due-A1C.  Magaly refill 90 days and FYI to clinic.    *Last Creatinine result high and noting results have been consistently elevated past several years. Result was reviewed by provider.

## 2019-06-13 DIAGNOSIS — E11.21 DIABETIC NEPHROPATHY (H): ICD-10-CM

## 2019-06-14 ENCOUNTER — TRANSFERRED RECORDS (OUTPATIENT)
Dept: HEALTH INFORMATION MANAGEMENT | Facility: CLINIC | Age: 79
End: 2019-06-14

## 2019-06-17 NOTE — TELEPHONE ENCOUNTER
gabapentin (NEURONTIN) 300 MG capsule    Last Written Prescription Date:  11/19/18  Last Fill Quantity: 360,   # refills: 2  Last Office Visit : 12/17/18  Future Office visit:  (6/17/19, guessig he didn't show today?)    Routing refill request to provider for review/approval because:  Drug not on the [facility], [facility] or [facility] refill protocol or controlled substance        celecoxib (CELEBREX) 200 MG capsule    Last Written Prescription Date:  6/4/18  Last Fill Quantity: 90,   # refills: 3  Last Office Visit : 12/17/18  Future Office visit:  (6/17/19, guessig he didn't show today?)    Routing refill request to provider for review/approval because:  Failed protocol for age.    Also overdue ALT/AST, will send note to clinic CMA.  Last CBC, slightly abnormal WBC, but OK per Dr. Russ's result note to patient.

## 2019-06-18 RX ORDER — GABAPENTIN 300 MG/1
600 CAPSULE ORAL 2 TIMES DAILY
Qty: 360 CAPSULE | Refills: 2 | Status: SHIPPED | OUTPATIENT
Start: 2019-06-18

## 2019-06-18 RX ORDER — CELECOXIB 200 MG/1
200 CAPSULE ORAL DAILY
Qty: 90 CAPSULE | Refills: 3 | Status: SHIPPED | OUTPATIENT
Start: 2019-06-18

## 2019-07-02 ENCOUNTER — MYC REFILL (OUTPATIENT)
Dept: INTERNAL MEDICINE | Facility: CLINIC | Age: 79
End: 2019-07-02

## 2019-07-02 DIAGNOSIS — E11.9 DIABETES MELLITUS (H): ICD-10-CM

## 2019-07-10 ENCOUNTER — TELEPHONE (OUTPATIENT)
Dept: INTERNAL MEDICINE | Facility: CLINIC | Age: 79
End: 2019-07-10

## 2019-07-10 DIAGNOSIS — E11.9 DIABETES MELLITUS (H): ICD-10-CM

## 2019-07-10 DIAGNOSIS — E11.9 DIABETES MELLITUS TYPE 2, INSULIN DEPENDENT (H): ICD-10-CM

## 2019-07-10 DIAGNOSIS — I10 ESSENTIAL HYPERTENSION WITH GOAL BLOOD PRESSURE LESS THAN 130/80: ICD-10-CM

## 2019-07-10 DIAGNOSIS — Z79.4 DIABETES MELLITUS TYPE 2, INSULIN DEPENDENT (H): ICD-10-CM

## 2019-07-10 DIAGNOSIS — E78.5 HYPERLIPEMIA: ICD-10-CM

## 2019-07-10 DIAGNOSIS — R35.1 BENIGN PROSTATIC HYPERPLASIA WITH NOCTURIA: ICD-10-CM

## 2019-07-10 DIAGNOSIS — N40.1 BENIGN PROSTATIC HYPERPLASIA WITH NOCTURIA: ICD-10-CM

## 2019-07-10 RX ORDER — AMLODIPINE BESYLATE 10 MG/1
10 TABLET ORAL DAILY
Qty: 90 TABLET | Refills: 0 | Status: SHIPPED | OUTPATIENT
Start: 2019-07-10 | End: 2019-12-18

## 2019-07-10 RX ORDER — METFORMIN HYDROCHLORIDE 500 MG/1
TABLET, FILM COATED, EXTENDED RELEASE ORAL
Qty: 120 TABLET | Refills: 3 | Status: SHIPPED | OUTPATIENT
Start: 2019-07-10 | End: 2019-07-10 | Stop reason: DRUGHIGH

## 2019-07-10 RX ORDER — METFORMIN HCL 500 MG
TABLET, EXTENDED RELEASE 24 HR ORAL
Qty: 120 TABLET | Refills: 3 | Status: SHIPPED | OUTPATIENT
Start: 2019-07-10

## 2019-07-10 NOTE — TELEPHONE ENCOUNTER
Spoke with  pharmacy Eliza [facility]. Rx for Metformin (Glumetza) was tranferred from [facility] and there is confusion with dose and direction. Per  pharmacy, [facility] had been filling since last fall metformin  tabs 5 per day, but not sure if the pt was taking 4 or 5 tabs per day as 120 tabs would not be enough for the supply. The insurance was not allowing Glumetza, but would cover  metformin ER with a max of 4 per day. Pharmacy is needing clarification on metformin dosage and direction before refilling Rx for pt. Porsha Prather CMA at 12:39 PM on 7/10/2019

## 2019-07-10 NOTE — TELEPHONE ENCOUNTER
Patient is getting testing supplies filled at [facility] in [place]. Previous pharmacy doesn't bill Medicare for testing supplies.    Thanks,    Irma Harrington CPhT  [facility] [place]

## 2019-07-10 NOTE — TELEPHONE ENCOUNTER
New RX for Metformin sent to [facility] pharmacy.  Charisma Carlson RN 1:36 PM on 7/10/2019.

## 2019-07-10 NOTE — TELEPHONE ENCOUNTER
Last Clinic Visit: 12/17/2018  [facility]  Creat overdue - clinic notified.

## 2019-07-12 RX ORDER — LOSARTAN POTASSIUM 100 MG/1
100 TABLET ORAL DAILY
Qty: 90 TABLET | Refills: 1 | Status: SHIPPED | OUTPATIENT
Start: 2019-07-12

## 2019-07-12 RX ORDER — TAMSULOSIN HYDROCHLORIDE 0.4 MG/1
0.4 CAPSULE ORAL DAILY
Qty: 90 CAPSULE | Refills: 0 | Status: SHIPPED | OUTPATIENT
Start: 2019-07-12

## 2019-07-12 RX ORDER — GLIPIZIDE 10 MG/1
10 TABLET ORAL 2 TIMES DAILY
Qty: 180 TABLET | Refills: 0 | Status: SHIPPED | OUTPATIENT
Start: 2019-07-12

## 2019-07-12 RX ORDER — SIMVASTATIN 20 MG
20 TABLET ORAL AT BEDTIME
Qty: 90 TABLET | Refills: 1 | Status: SHIPPED | OUTPATIENT
Start: 2019-07-12

## 2019-07-12 NOTE — TELEPHONE ENCOUNTER
glipiZIDE (GLUCOTROL) 10 MG tablet      Last Written Prescription Date:  4/9/19  Last Fill Quantity: 180,   # refills: 0  Last Office Visit : 12/17/18  Future Office visit:  none  90 day patricia.     Scheduling has been notified to contact the pt for appointment/ labs    Routing FYI because:  Overdue    Labs    Appointment

## 2019-07-12 NOTE — TELEPHONE ENCOUNTER
Last Clinic Visit: 9/4/2018  [facility] [[email or web address]] for Prostate and Urologic Cancers

## 2019-07-12 NOTE — TELEPHONE ENCOUNTER
Last Clinic Visit: 12/17/2018  [facility]    Overdue labs - clinic notified

## 2019-07-19 ENCOUNTER — OFFICE VISIT (OUTPATIENT)
Dept: URGENT CARE | Facility: URGENT CARE | Age: 79
End: 2019-07-19
Payer: MEDICARE

## 2019-07-19 VITALS
WEIGHT: 211 LBS | SYSTOLIC BLOOD PRESSURE: 153 MMHG | BODY MASS INDEX: 31.98 KG/M2 | HEIGHT: 68 IN | DIASTOLIC BLOOD PRESSURE: 93 MMHG | HEART RATE: 99 BPM | TEMPERATURE: 97.9 F | OXYGEN SATURATION: 95 % | RESPIRATION RATE: 16 BRPM

## 2019-07-19 DIAGNOSIS — J20.9 ACUTE BRONCHITIS, UNSPECIFIED ORGANISM: Primary | ICD-10-CM

## 2019-07-19 PROCEDURE — 99213 OFFICE O/P EST LOW 20 MIN: CPT | Performed by: FAMILY MEDICINE

## 2019-07-19 RX ORDER — AZITHROMYCIN 250 MG/1
TABLET, FILM COATED ORAL
Qty: 6 TABLET | Refills: 0 | Status: ON HOLD | OUTPATIENT
Start: 2019-07-19 | End: 2019-07-24

## 2019-07-19 ASSESSMENT — PAIN SCALES - GENERAL: PAINLEVEL: MODERATE PAIN (4)

## 2019-07-19 ASSESSMENT — MIFFLIN-ST. JEOR: SCORE: 1651.59

## 2019-07-19 NOTE — PATIENT INSTRUCTIONS
Patient Education     Bronchitis, Antibiotic Treatment (Adult)    Bronchitis is an infection of the air passages (bronchial tubes) in your lungs. It often occurs when you have a cold. This illness is contagious during the first few days and is spread through the air by coughing and sneezing, or by direct contact (touching the sick person and then touching your own eyes, nose, or mouth).  Symptoms of bronchitis include cough with mucus (phlegm) and low-grade fever. Bronchitis usually lasts 7 to 14 days. Mild cases can be treated with simple home remedies. More severe infection is treated with an antibiotic.  Home care  Follow these guidelines when caring for yourself at home:    If your symptoms are severe, rest at home for the first 2 to 3 days. When you go back to your usual activities, don't let yourself get too tired.    Don't smoke. Also stay away from secondhand smoke.    You may use over-the-counter medicines to control fever or pain, unless another medicine was prescribed. If you have chronic liver or kidney disease or have ever had a stomach ulcer or gastrointestinal bleeding, talk with your healthcare provider before using these medicines. Also talk to your provider if you are taking medicine to prevent blood clots. Aspirin should never be given to anyone younger than 18 who is ill with a viral infection or fever. It may cause severe liver or brain damage.    Your appetite may be low, so a light diet is fine. Stay well hydrated by drinking 6 to 8 glasses of fluids per day. This includes water, soft drinks, sports drinks, juices, tea, or soup. Extra fluids will help loosen mucus in your nose and lungs.    Over-the-counter cough, cold, and sore-throat medicines will not shorten the length of the illness, but they may be helpful to reduce your symptoms. Don't use decongestants if you have high blood pressure.    Finish all antibiotic medicine. Do this even if you are feeling better after only a few  days.  Follow-up care  Follow up with your healthcare provider, or as advised. If you had an X-ray or ECG (electrocardiogram), a specialist will review it. You will be told of any new test results that may affect your care.  If you are age 65 or older, if you smoke, or if you have a chronic lung disease or condition that affects your immune system, ask your healthcare provider about getting a pneumococcal vaccine and a yearly flu shot (influenza vaccine).  When to seek medical advice  Call your healthcare provider right away if any of these occur:    Fever of 100.4 F (38 C) or higher, or as directed by your healthcare provider    Coughing up more sputum    Weakness, drowsiness, headache, facial pain, ear pain, or a stiff neck  Call 911  Call 911 if any of these occur.    Coughing up blood    Weakness, drowsiness, headache, or stiff neck that get worse    Trouble breathing, wheezing, or pain with breathing  Date Last Reviewed: 6/1/2018 2000-2018 [employer]. [address] [place] [address]. All rights reserved. This information is not intended as a substitute for professional medical care. Always follow your healthcare professional's instructions.           Patient Education     Controlling Your Asthma  You can do a lot to manage your asthma and improve your quality of life. You will need to work with your healthcare provider to develop a plan. But it s up to you to put this plan into action.  Why you need to take control  You need to control the inflammation in your lungs. Take all medicine as directed, especially controller medicines, even if you feel that your asthma is under good control. You also need to relieve symptoms when you have them. These are long-term tasks. But the more you stay in control, the better you ll feel. If you don t stay in control:    Asthma symptoms may cause you to miss school, work, or activities that you enjoy.    Asthma flare-ups can be dangerous, even  deadly.    Uncontrolled asthma makes it more likely that you will need emergency department and in-hospital care.    Uncontrolled asthma may cause permanent damage to your lungs.    Peak flow monitoring helps measure how open your airways are.   Taking medicine helps you control your asthma and relieve symptoms when they occur.     Using an Asthma Action Plan will help you keep track of and respond to asthma symptoms.   Avoiding triggers--the things that inflame your airways--will help prevent symptoms and flare-ups.   Your action plan  Your healthcare provider will help you prepare, and when needed, update your personal Asthma Action Plan. Your plan tells you what to do based on your current symptoms. If you don't have an Asthma Action Plan, or if yours isn't up-to-date, make sure you talk with your healthcare provider.  Date Last Reviewed: 1/1/2017 2000-2018 [employer]. [address] [place] [address], [place] [address]. All rights reserved. This information is not intended as a substitute for professional medical care. Always follow your healthcare professional's instructions.

## 2019-07-22 ENCOUNTER — HOSPITAL ENCOUNTER (OUTPATIENT)
Facility: CLINIC | Age: 79
Setting detail: OBSERVATION
Discharge: HOME | End: 2019-07-24
Attending: EMERGENCY MEDICINE | Admitting: EMERGENCY MEDICINE
Payer: MEDICARE

## 2019-07-22 ENCOUNTER — APPOINTMENT (OUTPATIENT)
Dept: GENERAL RADIOLOGY | Facility: CLINIC | Age: 79
End: 2019-07-22
Attending: EMERGENCY MEDICINE
Payer: MEDICARE

## 2019-07-22 ENCOUNTER — TRANSFERRED RECORDS (OUTPATIENT)
Dept: HEALTH INFORMATION MANAGEMENT | Facility: CLINIC | Age: 79
End: 2019-07-22

## 2019-07-22 DIAGNOSIS — J18.9 PNEUMONIA OF LEFT LOWER LOBE DUE TO INFECTIOUS ORGANISM: ICD-10-CM

## 2019-07-22 DIAGNOSIS — J45.40 MODERATE PERSISTENT ASTHMA, UNSPECIFIED WHETHER COMPLICATED: ICD-10-CM

## 2019-07-22 DIAGNOSIS — J40 BRONCHITIS, NOT SPECIFIED AS ACUTE OR CHRONIC: ICD-10-CM

## 2019-07-22 DIAGNOSIS — J18.9 COMMUNITY ACQUIRED PNEUMONIA, UNSPECIFIED LATERALITY: ICD-10-CM

## 2019-07-22 DIAGNOSIS — K59.03 DRUG-INDUCED CONSTIPATION: Primary | ICD-10-CM

## 2019-07-22 DIAGNOSIS — I10 ESSENTIAL HYPERTENSION, MALIGNANT: ICD-10-CM

## 2019-07-22 DIAGNOSIS — M54.2 NECK PAIN: ICD-10-CM

## 2019-07-22 DIAGNOSIS — M25.311 INSUFFICIENCY OF RIGHT ROTATOR CUFF: ICD-10-CM

## 2019-07-22 DIAGNOSIS — R41.0 CONFUSION: ICD-10-CM

## 2019-07-22 DIAGNOSIS — M10.9 GOUT, UNSPECIFIED CAUSE, UNSPECIFIED CHRONICITY, UNSPECIFIED SITE: ICD-10-CM

## 2019-07-22 DIAGNOSIS — E11.9 TYPE 2 DIABETES MELLITUS WITHOUT COMPLICATION, UNSPECIFIED WHETHER LONG TERM INSULIN USE (H): ICD-10-CM

## 2019-07-22 DIAGNOSIS — G24.3 SPASMODIC TORTICOLLIS: ICD-10-CM

## 2019-07-22 LAB
ALBUMIN UR-MCNC: 30 MG/DL
ANION GAP SERPL CALCULATED.3IONS-SCNC: 10 MMOL/L (ref 3–14)
APPEARANCE UR: CLEAR
BASOPHILS # BLD AUTO: 0.1 10E9/L (ref 0–0.2)
BASOPHILS NFR BLD AUTO: 0.5 %
BILIRUB UR QL STRIP: NEGATIVE
BUN SERPL-MCNC: 34 MG/DL (ref 7–30)
CALCIUM SERPL-MCNC: 9.3 MG/DL (ref 8.5–10.1)
CHLORIDE SERPL-SCNC: 104 MMOL/L (ref 94–109)
CO2 BLDCOV-SCNC: 22 MMOL/L (ref 21–28)
CO2 SERPL-SCNC: 23 MMOL/L (ref 20–32)
COLOR UR AUTO: YELLOW
CREAT SERPL-MCNC: 1.6 MG/DL (ref 0.66–1.25)
DIFFERENTIAL METHOD BLD: ABNORMAL
EOSINOPHIL # BLD AUTO: 0 10E9/L (ref 0–0.7)
EOSINOPHIL NFR BLD AUTO: 0.2 %
ERYTHROCYTE [DISTWIDTH] IN BLOOD BY AUTOMATED COUNT: 13.1 % (ref 10–15)
GFR SERPL CREATININE-BSD FRML MDRD: 40 ML/MIN/{1.73_M2}
GLUCOSE BLDC GLUCOMTR-MCNC: 135 MG/DL (ref 70–99)
GLUCOSE BLDC GLUCOMTR-MCNC: 245 MG/DL (ref 70–99)
GLUCOSE BLDC GLUCOMTR-MCNC: 254 MG/DL (ref 70–99)
GLUCOSE SERPL-MCNC: 164 MG/DL (ref 70–99)
GLUCOSE UR STRIP-MCNC: NEGATIVE MG/DL
HBA1C MFR BLD: 6.8 % (ref 0–5.6)
HCT VFR BLD AUTO: 44.8 % (ref 40–53)
HGB BLD-MCNC: 14.7 G/DL (ref 13.3–17.7)
HGB UR QL STRIP: NEGATIVE
IMM GRANULOCYTES # BLD: 0.1 10E9/L (ref 0–0.4)
IMM GRANULOCYTES NFR BLD: 0.5 %
INTERPRETATION ECG - MUSE: NORMAL
KETONES UR STRIP-MCNC: 5 MG/DL
LACTATE BLD-SCNC: 1.1 MMOL/L (ref 0.7–2.1)
LACTATE BLD-SCNC: 1.4 MMOL/L (ref 0.7–2)
LEUKOCYTE ESTERASE UR QL STRIP: NEGATIVE
LYMPHOCYTES # BLD AUTO: 2.7 10E9/L (ref 0.8–5.3)
LYMPHOCYTES NFR BLD AUTO: 14.7 %
MCH RBC QN AUTO: 30.2 PG (ref 26.5–33)
MCHC RBC AUTO-ENTMCNC: 32.8 G/DL (ref 31.5–36.5)
MCV RBC AUTO: 92 FL (ref 78–100)
MONOCYTES # BLD AUTO: 1.6 10E9/L (ref 0–1.3)
MONOCYTES NFR BLD AUTO: 8.6 %
MUCOUS THREADS #/AREA URNS LPF: PRESENT /LPF
NEUTROPHILS # BLD AUTO: 14.1 10E9/L (ref 1.6–8.3)
NEUTROPHILS NFR BLD AUTO: 75.5 %
NITRATE UR QL: NEGATIVE
NRBC # BLD AUTO: 0 10*3/UL
NRBC BLD AUTO-RTO: 0 /100
NT-PROBNP SERPL-MCNC: 406 PG/ML (ref 0–1800)
PCO2 BLDV: 41 MM HG (ref 40–50)
PH BLDV: 7.33 PH (ref 7.32–7.43)
PH UR STRIP: 5.5 PH (ref 5–7)
PLATELET # BLD AUTO: 280 10E9/L (ref 150–450)
PO2 BLDV: 25 MM HG (ref 25–47)
POTASSIUM SERPL-SCNC: 4 MMOL/L (ref 3.4–5.3)
PROCALCITONIN SERPL-MCNC: 0.08 NG/ML
RBC # BLD AUTO: 4.87 10E12/L (ref 4.4–5.9)
RBC #/AREA URNS AUTO: 1 /HPF (ref 0–2)
SAO2 % BLDV FROM PO2: 41 %
SODIUM SERPL-SCNC: 138 MMOL/L (ref 133–144)
SOURCE: ABNORMAL
SP GR UR STRIP: 1.02 (ref 1–1.03)
TROPONIN I BLD-MCNC: 0 UG/L (ref 0–0.08)
UROBILINOGEN UR STRIP-MCNC: NORMAL MG/DL (ref 0–2)
WBC # BLD AUTO: 18.7 10E9/L (ref 4–11)
WBC #/AREA URNS AUTO: 1 /HPF (ref 0–5)

## 2019-07-22 PROCEDURE — 96365 THER/PROPH/DIAG IV INF INIT: CPT | Performed by: EMERGENCY MEDICINE

## 2019-07-22 PROCEDURE — 93005 ELECTROCARDIOGRAM TRACING: CPT | Performed by: EMERGENCY MEDICINE

## 2019-07-22 PROCEDURE — 25000131 ZZH RX MED GY IP 250 OP 636 PS 637: Mod: GY | Performed by: EMERGENCY MEDICINE

## 2019-07-22 PROCEDURE — G0378 HOSPITAL OBSERVATION PER HR: HCPCS

## 2019-07-22 PROCEDURE — 82803 BLOOD GASES ANY COMBINATION: CPT

## 2019-07-22 PROCEDURE — 87086 URINE CULTURE/COLONY COUNT: CPT | Performed by: NURSE PRACTITIONER

## 2019-07-22 PROCEDURE — 85025 COMPLETE CBC W/AUTO DIFF WBC: CPT | Performed by: EMERGENCY MEDICINE

## 2019-07-22 PROCEDURE — 25000125 ZZHC RX 250: Performed by: EMERGENCY MEDICINE

## 2019-07-22 PROCEDURE — 25000131 ZZH RX MED GY IP 250 OP 636 PS 637: Mod: GY | Performed by: NURSE PRACTITIONER

## 2019-07-22 PROCEDURE — 96372 THER/PROPH/DIAG INJ SC/IM: CPT

## 2019-07-22 PROCEDURE — 99285 EMERGENCY DEPT VISIT HI MDM: CPT | Mod: 25 | Performed by: EMERGENCY MEDICINE

## 2019-07-22 PROCEDURE — 83605 ASSAY OF LACTIC ACID: CPT

## 2019-07-22 PROCEDURE — 80048 BASIC METABOLIC PNL TOTAL CA: CPT | Performed by: EMERGENCY MEDICINE

## 2019-07-22 PROCEDURE — 25000132 ZZH RX MED GY IP 250 OP 250 PS 637: Mod: GY | Performed by: EMERGENCY MEDICINE

## 2019-07-22 PROCEDURE — 25000128 H RX IP 250 OP 636: Performed by: EMERGENCY MEDICINE

## 2019-07-22 PROCEDURE — 81001 URINALYSIS AUTO W/SCOPE: CPT | Performed by: NURSE PRACTITIONER

## 2019-07-22 PROCEDURE — 84484 ASSAY OF TROPONIN QUANT: CPT

## 2019-07-22 PROCEDURE — 94640 AIRWAY INHALATION TREATMENT: CPT

## 2019-07-22 PROCEDURE — 71046 X-RAY EXAM CHEST 2 VIEWS: CPT

## 2019-07-22 PROCEDURE — 40000275 ZZH STATISTIC RCP TIME EA 10 MIN

## 2019-07-22 PROCEDURE — 36415 COLL VENOUS BLD VENIPUNCTURE: CPT | Performed by: NURSE PRACTITIONER

## 2019-07-22 PROCEDURE — 94640 AIRWAY INHALATION TREATMENT: CPT | Performed by: EMERGENCY MEDICINE

## 2019-07-22 PROCEDURE — 99220 ZZC INITIAL OBSERVATION CARE,LEVL III: CPT | Mod: Z6 | Performed by: EMERGENCY MEDICINE

## 2019-07-22 PROCEDURE — 96375 TX/PRO/DX INJ NEW DRUG ADDON: CPT

## 2019-07-22 PROCEDURE — 83880 ASSAY OF NATRIURETIC PEPTIDE: CPT | Performed by: EMERGENCY MEDICINE

## 2019-07-22 PROCEDURE — 93010 ELECTROCARDIOGRAM REPORT: CPT | Mod: Z6 | Performed by: EMERGENCY MEDICINE

## 2019-07-22 PROCEDURE — 25000132 ZZH RX MED GY IP 250 OP 250 PS 637: Mod: GY | Performed by: NURSE PRACTITIONER

## 2019-07-22 PROCEDURE — 84145 PROCALCITONIN (PCT): CPT | Performed by: EMERGENCY MEDICINE

## 2019-07-22 PROCEDURE — 96375 TX/PRO/DX INJ NEW DRUG ADDON: CPT | Performed by: EMERGENCY MEDICINE

## 2019-07-22 PROCEDURE — 25000125 ZZHC RX 250: Performed by: NURSE PRACTITIONER

## 2019-07-22 PROCEDURE — 83036 HEMOGLOBIN GLYCOSYLATED A1C: CPT | Performed by: NURSE PRACTITIONER

## 2019-07-22 PROCEDURE — 00000146 ZZHCL STATISTIC GLUCOSE BY METER IP

## 2019-07-22 PROCEDURE — 87040 BLOOD CULTURE FOR BACTERIA: CPT | Performed by: EMERGENCY MEDICINE

## 2019-07-22 PROCEDURE — 25800030 ZZH RX IP 258 OP 636: Performed by: NURSE PRACTITIONER

## 2019-07-22 RX ORDER — CEFTRIAXONE 2 G/1
2 INJECTION, POWDER, FOR SOLUTION INTRAMUSCULAR; INTRAVENOUS EVERY 24 HOURS
Status: DISCONTINUED | OUTPATIENT
Start: 2019-07-23 | End: 2019-07-24 | Stop reason: HOSPADM

## 2019-07-22 RX ORDER — TAMSULOSIN HYDROCHLORIDE 0.4 MG/1
0.4 CAPSULE ORAL DAILY
Status: DISCONTINUED | OUTPATIENT
Start: 2019-07-22 | End: 2019-07-24 | Stop reason: HOSPADM

## 2019-07-22 RX ORDER — ASPIRIN 81 MG/1
81 TABLET ORAL DAILY
Status: DISCONTINUED | OUTPATIENT
Start: 2019-07-22 | End: 2019-07-24 | Stop reason: HOSPADM

## 2019-07-22 RX ORDER — DEXTROSE MONOHYDRATE 25 G/50ML
25-50 INJECTION, SOLUTION INTRAVENOUS
Status: DISCONTINUED | OUTPATIENT
Start: 2019-07-22 | End: 2019-07-24 | Stop reason: HOSPADM

## 2019-07-22 RX ORDER — OXYCODONE HYDROCHLORIDE 5 MG/1
5 TABLET ORAL EVERY 4 HOURS PRN
Status: DISCONTINUED | OUTPATIENT
Start: 2019-07-22 | End: 2019-07-24 | Stop reason: HOSPADM

## 2019-07-22 RX ORDER — CYCLOBENZAPRINE HCL 5 MG
10 TABLET ORAL ONCE
Status: COMPLETED | OUTPATIENT
Start: 2019-07-22 | End: 2019-07-22

## 2019-07-22 RX ORDER — ACETAMINOPHEN 325 MG/1
650 TABLET ORAL EVERY 4 HOURS
Status: DISCONTINUED | OUTPATIENT
Start: 2019-07-22 | End: 2019-07-24 | Stop reason: HOSPADM

## 2019-07-22 RX ORDER — METHOCARBAMOL 500 MG/1
500 TABLET, FILM COATED ORAL 4 TIMES DAILY
Status: DISCONTINUED | OUTPATIENT
Start: 2019-07-22 | End: 2019-07-24 | Stop reason: HOSPADM

## 2019-07-22 RX ORDER — PREDNISONE 20 MG/1
40 TABLET ORAL ONCE
Status: COMPLETED | OUTPATIENT
Start: 2019-07-22 | End: 2019-07-22

## 2019-07-22 RX ORDER — LIDOCAINE 4 G/G
2 PATCH TOPICAL
Status: DISCONTINUED | OUTPATIENT
Start: 2019-07-23 | End: 2019-07-24 | Stop reason: HOSPADM

## 2019-07-22 RX ORDER — LOSARTAN POTASSIUM 100 MG/1
100 TABLET ORAL DAILY
Status: DISCONTINUED | OUTPATIENT
Start: 2019-07-22 | End: 2019-07-24 | Stop reason: HOSPADM

## 2019-07-22 RX ORDER — ONDANSETRON 2 MG/ML
4 INJECTION INTRAMUSCULAR; INTRAVENOUS EVERY 6 HOURS PRN
Status: DISCONTINUED | OUTPATIENT
Start: 2019-07-22 | End: 2019-07-24 | Stop reason: HOSPADM

## 2019-07-22 RX ORDER — ONDANSETRON 4 MG/1
4 TABLET, ORALLY DISINTEGRATING ORAL EVERY 6 HOURS PRN
Status: DISCONTINUED | OUTPATIENT
Start: 2019-07-22 | End: 2019-07-24 | Stop reason: HOSPADM

## 2019-07-22 RX ORDER — ALLOPURINOL 300 MG/1
300 TABLET ORAL DAILY
Status: DISCONTINUED | OUTPATIENT
Start: 2019-07-22 | End: 2019-07-24 | Stop reason: HOSPADM

## 2019-07-22 RX ORDER — ALBUTEROL SULFATE 0.83 MG/ML
2.5 SOLUTION RESPIRATORY (INHALATION)
Status: DISCONTINUED | OUTPATIENT
Start: 2019-07-22 | End: 2019-07-24 | Stop reason: HOSPADM

## 2019-07-22 RX ORDER — DOXYCYCLINE 100 MG/1
100 CAPSULE ORAL ONCE
Status: COMPLETED | OUTPATIENT
Start: 2019-07-22 | End: 2019-07-22

## 2019-07-22 RX ORDER — LIDOCAINE 40 MG/G
CREAM TOPICAL
Status: DISCONTINUED | OUTPATIENT
Start: 2019-07-22 | End: 2019-07-24 | Stop reason: HOSPADM

## 2019-07-22 RX ORDER — TOLTERODINE 2 MG/1
2 CAPSULE, EXTENDED RELEASE ORAL DAILY
Status: DISCONTINUED | OUTPATIENT
Start: 2019-07-22 | End: 2019-07-24 | Stop reason: HOSPADM

## 2019-07-22 RX ORDER — NICOTINE POLACRILEX 4 MG
15-30 LOZENGE BUCCAL
Status: DISCONTINUED | OUTPATIENT
Start: 2019-07-22 | End: 2019-07-24 | Stop reason: HOSPADM

## 2019-07-22 RX ORDER — LIDOCAINE 4 G/G
1 PATCH TOPICAL ONCE
Status: DISCONTINUED | OUTPATIENT
Start: 2019-07-22 | End: 2019-07-22

## 2019-07-22 RX ORDER — HYDROMORPHONE HYDROCHLORIDE 1 MG/ML
0.5 INJECTION, SOLUTION INTRAMUSCULAR; INTRAVENOUS; SUBCUTANEOUS
Status: COMPLETED | OUTPATIENT
Start: 2019-07-22 | End: 2019-07-22

## 2019-07-22 RX ORDER — GABAPENTIN 300 MG/1
600 CAPSULE ORAL 2 TIMES DAILY
Status: DISCONTINUED | OUTPATIENT
Start: 2019-07-22 | End: 2019-07-24 | Stop reason: HOSPADM

## 2019-07-22 RX ORDER — AMLODIPINE BESYLATE 10 MG/1
10 TABLET ORAL DAILY
Status: DISCONTINUED | OUTPATIENT
Start: 2019-07-22 | End: 2019-07-24 | Stop reason: HOSPADM

## 2019-07-22 RX ORDER — PREDNISONE 20 MG/1
40 TABLET ORAL DAILY
Status: DISCONTINUED | OUTPATIENT
Start: 2019-07-23 | End: 2019-07-24

## 2019-07-22 RX ORDER — NALOXONE HYDROCHLORIDE 0.4 MG/ML
.1-.4 INJECTION, SOLUTION INTRAMUSCULAR; INTRAVENOUS; SUBCUTANEOUS
Status: DISCONTINUED | OUTPATIENT
Start: 2019-07-22 | End: 2019-07-24 | Stop reason: HOSPADM

## 2019-07-22 RX ORDER — IPRATROPIUM BROMIDE AND ALBUTEROL SULFATE 2.5; .5 MG/3ML; MG/3ML
3 SOLUTION RESPIRATORY (INHALATION) ONCE
Status: COMPLETED | OUTPATIENT
Start: 2019-07-22 | End: 2019-07-22

## 2019-07-22 RX ORDER — IPRATROPIUM BROMIDE AND ALBUTEROL SULFATE 2.5; .5 MG/3ML; MG/3ML
3 SOLUTION RESPIRATORY (INHALATION) EVERY 4 HOURS
Status: DISCONTINUED | OUTPATIENT
Start: 2019-07-22 | End: 2019-07-23

## 2019-07-22 RX ORDER — METFORMIN HCL 500 MG
1000 TABLET, EXTENDED RELEASE 24 HR ORAL 2 TIMES DAILY WITH MEALS
Status: DISCONTINUED | OUTPATIENT
Start: 2019-07-22 | End: 2019-07-24

## 2019-07-22 RX ORDER — GLIPIZIDE 10 MG/1
10 TABLET ORAL 2 TIMES DAILY WITH MEALS
Status: DISCONTINUED | OUTPATIENT
Start: 2019-07-22 | End: 2019-07-24 | Stop reason: HOSPADM

## 2019-07-22 RX ORDER — CEFTRIAXONE 2 G/1
2 INJECTION, POWDER, FOR SOLUTION INTRAMUSCULAR; INTRAVENOUS ONCE
Status: COMPLETED | OUTPATIENT
Start: 2019-07-22 | End: 2019-07-22

## 2019-07-22 RX ORDER — DOXYCYCLINE 100 MG/10ML
100 INJECTION, POWDER, LYOPHILIZED, FOR SOLUTION INTRAVENOUS EVERY 12 HOURS
Status: DISCONTINUED | OUTPATIENT
Start: 2019-07-22 | End: 2019-07-24 | Stop reason: HOSPADM

## 2019-07-22 RX ORDER — SIMVASTATIN 20 MG
20 TABLET ORAL AT BEDTIME
Status: DISCONTINUED | OUTPATIENT
Start: 2019-07-22 | End: 2019-07-24 | Stop reason: HOSPADM

## 2019-07-22 RX ORDER — SODIUM CHLORIDE 9 MG/ML
INJECTION, SOLUTION INTRAVENOUS CONTINUOUS
Status: DISCONTINUED | OUTPATIENT
Start: 2019-07-22 | End: 2019-07-24

## 2019-07-22 RX ADMIN — GABAPENTIN 600 MG: 300 CAPSULE ORAL at 20:42

## 2019-07-22 RX ADMIN — SIMVASTATIN 20 MG: 20 TABLET, FILM COATED ORAL at 22:55

## 2019-07-22 RX ADMIN — DOXYCYCLINE 100 MG: 100 CAPSULE ORAL at 14:16

## 2019-07-22 RX ADMIN — HYDROMORPHONE HYDROCHLORIDE 0.5 MG: 1 INJECTION, SOLUTION INTRAMUSCULAR; INTRAVENOUS; SUBCUTANEOUS at 14:39

## 2019-07-22 RX ADMIN — ASPIRIN 81 MG: 81 TABLET, COATED ORAL at 18:45

## 2019-07-22 RX ADMIN — IPRATROPIUM BROMIDE AND ALBUTEROL SULFATE 3 ML: .5; 3 SOLUTION RESPIRATORY (INHALATION) at 20:30

## 2019-07-22 RX ADMIN — LOSARTAN POTASSIUM 100 MG: 100 TABLET, FILM COATED ORAL at 18:45

## 2019-07-22 RX ADMIN — ACETAMINOPHEN 650 MG: 325 TABLET, FILM COATED ORAL at 22:55

## 2019-07-22 RX ADMIN — TAMSULOSIN HYDROCHLORIDE 0.4 MG: 0.4 CAPSULE ORAL at 19:04

## 2019-07-22 RX ADMIN — DOXYCYCLINE 100 MG: 100 INJECTION, POWDER, LYOPHILIZED, FOR SOLUTION INTRAVENOUS at 22:51

## 2019-07-22 RX ADMIN — SODIUM CHLORIDE: 9 INJECTION, SOLUTION INTRAVENOUS at 19:06

## 2019-07-22 RX ADMIN — GLIPIZIDE 10 MG: 10 TABLET ORAL at 19:04

## 2019-07-22 RX ADMIN — CYCLOBENZAPRINE HYDROCHLORIDE 10 MG: 5 TABLET, FILM COATED ORAL at 11:28

## 2019-07-22 RX ADMIN — OMEPRAZOLE 40 MG: 20 CAPSULE, DELAYED RELEASE ORAL at 19:04

## 2019-07-22 RX ADMIN — AMLODIPINE BESYLATE 10 MG: 10 TABLET ORAL at 18:45

## 2019-07-22 RX ADMIN — METHOCARBAMOL 500 MG: 500 TABLET, FILM COATED ORAL at 20:42

## 2019-07-22 RX ADMIN — CHLORTHALIDONE 12.5 MG: 25 TABLET ORAL at 19:04

## 2019-07-22 RX ADMIN — INSULIN ASPART 3 UNITS: 100 INJECTION, SOLUTION INTRAVENOUS; SUBCUTANEOUS at 20:41

## 2019-07-22 RX ADMIN — TOLTERODINE 2 MG: 2 CAPSULE, EXTENDED RELEASE ORAL at 20:42

## 2019-07-22 RX ADMIN — CEFTRIAXONE SODIUM 2 G: 2 INJECTION, POWDER, FOR SOLUTION INTRAMUSCULAR; INTRAVENOUS at 14:16

## 2019-07-22 RX ADMIN — PREDNISONE 40 MG: 20 TABLET ORAL at 16:53

## 2019-07-22 RX ADMIN — LIDOCAINE 1 PATCH: 560 PATCH PERCUTANEOUS; TOPICAL; TRANSDERMAL at 12:12

## 2019-07-22 RX ADMIN — IPRATROPIUM BROMIDE AND ALBUTEROL SULFATE 3 ML: .5; 3 SOLUTION RESPIRATORY (INHALATION) at 11:28

## 2019-07-22 RX ADMIN — METFORMIN HYDROCHLORIDE 1000 MG: 500 TABLET, EXTENDED RELEASE ORAL at 19:04

## 2019-07-22 RX ADMIN — ACETAMINOPHEN 650 MG: 325 TABLET, FILM COATED ORAL at 18:45

## 2019-07-22 RX ADMIN — ALLOPURINOL 300 MG: 300 TABLET ORAL at 18:45

## 2019-07-22 ASSESSMENT — ENCOUNTER SYMPTOMS
FEVER: 0
VOMITING: 0
WEAKNESS: 0
SHORTNESS OF BREATH: 1
HEADACHES: 0
NAUSEA: 0
NUMBNESS: 0
COUGH: 1
NECK PAIN: 1
DYSURIA: 0
ABDOMINAL PAIN: 0

## 2019-07-22 NOTE — PLAN OF CARE
Observation goals PRIOR TO DISCHARGE     Comments: List all goals to be met before discharge home:   - Dyspnea improved and oxygen saturations greater than 88% on room air or prior home oxygen levels   no   - Tolerates oral antibiotics or has plans for home infusion set up. no  - Vital signs normal or at patient baseline yes  - Infection is improving no  - Return to baseline functional status no  - Safe disposition plan has been identified no  - Nurse to notify provider when observation goals have been met and patient is ready for discharge

## 2019-07-22 NOTE — PROGRESS NOTES
"SUBJECTIVE:  Rolando Gonzalez, a 78 year old male scheduled an appointment to discuss the following issues:  Acute bronchitis, unspecified organism    Medical, social, surgical, and family histories reviewed.      Urgent Care    Pharyngitis (Sore throat, asthma flare-up, cough, sob x 1wk)     Since a week ago patient has been coughing; he feels that he is having a an asthma flare.  \"Sore\" in his chest with coughing.  Patient quit smoking 40 years ago but he has smoked for 20 years.  He does usually have shortness of breath on exertion.  He last had prednisone about 6 months to a year ago.    ROS:  See HPI.  No nausea/vomiting.  No fever/chills.  No chest pain; has mild SOB.  No BM/urine problems.  No dizziness or syncope.      OBJECTIVE:  BP (!) 153/93 (BP Location: Left arm, Patient Position: Sitting, Cuff Size: Adult Regular)   Pulse 99   Temp 97.9  F (36.6  C) (Oral)   Resp 16   Ht 1.727 m (5' 8\")   Wt 95.7 kg (211 lb)   SpO2 95%   BMI 32.08 kg/m    EXAM:  GENERAL APPEARANCE: alert and mild distress; afebrile, no cyanosis or accessory muscle use, moist mucous membrane  EYES: Eyes grossly normal to inspection, PERRL and conjunctivae and sclerae normal  HENT: ear canals and TM's normal and nose and mouth without ulcers or lesions  NECK: no adenopathy, no asymmetry, masses, or scars and thyroid normal to palpation  RESP: Mild scattered wheezes and rhonchi, fair air entry bilateral lungs  CV: regular rates and rhythm, normal S1 S2, no S3 or S4 and no murmur, click or rub  LYMPHATICS: no cervical adenopathy  ABDOMEN: soft, nontender, without hepatosplenomegaly or masses and bowel sounds normal  MS: extremities normal- no gross deformities noted  SKIN: no suspicious lesions or rashes  NEURO: Normal strength and tone, mentation intact and speech normal    ASSESSMENT/PLAN:  (J20.9) Acute bronchitis, unspecified organism  (primary encounter diagnosis)  Comments:  Offered CXR and Prednisone but pt declined.  He has " Albuterol and Symbicort inhalers at home but he seldom used them---I have instructed him to use them as prescribed.  Pt agreed to take a course of antibiotics  Plan: azithromycin (ZITHROMAX) 250 MG tablet    Pt to f/up PCP if no improvement or worsening .  Warning signs and symptoms explained, be seen ASAP if worsening.

## 2019-07-22 NOTE — H&P
"[facility]   History & Physical    Rolando Gonzalez MRN# [id number]   Age: 78 year old YOB: 1940     Date of Admission: 7/22/2019    Primary Care Provider: Denise Russ         Chief Complaint:   \"neck pain\"         History of Present Illness:   Rolando Gonzalez is a 78 year old male w/PMH significant for osteoarthritis, RLL lung nodule, moderate persistent asthma, hypertension, hyperlipoidemia, GERD< gout, ED, diabetic neuropathy, DM type 2 who presented to the ED with neck pain.  Per ER MD, \"Patient reports posterior neck pain that is worse with movement. He denies numbness or tingling in his extremities or weakness. He has not noticed a recent fever and has not had chest pain. Patient does not have a history of heart problems. Per chart review, he was recently seen at [facility] on Friday (7/19) where he was diagnosed with bronchitis and given a 5-day course of azithromycin. He does report that he had a recent exacerbation of his asthma on Monday (7/15) and notes that he feels more short of breath when lying flat.\"  In the ED the patient's vital signs showed HR was 102-111, /95, he was afebrile. Labs: BMP showed creatinine 1.60.  .  Procal 0.08.  Lactic acid 1.1.  Glucose 164.  Venous blood gas was unremarkable.  CBC with WBC 18.7 with left shift.  Chest x-ray showed small amount of left basilar atelectasis, otherwise no acute cardiopulmonary findings. EKG showed sinus tachycardia with RBBB (unchanged from prior).  He was given 2g Rocephin IV, flexeril, doxycyline PO, dilaudid, duoneb, prednisone 40 mg PO, and lidoderm patch in the ED.  He was admitted to the observation unit for possible pneumonia treatment.   Patient continued to have pain on admission to the observation unit, intermittent and worse with movement or coughing.  Patient's son present in the room and reports that patient is slightly more confused than usual.            "  Review of Systems:     All others reviewed and are negative         Past Medical History:     Past Medical History:   Diagnosis Date     Asthma, moderate persistent      Degenerative tear of meniscus      Diabetes mellitus type 2, insulin dependent (H)      Diabetic neuropathy (H)     improved with gabapentin; normal EMG 1/26/18     Diverticulosis      ED (erectile dysfunction)      Edema extremities 2/15/16    venous insufficiency     GERD (gastroesophageal reflux disease)      Gout      Hyperlipidemia      Hypertension      Moderate persistent asthma 2017    MILLI Álvarez MD allergist.  FEV1 1.83. FEF 25-75 1.5 (3/13/17)     Osteoarthritis, shoulder     right     RLL lung nodule 03/20/2017    stable 8 mm rll nodule.  No additional CT testing needed.       Urge incontinence      Vasomotor rhinitis              Past Surgical History:      Past Surgical History:   Procedure Laterality Date     CATARACT IOL, RT/LT      both eyes     L4-5 fusion               Family History:     Family History   Problem Relation Age of Onset     Cancer Paternal Grandmother              Social History:     Social History     Tobacco Use     Smoking status: Passive Smoke Exposure - Never Smoker     Smokeless tobacco: Never Used   Substance Use Topics     Alcohol use: Yes     Alcohol/week: 0.5 oz     Types: 1 Standard drinks or equivalent per week             Medications:     Current Facility-Administered Medications on File Prior to Encounter:  lidocaine (PF) (XYLOCAINE) 1 % injection 5 mL   triamcinolone acetonide (KENALOG-40) injection 40 mg     Current Outpatient Medications on File Prior to Encounter:  albuterol (PROAIR HFA, PROVENTIL HFA, VENTOLIN HFA) 108 (90 BASE) MCG/ACT inhaler Inhale 2 puffs into the lungs every 6 hours   allopurinol (ZYLOPRIM) 300 MG tablet Take 1 tablet (300 mg) by mouth daily   amLODIPine (NORVASC) 10 MG tablet Take 1 tablet (10 mg) by mouth daily   AMOXICILLIN PO Take 1,000 mg by mouth daily   aspirin 81 MG  tablet Take by mouth daily   azithromycin (ZITHROMAX) 250 MG tablet Take 2 tablets (500 mg) by mouth daily for 1 day, THEN 1 tablet (250 mg) daily for 4 days.   blood glucose (ACCU-CHEK MERY) test strip Test 2 times daily   blood glucose monitoring (ACCU-CHEK MERY PLUS) meter device kit Use to test blood sugar 2 times daily or as directed.   budesonide-formoterol (SYMBICORT) 80-4.5 MCG/ACT Inhaler Inhale 2 puffs into the lungs 2 times daily   camphor-menthol (DERMASARRA) 0.5-0.5 % LOTN Applied to skin 3 times day p.r.n. for itching   celecoxib (CELEBREX) 200 MG capsule Take 1 capsule (200 mg) by mouth daily   chlorthalidone (HYGROTON) 25 MG tablet Take one half tablet every morning.Discontinue the hydrochlorothiazide 12.5 mg   Cholecalciferol (VITAMIN D-3) 1000 UNITS CAPS Take 1,000 Units by mouth daily   cyclobenzaprine (FLEXERIL) 5 MG tablet Take one or two daily as needed for severe neck neck pain.   Dentifrices (BIOTENE DRY MOUTH) PSTE Apply 1 spray to affected area   fluticasone (FLONASE) 50 MCG/ACT nasal spray 2 sprays by Both Nostrils route daily.   fluticasone (FLOVENT HFA) 110 MCG/ACT inhaler Inhale 2 puffs into the lungs 2 times daily   gabapentin (NEURONTIN) 300 MG capsule Take 2 capsules (600 mg) by mouth 2 times daily   glipiZIDE (GLUCOTROL) 10 MG tablet Take 1 tablet (10 mg) by mouth 2 times daily   HYDROcodone-acetaminophen (NORCO) 5-325 MG per tablet Take 1 tablet by mouth every 4 hours as needed for pain (Patient not taking: Reported on 7/19/2019)   indomethacin (INDOCIN) 50 MG capsule Take 1 capsule (50 mg) by mouth 3 times daily as needed Use only during a gout attack   insulin glargine (BASAGLAR KWIKPEN) 100 UNIT/ML pen Inject 30 Units Subcutaneous 2 times daily   insulin glargine (LANTUS SOLOSTAR PEN) 100 UNIT/ML pen Inject 30 Units Subcutaneous 2 times daily   insulin pen needle (B-D U/F) 31G X 8 MM Use twice daily or as directed. *3 month supply   losartan (COZAAR) 100 MG tablet Take 1  tablet (100 mg) by mouth daily   metFORMIN (GLUCOPHAGE-XR) 500 MG 24 hr tablet Take 2 tablets in the morning and 2 tablets in the evening.   omeprazole (PRILOSEC) 40 MG capsule Take 1 capsule (40 mg) by mouth 2 times daily   saw palmetto 160 MG CAPS Take 160 mg by mouth daily   simethicone (MYLICON) 125 MG CHEW chewable tablet Take 1 tablet (125 mg) by mouth 4 times daily as needed for intestinal gas (Patient not taking: Reported on 7/19/2019)   simvastatin (ZOCOR) 20 MG tablet Take 1 tablet (20 mg) by mouth At Bedtime   solifenacin (VESICARE) 5 MG tablet Take 1 tablet (5 mg) by mouth daily   tamsulosin (FLOMAX) 0.4 MG capsule Take 1 capsule (0.4 mg) by mouth daily   tiotropium (SPIRIVA HANDIHALER) 18 MCG inhalation capsule Inhale contents of one capsule daily.            Allergies:     Allergies   Allergen Reactions     Morphine Hcl Other (See Comments)     Sweating      Seasonal Allergies Difficulty breathing     Sinus congestion, sneezing             Physical Exam:   /81   Pulse 111   Temp 97.8  F (36.6  C) (Oral)   Resp 21   Wt 93 kg (205 lb)   SpO2 97%   BMI 31.17 kg/m     GENERAL: Alert and oriented x 2 (he thought the year was 2003). NAD.   HEENT: Anicteric sclera. PERRL. Mucous membranes moist and without lesions.   CV: RRR. S1, S2. No murmurs appreciated.   RESPIRATORY: Effort normal. Lungs CTAB with rhonchi throughout and slight expiratory wheezing.    GI: Abdomen soft and non distended with normoactive bowel sounds present in all quadrants. No tenderness, rebound, guarding.   MUSCULOSKELETAL: No joint swelling or tenderness. Moves all extremities.   NEUROLOGICAL: No focal deficits. Strength 5/5 bilaterally in upper and lower extremities.   EXTREMITIES: No peripheral edema. Intact bilateral pedal pulses.   SKIN: No jaundice. No rashes.          Labs:   CBC:  Recent Labs   Lab Test 07/22/19  1055   WBC 18.7*   RBC 4.87   HGB 14.7   HCT 44.8   MCV 92   MCH 30.2   MCHC 32.8   RDW 13.1           CMP:  Recent Labs   Lab Test 07/22/19  1055  03/22/18  1229     --  142   POTASSIUM 4.0  --  3.6   CHLORIDE 104  --  111*   BRITTANY 9.3  --  8.7   CO2 23  --  20   BUN 34*  --  30   CR 1.60*   < > 1.66*   *  --  115*   AST  --   --  10   ALT  --   --  25   BILITOTAL  --   --  0.5   ALBUMIN  --   --  3.2*   PROTTOTAL  --   --  6.2*   ALKPHOS  --   --  60    < > = values in this interval not displayed.       INR:   Recent Labs   Lab Test 03/22/18  1229   INR 1.05            Imaging:   XR CHEST 2 VW7/22/2019 11:10 AM     INDICATION: sob     COMPARISON:  CT chest 9/9/2015     FINDINGS: PA and lateral views of the chest. Cardiac silhouette is  stable from prior CT. Calcifications of the pleura on the left dome of  the diaphragm similar to prior exam. Slightly obscured left  costodiaphragmatic angle. No pneumothorax. Upper abdomen is  unremarkable. No acute osseous lesions.                                                                      IMPRESSION: Small amount of left basilar atelectasis, otherwise no  acute cardiopulmonary findings.     I have personally reviewed the examination and initial interpretation  and I agree with the findings.     ALYSHA CANTRELL MD         Assessment and Plan:   Rolando Gonzalez is a 78 year old male w/PMH significant for osteoarthritis, RLL lung nodule, moderate persistent asthma, hypertension, hyperlipoidemia, GERD, gout, ED, diabetic neuropathy, DM type 2 who presented to the ED with neck pain.     1. Neck pain, pain between shoulder blades, possible CAP/bronchitis: In the ED the patient's vital signs showed HR was 102-111, /95, he was afebrile. Labs: BMP showed creatinine 1.60.  .  Procal 0.08.  Lactic acid 1.1.  Glucose 164.  Venous blood gas was unremarkable.  CBC with WBC 18.7 with left shift.  Chest x-ray showed small amount of left basilar atelectasis, otherwise no acute cardiopulmonary findings. EKG showed sinus tachycardia with RBBB (unchanged from  prior).  He was given 2g Rocephin IV, flexeril, doxycyline PO, dilaudid, duoneb, prednisone 40 mg PO, and lidoderm patch in the ED.  He was admitted to the observation unit for possible pneumonia treatment.   Patient continued to have pain on admission to the observation unit, intermittent and worse with movement or coughing.  Patient's son present in the room and reports that patient is slightly more confused than usual.    -admit to the observation unit  -continue doxycyline 100 mg po BID, rocephin IV  -pain control with: robaxin 4 times daily, tylenol scheduled, lidoderm patches,oxycodone prn for breakthrough pain-aqua K pad to neck  -duonebs Q 4 hours, albuterol nebs Q 2 hours prn  -continue prednisone 40 mg po daily  -NS at 50 ml/hour  -telemetry  -fall precautions  -PT/OT consult  -sputum culture  -repeat BMP, CBC in am  -follow blood cultures  -UA/UC      Chronic medical problems:  #moderate persistent asthma: duonebs Q 4 hours, albuterol Q 2 hours prn, prednisone 40 mg po daily, antibiotics as above, hold home inhalers given obs stay  #gout: continue PTA allopurinol  #DM type 2: blood sugars before meals and bedtime, continue PTA glipizide and metformin, Lantus 30 units at bedtime  #hypertension: continue PTA losartan, amlodipine  #GERD: continue PTA PPI  #diabetic neuropathy: continue PTA gabapentin  #CKD: creatinine at baseline 1.60, patient appears slightly dry, will give gentle IVF    Discussed with Dr. Strauss.     FEN: moderate CHO diet  Prophylaxis: anticipate short stay  Code Status: DNR/DNI        Stella Ahmadi, APRN, CNP  Ascom #[id number]

## 2019-07-22 NOTE — ED PROVIDER NOTES
[place] EMERGENCY DEPARTMENT ([facility])  July 22, 2019    History     Chief Complaint   Patient presents with     Muscle Pain     The history is provided by the patient and medical records.     Rolando Gonzalez is a 78 year old male with a past medical history significant for type 2 DM, diabetic nephropathy, COPD, asthma, left leg claudication (secondary to spinal stenosis), GERD, hyperlipidemia, and hypertension who presents to the Emergency Department for evaluation of neck pain. Patient reports posterior neck pain that is worse with movement. He denies numbness or tingling in his extremities or weakness. He has not noticed a recent fever and has not had chest pain. Patient does not have a history of heart problems. Per chart review, he was recently seen at [facility] on Friday (7/19) where he was diagnosed with bronchitis and given a 5-day course of azithromycin. He does report that he had a recent exacerbation of his asthma on Monday (7/15) and notes that he feels more short of breath when lying flat.      Current Facility-Administered Medications   Medication     Lidocaine (LIDOCARE) 4 % Patch 1 patch     lidocaine (PF) (XYLOCAINE) 1 % injection 5 mL     lidocaine patch in PLACE     lidocaine patch REMOVAL     triamcinolone acetonide (KENALOG-40) injection 40 mg     Current Outpatient Medications   Medication     albuterol (PROAIR HFA, PROVENTIL HFA, VENTOLIN HFA) 108 (90 BASE) MCG/ACT inhaler     allopurinol (ZYLOPRIM) 300 MG tablet     amLODIPine (NORVASC) 10 MG tablet     AMOXICILLIN PO     aspirin 81 MG tablet     azithromycin (ZITHROMAX) 250 MG tablet     blood glucose (ACCU-CHEK MERY) test strip     blood glucose monitoring (ACCU-CHEK MERY PLUS) meter device kit     budesonide-formoterol (SYMBICORT) 80-4.5 MCG/ACT Inhaler     camphor-menthol (DERMASARRA) 0.5-0.5 % LOTN     celecoxib (CELEBREX) 200 MG capsule     chlorthalidone (HYGROTON) 25 MG tablet     Cholecalciferol (VITAMIN  D-3) 1000 UNITS CAPS     cyclobenzaprine (FLEXERIL) 5 MG tablet     Dentifrices (BIOTENE DRY MOUTH) PSTE     fluticasone (FLONASE) 50 MCG/ACT nasal spray     fluticasone (FLOVENT HFA) 110 MCG/ACT inhaler     gabapentin (NEURONTIN) 300 MG capsule     glipiZIDE (GLUCOTROL) 10 MG tablet     HYDROcodone-acetaminophen (NORCO) 5-325 MG per tablet     indomethacin (INDOCIN) 50 MG capsule     insulin glargine (BASAGLAR KWIKPEN) 100 UNIT/ML pen     insulin glargine (LANTUS SOLOSTAR PEN) 100 UNIT/ML pen     insulin pen needle (B-D U/F) 31G X 8 MM     losartan (COZAAR) 100 MG tablet     metFORMIN (GLUCOPHAGE-XR) 500 MG 24 hr tablet     omeprazole (PRILOSEC) 40 MG capsule     saw palmetto 160 MG CAPS     simethicone (MYLICON) 125 MG CHEW chewable tablet     simvastatin (ZOCOR) 20 MG tablet     solifenacin (VESICARE) 5 MG tablet     tamsulosin (FLOMAX) 0.4 MG capsule     tiotropium (SPIRIVA HANDIHALER) 18 MCG inhalation capsule     Past Medical History:   Diagnosis Date     Asthma, moderate persistent      Degenerative tear of meniscus      Diabetes mellitus type 2, insulin dependent (H)      Diabetic neuropathy (H)     improved with gabapentin; normal EMG 1/26/18     Diverticulosis      ED (erectile dysfunction)      Edema extremities 2/15/16    venous insufficiency     GERD (gastroesophageal reflux disease)      Gout      Hyperlipidemia      Hypertension      Moderate persistent asthma 2017    M MD Preeti allergist.  FEV1 1.83. FEF 25-75 1.5 (3/13/17)     Osteoarthritis, shoulder     right     RLL lung nodule 03/20/2017    stable 8 mm rll nodule.  No additional CT testing needed.       Urge incontinence      Vasomotor rhinitis        Past Surgical History:   Procedure Laterality Date     CATARACT IOL, RT/LT      both eyes     L4-5 fusion         Family History   Problem Relation Age of Onset     Cancer Paternal Grandmother        Social History     Tobacco Use     Smoking status: Passive Smoke Exposure - Never Smoker      Smokeless tobacco: Never Used   Substance Use Topics     Alcohol use: Yes     Alcohol/week: 0.5 oz     Types: 1 Standard drinks or equivalent per week     Allergies   Allergen Reactions     Morphine Hcl Other (See Comments)     Sweating      Seasonal Allergies Difficulty breathing     Sinus congestion, sneezing       I have reviewed the Medications, Allergies, Past Medical and Surgical History, and Social History in the Epic system.    Review of Systems   Constitutional: Negative for fever.   Eyes: Negative for visual disturbance.   Respiratory: Positive for cough and shortness of breath (worse when lying flat).    Cardiovascular: Negative for chest pain and leg swelling.   Gastrointestinal: Negative for abdominal pain, nausea and vomiting.   Genitourinary: Negative for dysuria.   Musculoskeletal: Positive for neck pain.   Neurological: Negative for weakness, numbness and headaches.        Negative for paraesthesias   All other systems reviewed and are negative.      Physical Exam   BP: 146/75  Pulse: 102  Heart Rate: 101  Temp: 97.8  F (36.6  C)  Resp: 16  Weight: 93 kg (205 lb)  SpO2: 97 %      Physical Exam   Constitutional: He is oriented to person, place, and time. He appears well-developed and well-nourished. He appears distressed.   HENT:   Head: Normocephalic and atraumatic.   Nose: Nose normal.   Eyes: Pupils are equal, round, and reactive to light. Conjunctivae and EOM are normal. No scleral icterus.   Neck: Phonation normal. Muscular tenderness present. No spinous process tenderness present. No neck rigidity. No edema and no erythema present.       Tender over right SCM. Pain with rotation of neck to left shoulder. Palpable muscle spasm. No nuchal rigidity.    Cardiovascular: Regular rhythm, normal heart sounds and intact distal pulses. Tachycardia present.   No murmur heard.  Pulmonary/Chest: No stridor. No respiratory distress. He has decreased breath sounds. He has wheezes. He has rhonchi. He has no  rales.   Dry hacking cough, faint end-expiratory wheezes and rhonchi   Abdominal: Soft. He exhibits no distension. There is no tenderness. There is no rebound and no guarding.   Musculoskeletal: Normal range of motion. He exhibits no edema or deformity.   Neurological: He is alert and oriented to person, place, and time. No sensory deficit. He exhibits normal muscle tone.   Skin: Skin is warm and dry. No rash noted. He is not diaphoretic. No pallor.   Psychiatric: He has a normal mood and affect. His behavior is normal. Thought content normal.   Nursing note and vitals reviewed.      ED Course        Procedures             EKG Interpretation:      Interpreted by Lily Rico MD  Time reviewed: 10:27  Symptoms at time of EKG: neck pain   Rhythm: sinus tachycardia  Rate: 103 bpm  Axis: Normal  Ectopy: none  Conduction: right bundle branch block (incomplete)  ST Segments/ T Waves: No acute ischemic changes  Q Waves: none  Comparison to prior: 3/27/2017    Clinical Impression: abnormal EKG                Critical Care time:  none             Labs Ordered and Resulted from Time of ED Arrival Up to the Time of Departure from the ED   CBC WITH PLATELETS DIFFERENTIAL - Abnormal; Notable for the following components:       Result Value    WBC 18.7 (*)     Absolute Neutrophil 14.1 (*)     Absolute Monocytes 1.6 (*)     All other components within normal limits   BASIC METABOLIC PANEL - Abnormal; Notable for the following components:    Glucose 164 (*)     Urea Nitrogen 34 (*)     Creatinine 1.60 (*)     GFR Estimate 40 (*)     GFR Estimate If Black 47 (*)     All other components within normal limits   NT PROBNP INPATIENT   PROCALCITONIN   ROUTINE UA WITH MICROSCOPIC   PERIPHERAL IV CATHETER   ISTAT TROPONIN NURSING POCT   ISTAT CG4 GASES LACTATE CAROLINA NURSING POCT   CARDIAC CONTINUOUS MONITORING   ISTAT  GASES LACTATE CAROLINA POCT   TROPONIN POCT   BLOOD CULTURE   BLOOD CULTURE   URINE CULTURE AEROBIC BACTERIAL             Assessments & Plan (with Medical Decision Making)   Rolando Gonzalez is a 78 year old male with a past medical history significant for type 2 DM, diabetic nephropathy, COPD, asthma, left leg claudication (secondary to spinal stenosis), GERD, hyperlipidemia, and hypertension who presents to the Emergency Department for evaluation of neck pain in the setting of recent respiratory illness.    Ddx: PNA, URI, asthma exacerbation, torticollis, cervical radiculopathy     Patient nontoxic and no resp distress. Distressed from neck pain with palpable spasm. Given flexeril w/o relief. Given IV dilaudid and lidocaine patch.     WBC elevated. Patient almost finished with a course of azithromycin prescribed to him in clinic for respiratory infectious symptoms. Has not noted any improvement. I think neck pain may be related to tension from increased work of breathing with recent infection. No chest pain. Reproducible with palpation so do not think further w/u for dissection or other pathology indicated. CXR with atelectasis. Could be infiltrate. Will treat for CAP with ceftriaxone and doxycycline.  Added prednisone for wheezing/COPD. Blood cultures sent before abx. EKG and trop nonischemic. BNP wnl. Discussed with patient and family who would prefer patient be observed overnight for PNA and neck pain. Admitted ED obs for pain control and monitoring of resp status. Likely discharge in AM on PO abx if improved.    I have reviewed the nursing notes.    I have reviewed the findings, diagnosis, plan and need for follow up with the patient.       Medication List      There are no discharge medications for this visit.         Final diagnoses:   Community acquired pneumonia, unspecified laterality   Spasmodic torticollis   ISushila, am serving as a trained medical scribe to document services personally performed by Lily Rico MD, based on the provider's statements to me.   I, Lily Rico MD, was physically present and have  reviewed and verified the accuracy of this note documented by Sushila White.    7/22/2019   [facility], [place], EMERGENCY DEPARTMENT     Lily Rico MD  07/22/19 2544

## 2019-07-22 NOTE — ED TRIAGE NOTES
Pt arrived via car with c/o neck muscle spasm and pain. Pt also reports that he was started on a Z pack on Friday and continues to have fluid in his lungs. Pt has a history of asthma.

## 2019-07-23 ENCOUNTER — APPOINTMENT (OUTPATIENT)
Dept: CT IMAGING | Facility: CLINIC | Age: 79
End: 2019-07-23
Attending: EMERGENCY MEDICINE
Payer: MEDICARE

## 2019-07-23 ENCOUNTER — APPOINTMENT (OUTPATIENT)
Dept: PHYSICAL THERAPY | Facility: CLINIC | Age: 79
End: 2019-07-23
Attending: NURSE PRACTITIONER
Payer: MEDICARE

## 2019-07-23 LAB
ANION GAP SERPL CALCULATED.3IONS-SCNC: 10 MMOL/L (ref 3–14)
BACTERIA SPEC CULT: NO GROWTH
BASOPHILS # BLD AUTO: 0 10E9/L (ref 0–0.2)
BASOPHILS NFR BLD AUTO: 0.1 %
BUN SERPL-MCNC: 50 MG/DL (ref 7–30)
CALCIUM SERPL-MCNC: 8.7 MG/DL (ref 8.5–10.1)
CHLORIDE SERPL-SCNC: 106 MMOL/L (ref 94–109)
CO2 SERPL-SCNC: 20 MMOL/L (ref 20–32)
CREAT SERPL-MCNC: 1.98 MG/DL (ref 0.66–1.25)
DIFFERENTIAL METHOD BLD: ABNORMAL
EOSINOPHIL # BLD AUTO: 0 10E9/L (ref 0–0.7)
EOSINOPHIL NFR BLD AUTO: 0 %
ERYTHROCYTE [DISTWIDTH] IN BLOOD BY AUTOMATED COUNT: 13.1 % (ref 10–15)
GFR SERPL CREATININE-BSD FRML MDRD: 31 ML/MIN/{1.73_M2}
GLUCOSE BLDC GLUCOMTR-MCNC: 166 MG/DL (ref 70–99)
GLUCOSE BLDC GLUCOMTR-MCNC: 234 MG/DL (ref 70–99)
GLUCOSE BLDC GLUCOMTR-MCNC: 238 MG/DL (ref 70–99)
GLUCOSE BLDC GLUCOMTR-MCNC: 287 MG/DL (ref 70–99)
GLUCOSE BLDC GLUCOMTR-MCNC: 334 MG/DL (ref 70–99)
GLUCOSE SERPL-MCNC: 199 MG/DL (ref 70–99)
HCT VFR BLD AUTO: 38.7 % (ref 40–53)
HGB BLD-MCNC: 12.9 G/DL (ref 13.3–17.7)
IMM GRANULOCYTES # BLD: 0.1 10E9/L (ref 0–0.4)
IMM GRANULOCYTES NFR BLD: 0.6 %
LACTATE BLD-SCNC: 3.5 MMOL/L (ref 0.7–2)
LYMPHOCYTES # BLD AUTO: 1.9 10E9/L (ref 0.8–5.3)
LYMPHOCYTES NFR BLD AUTO: 13.9 %
MCH RBC QN AUTO: 30.6 PG (ref 26.5–33)
MCHC RBC AUTO-ENTMCNC: 33.3 G/DL (ref 31.5–36.5)
MCV RBC AUTO: 92 FL (ref 78–100)
MONOCYTES # BLD AUTO: 1 10E9/L (ref 0–1.3)
MONOCYTES NFR BLD AUTO: 6.9 %
NEUTROPHILS # BLD AUTO: 10.8 10E9/L (ref 1.6–8.3)
NEUTROPHILS NFR BLD AUTO: 78.5 %
NRBC # BLD AUTO: 0 10*3/UL
NRBC BLD AUTO-RTO: 0 /100
PLATELET # BLD AUTO: 249 10E9/L (ref 150–450)
POTASSIUM SERPL-SCNC: 4 MMOL/L (ref 3.4–5.3)
RBC # BLD AUTO: 4.21 10E12/L (ref 4.4–5.9)
SODIUM SERPL-SCNC: 136 MMOL/L (ref 133–144)
SPECIMEN SOURCE: NORMAL
TROPONIN I SERPL-MCNC: <0.015 UG/L (ref 0–0.04)
TROPONIN I SERPL-MCNC: <0.015 UG/L (ref 0–0.04)
WBC # BLD AUTO: 13.8 10E9/L (ref 4–11)

## 2019-07-23 PROCEDURE — 00000146 ZZHCL STATISTIC GLUCOSE BY METER IP

## 2019-07-23 PROCEDURE — 93005 ELECTROCARDIOGRAM TRACING: CPT

## 2019-07-23 PROCEDURE — 97162 PT EVAL MOD COMPLEX 30 MIN: CPT | Mod: GP

## 2019-07-23 PROCEDURE — 99225 ZZC SUBSEQUENT OBSERVATION CARE,LEVEL II: CPT | Mod: Z6 | Performed by: NURSE PRACTITIONER

## 2019-07-23 PROCEDURE — 40000141 ZZH STATISTIC PERIPHERAL IV START W/O US GUIDANCE

## 2019-07-23 PROCEDURE — 70450 CT HEAD/BRAIN W/O DYE: CPT

## 2019-07-23 PROCEDURE — G0378 HOSPITAL OBSERVATION PER HR: HCPCS

## 2019-07-23 PROCEDURE — 84484 ASSAY OF TROPONIN QUANT: CPT | Performed by: NURSE PRACTITIONER

## 2019-07-23 PROCEDURE — 80048 BASIC METABOLIC PNL TOTAL CA: CPT | Performed by: NURSE PRACTITIONER

## 2019-07-23 PROCEDURE — 36415 COLL VENOUS BLD VENIPUNCTURE: CPT | Performed by: NURSE PRACTITIONER

## 2019-07-23 PROCEDURE — 85025 COMPLETE CBC W/AUTO DIFF WBC: CPT | Performed by: NURSE PRACTITIONER

## 2019-07-23 PROCEDURE — 25800030 ZZH RX IP 258 OP 636: Performed by: NURSE PRACTITIONER

## 2019-07-23 PROCEDURE — 84484 ASSAY OF TROPONIN QUANT: CPT | Mod: 91 | Performed by: NURSE PRACTITIONER

## 2019-07-23 PROCEDURE — 72125 CT NECK SPINE W/O DYE: CPT

## 2019-07-23 PROCEDURE — 83605 ASSAY OF LACTIC ACID: CPT | Performed by: EMERGENCY MEDICINE

## 2019-07-23 PROCEDURE — 25000131 ZZH RX MED GY IP 250 OP 636 PS 637: Mod: GY | Performed by: NURSE PRACTITIONER

## 2019-07-23 PROCEDURE — 84100 ASSAY OF PHOSPHORUS: CPT | Performed by: NURSE PRACTITIONER

## 2019-07-23 PROCEDURE — 97530 THERAPEUTIC ACTIVITIES: CPT | Mod: GP

## 2019-07-23 PROCEDURE — 96376 TX/PRO/DX INJ SAME DRUG ADON: CPT

## 2019-07-23 PROCEDURE — 25000125 ZZHC RX 250: Performed by: NURSE PRACTITIONER

## 2019-07-23 PROCEDURE — 93010 ELECTROCARDIOGRAM REPORT: CPT | Mod: 76 | Performed by: INTERNAL MEDICINE

## 2019-07-23 PROCEDURE — 94640 AIRWAY INHALATION TREATMENT: CPT

## 2019-07-23 PROCEDURE — 94640 AIRWAY INHALATION TREATMENT: CPT | Mod: 76

## 2019-07-23 PROCEDURE — 25000131 ZZH RX MED GY IP 250 OP 636 PS 637: Mod: GY | Performed by: PHYSICIAN ASSISTANT

## 2019-07-23 PROCEDURE — 40000802 ZZH SITE CHECK

## 2019-07-23 PROCEDURE — 83605 ASSAY OF LACTIC ACID: CPT

## 2019-07-23 PROCEDURE — 25000132 ZZH RX MED GY IP 250 OP 250 PS 637: Mod: GY | Performed by: NURSE PRACTITIONER

## 2019-07-23 PROCEDURE — 96361 HYDRATE IV INFUSION ADD-ON: CPT

## 2019-07-23 PROCEDURE — 96372 THER/PROPH/DIAG INJ SC/IM: CPT

## 2019-07-23 PROCEDURE — 40000275 ZZH STATISTIC RCP TIME EA 10 MIN

## 2019-07-23 PROCEDURE — 83735 ASSAY OF MAGNESIUM: CPT | Performed by: NURSE PRACTITIONER

## 2019-07-23 PROCEDURE — 25000128 H RX IP 250 OP 636: Performed by: NURSE PRACTITIONER

## 2019-07-23 RX ORDER — IPRATROPIUM BROMIDE AND ALBUTEROL SULFATE 2.5; .5 MG/3ML; MG/3ML
3 SOLUTION RESPIRATORY (INHALATION)
Status: DISCONTINUED | OUTPATIENT
Start: 2019-07-24 | End: 2019-07-23

## 2019-07-23 RX ORDER — IPRATROPIUM BROMIDE AND ALBUTEROL SULFATE 2.5; .5 MG/3ML; MG/3ML
3 SOLUTION RESPIRATORY (INHALATION) EVERY 4 HOURS PRN
Status: DISCONTINUED | OUTPATIENT
Start: 2019-07-24 | End: 2019-07-24 | Stop reason: HOSPADM

## 2019-07-23 RX ADMIN — GABAPENTIN 600 MG: 300 CAPSULE ORAL at 08:57

## 2019-07-23 RX ADMIN — PREDNISONE 40 MG: 20 TABLET ORAL at 08:57

## 2019-07-23 RX ADMIN — TOLTERODINE 2 MG: 2 CAPSULE, EXTENDED RELEASE ORAL at 08:58

## 2019-07-23 RX ADMIN — LIDOCAINE 2 PATCH: 560 PATCH PERCUTANEOUS; TOPICAL; TRANSDERMAL at 08:55

## 2019-07-23 RX ADMIN — OMEPRAZOLE 40 MG: 20 CAPSULE, DELAYED RELEASE ORAL at 17:19

## 2019-07-23 RX ADMIN — ALLOPURINOL 300 MG: 300 TABLET ORAL at 08:58

## 2019-07-23 RX ADMIN — GLIPIZIDE 10 MG: 10 TABLET ORAL at 08:57

## 2019-07-23 RX ADMIN — SIMVASTATIN 20 MG: 20 TABLET, FILM COATED ORAL at 22:20

## 2019-07-23 RX ADMIN — OMEPRAZOLE 40 MG: 20 CAPSULE, DELAYED RELEASE ORAL at 07:23

## 2019-07-23 RX ADMIN — METFORMIN HYDROCHLORIDE 1000 MG: 500 TABLET, EXTENDED RELEASE ORAL at 08:56

## 2019-07-23 RX ADMIN — METHOCARBAMOL 500 MG: 500 TABLET, FILM COATED ORAL at 08:57

## 2019-07-23 RX ADMIN — ASPIRIN 81 MG: 81 TABLET, COATED ORAL at 08:57

## 2019-07-23 RX ADMIN — IPRATROPIUM BROMIDE AND ALBUTEROL SULFATE 3 ML: .5; 3 SOLUTION RESPIRATORY (INHALATION) at 19:13

## 2019-07-23 RX ADMIN — ACETAMINOPHEN 650 MG: 325 TABLET, FILM COATED ORAL at 19:57

## 2019-07-23 RX ADMIN — INSULIN ASPART 4 UNITS: 100 INJECTION, SOLUTION INTRAVENOUS; SUBCUTANEOUS at 17:19

## 2019-07-23 RX ADMIN — INSULIN ASPART 2 UNITS: 100 INJECTION, SOLUTION INTRAVENOUS; SUBCUTANEOUS at 12:22

## 2019-07-23 RX ADMIN — DOXYCYCLINE 100 MG: 100 INJECTION, POWDER, LYOPHILIZED, FOR SOLUTION INTRAVENOUS at 22:21

## 2019-07-23 RX ADMIN — METHOCARBAMOL 500 MG: 500 TABLET, FILM COATED ORAL at 12:22

## 2019-07-23 RX ADMIN — METHOCARBAMOL 500 MG: 500 TABLET, FILM COATED ORAL at 17:19

## 2019-07-23 RX ADMIN — SODIUM CHLORIDE 500 ML: 9 INJECTION, SOLUTION INTRAVENOUS at 21:28

## 2019-07-23 RX ADMIN — IPRATROPIUM BROMIDE AND ALBUTEROL SULFATE 3 ML: .5; 3 SOLUTION RESPIRATORY (INHALATION) at 16:50

## 2019-07-23 RX ADMIN — GLIPIZIDE 10 MG: 10 TABLET ORAL at 17:18

## 2019-07-23 RX ADMIN — INSULIN ASPART 1 UNITS: 100 INJECTION, SOLUTION INTRAVENOUS; SUBCUTANEOUS at 08:19

## 2019-07-23 RX ADMIN — METFORMIN HYDROCHLORIDE 1000 MG: 500 TABLET, EXTENDED RELEASE ORAL at 17:18

## 2019-07-23 RX ADMIN — CHLORTHALIDONE 12.5 MG: 25 TABLET ORAL at 08:57

## 2019-07-23 RX ADMIN — AMLODIPINE BESYLATE 10 MG: 10 TABLET ORAL at 08:58

## 2019-07-23 RX ADMIN — CEFTRIAXONE SODIUM 2 G: 2 INJECTION, POWDER, FOR SOLUTION INTRAMUSCULAR; INTRAVENOUS at 13:54

## 2019-07-23 RX ADMIN — ACETAMINOPHEN 650 MG: 325 TABLET, FILM COATED ORAL at 07:31

## 2019-07-23 RX ADMIN — ACETAMINOPHEN 650 MG: 325 TABLET, FILM COATED ORAL at 14:00

## 2019-07-23 RX ADMIN — IPRATROPIUM BROMIDE AND ALBUTEROL SULFATE 3 ML: .5; 3 SOLUTION RESPIRATORY (INHALATION) at 09:03

## 2019-07-23 RX ADMIN — DOXYCYCLINE 100 MG: 100 INJECTION, POWDER, LYOPHILIZED, FOR SOLUTION INTRAVENOUS at 10:07

## 2019-07-23 RX ADMIN — LOSARTAN POTASSIUM 100 MG: 100 TABLET, FILM COATED ORAL at 08:56

## 2019-07-23 RX ADMIN — ACETAMINOPHEN 650 MG: 325 TABLET, FILM COATED ORAL at 10:07

## 2019-07-23 RX ADMIN — INSULIN ASPART 2 UNITS: 100 INJECTION, SOLUTION INTRAVENOUS; SUBCUTANEOUS at 03:15

## 2019-07-23 RX ADMIN — IPRATROPIUM BROMIDE AND ALBUTEROL SULFATE 3 ML: .5; 3 SOLUTION RESPIRATORY (INHALATION) at 12:52

## 2019-07-23 RX ADMIN — GABAPENTIN 600 MG: 300 CAPSULE ORAL at 19:57

## 2019-07-23 RX ADMIN — TAMSULOSIN HYDROCHLORIDE 0.4 MG: 0.4 CAPSULE ORAL at 08:57

## 2019-07-23 RX ADMIN — SODIUM CHLORIDE: 9 INJECTION, SOLUTION INTRAVENOUS at 17:18

## 2019-07-23 RX ADMIN — ACETAMINOPHEN 650 MG: 325 TABLET, FILM COATED ORAL at 02:29

## 2019-07-23 RX ADMIN — METHOCARBAMOL 500 MG: 500 TABLET, FILM COATED ORAL at 19:57

## 2019-07-23 ASSESSMENT — PAIN DESCRIPTION - DESCRIPTORS: DESCRIPTORS: ACHING

## 2019-07-23 NOTE — PROGRESS NOTES
"   07/23/19 0853   Quick Adds   Type of Visit Initial PT Evaluation       Present no   Language English   Living Environment   Lives With alone   Living Arrangements house   Home Accessibility no concerns   Transportation Anticipated car, drives self   Living Environment Comment Patient a questionable historian.  Patient reports having a cleaning service to assist with household cleaning and goes out for all meals.    Self-Care   Usual Activity Tolerance good   Current Activity Tolerance fair   Regular Exercise No   Equipment Currently Used at Home none   Functional Level Prior   Ambulation 0-->independent   Transferring 0-->independent   Toileting 0-->independent   Bathing 0-->independent   Communication 0-->understands/communicates without difficulty   Swallowing 0-->swallows foods/liquids without difficulty   Cognition 0 - no cognition issues reported   Fall history within last six months no   Which of the above functional risks had a recent onset or change? none   General Information   Onset of Illness/Injury or Date of Surgery - Date 07/22/19   Referring Physician Stella Ahmadi, LORI CNP   Pertinent History of Current Problem (include personal factors and/or comorbidities that impact the POC) Rolando Gonzalez is a 78 year old male w/PMH significant for osteoarthritis, RLL lung nodule, moderate persistent asthma, hypertension, hyperlipoidemia, GERD< gout, ED, diabetic neuropathy, DM type 2 who presented to the ED with neck pain.  Per ER MD, \"Patient reports posterior neck pain that is worse with movement. He denies numbness or tingling in his extremities or weakness. He has not noticed a recent fever and has not had chest pain. Patient does not have a history of heart problems. Per chart review, he was recently seen at [facility] on Friday (7/19) where he was diagnosed with bronchitis and given a 5-day course of azithromycin. He does report that he had a recent exacerbation of " "his asthma on Monday (7/15) and notes that he feels more short of breath when lying flat.\"   Precautions/Limitations fall precautions   Weight-Bearing Status - LUE full weight-bearing   Weight-Bearing Status - RUE full weight-bearing   Weight-Bearing Status - LLE full weight-bearing   Weight-Bearing Status - RLE full weight-bearing   General Info Comments Activity: ambulate with assist   Cognitive Status Examination   Orientation person;place   Level of Consciousness alert   Follows Commands and Answers Questions 100% of the time   Personal Safety and Judgment at risk behaviors demonstrated   Memory intact   Pain Assessment   Patient Currently in Pain Yes, see Vital Sign flowsheet   Integumentary/Edema   Integumentary/Edema no deficits were identifed   Posture    Posture Forward head position;Protracted shoulders   Range of Motion (ROM)   ROM Comment B LE WNL/WFL   Strength   Strength Comments B LE > 3/5 secondary to functional mobility   Bed Mobility   Bed Mobility Comments SBA   Transfer Skills   Transfer Comments CGA   Gait   Gait Comments No AD + Min A/CGA   Balance   Balance Comments Noted pathway deviations and gait unsteadiness with turning and dynamic balance challenges.    Sensory Examination   Sensory Perception no deficits were identified   Coordination   Coordination no deficits were identified   Muscle Tone   Muscle Tone no deficits were identified   Clinical Impression   Criteria for Skilled Therapeutic Intervention yes, treatment indicated   PT Diagnosis Impaired functional mobility   Influenced by the following impairments Impaired balance, pain    Functional limitations due to impairments Impaired bed mobility, transfers, gait   Clinical Presentation Evolving/Changing   Clinical Presentation Rationale clinical judgement; 2+ personal factors/body systems involved.    Clinical Decision Making (Complexity) Moderate complexity   Predicted Duration of Therapy Intervention (days/wks) 7/25/19   Anticipated " "Discharge Disposition Transitional Care Facility   Risk & Benefits of therapy have been explained Yes   Patient, Family & other staff in agreement with plan of care Yes   [facility] AM-PAC TM \"6 Clicks\"   2016, Trustees of [facility], under license to [employer].  All rights reserved.   6 Clicks Short Forms Basic Mobility Inpatient Short Form   [facility] AM-PAC  \"6 Clicks\" V.2 Basic Mobility Inpatient Short Form   1. Turning from your back to your side while in a flat bed without using bedrails? 4 - None   2. Moving from lying on your back to sitting on the side of a flat bed without using bedrails? 4 - None   3. Moving to and from a bed to a chair (including a wheelchair)? 3 - A Little   4. Standing up from a chair using your arms (e.g., wheelchair, or bedside chair)? 3 - A Little   5. To walk in hospital room? 3 - A Little   6. Climbing 3-5 steps with a railing? 2 - A Lot   Basic Mobility Raw Score (Score out of 24.Lower scores equate to lower levels of function) 19   Total Evaluation Time   Total Evaluation Time (Minutes) 15     "

## 2019-07-23 NOTE — PLAN OF CARE
[]Hide copied text    []Joslyn for details  Observation goals PRIOR TO DISCHARGE     Comments: List all goals to be met before discharge home:   - Dyspnea improved and oxygen saturations greater than 88% on room air or prior home oxygen levels  yes   - Tolerates oral antibiotics or has plans for home infusion set up.   no  - Vital signs normal or at patient baseline yes  - Infection is improving WBC improved to 13  - Return to baseline functional status no.  Tcu recs pending  - Safe disposition plan has been identified no pending  - Nurse to notify provider when observation goals have been met and patient is ready for discharge.            Shift:  0902-1734  VS: Pain:    /63   Pulse 93   Temp 97.3  F (36.3  C) (Oral)   Resp 16   Wt 93 kg (205 lb)   SpO2 94%   BMI 31.17 kg/m    Pt cont to complain of neck pain.  Adequate relief with scheduled tylenol, repositioning and ice.  Pt has oxycodone available.   Neuro:  neuros intact.  Pt forgetful, and disoriented to time.  Cardiac:  wdl.  Occasionally tachy with HR documented at 108.  Pt on tele, no other ectopy noted.  Order parameters instruct to notify MD if HR greater than 120.  Pt asymptomatic.  Denies lightheadedness, dizziness.  Respiratory:   LS wheezy.  On RA.  Sating upper 90s.  Sputum sample needed.  Infrequent non-productive cough.  Continues on IV abx.  Has scheduled nebs q 4 h.    GI/Diet/Appetite:   BS audible.  No BM this shift.  Denies nausea, bloating abd discomfort.  Pt unable to recall date of LBM.  abd soft.  :   300 recorded UO.  Pt is occasionally incontinent/dribbles urine.  Wearing brief.  Unrecorded UO x 2.  UO tea colored.  LDA's:   PIV RUE.  Infusing NS @ 50ml/hour.  Skin:Activity:   Skin WNL.  Pt wearing brief.  Up with SBA.  Bed alarm is on d/t confusion.  Pt has not been impulsive this shift.  But is agreeable to alarm  Tests/Procedures:   AC BG @ 1700 =330.  4 units of insulin given with dinner.  AM WBC improved to  13.8.  Pertinent Labs/Lab Collection:        Plan:  Plan for transfer to TCU tomorrow.

## 2019-07-23 NOTE — PLAN OF CARE
Observation goals PRIOR TO DISCHARGE     Comments: List all goals to be met before discharge home:   - Dyspnea improved and oxygen saturations greater than 88% on room air or prior home oxygen levels  yes   - Tolerates oral antibiotics or has plans for home infusion set up.   no  - Vital signs normal or at patient baseline yes  - Infection is improving WBC improved to 13  - Return to baseline functional status no.  Tcu recs pending  - Safe disposition plan has been identified no pending  - Nurse to notify provider when observation goals have been met and patient is ready for discharge.

## 2019-07-23 NOTE — PROGRESS NOTES
Observation goals PRIOR TO DISCHARGE     Comments: List all goals to be met before discharge home:   - Dyspnea improved and O2 sats greater than 90% on room air or prior home oxygen levels: Yes   - Tolerates oral antibiotics or has plans for home infusion set up. no  - Vital signs normal or at patient baseline yes  - Infection is improving: No; WBC 13.8 this am  - Return to baseline functional status: No; patient pleasantly confused   - Safe disposition plan has been identified: No  - Nurse to notify provider when observation goals have been met and patient is ready for discharge

## 2019-07-23 NOTE — PROGRESS NOTES
"Social Work: Assessment with Discharge Plan    Patient Name:  Rolando Gonzalez  :  1940  Age:  78 year old  MRN:  [id number]  Completed assessment with:  Pt at bedside, spoke w/ Naseem (P:[phone])    Presenting Information   Reason for Referral:  Discharge plan  Date of Intake:  2019  Referral Source:  Chart Review  Decision Maker:  Pt at baseline - however, appears to be a poor historian  Alternate Decision Maker:  Pt requested friend Naseem (P: [phone]) be contacted. At times Naseem has been referred to as a son; however he clarified with SW today that he is \"like a son\" and manages his affairs and \"works for him.\"    Health Care Directive:  Declined completing  Living Situation:  Accessible [facility] alone   Previous Functional Status:  Independent - per Naseem, Pt goes out for all meals and has a cleaning service.  Pt continues to drive independently and race his horses. Per Naseem, no evidence of safety concerns in the home or with driving but has mild cognitive impairment over the last year or two. Pt has a dtr Sari, edgar, and Naseem involved.  Patient and family understanding of hospitalization:  Pt's states he was admitted for neck pain. Team reports workup for pneumonia  Cultural/Language/Spiritual Considerations:  Pt is 79 yo  single male with no Evangelical identified.   Adjustment to Illness:  Pt states \"I'm doing ok, I'm going to get stronger and probably leave tomorrow.\" Pt states he has been to a rehab unit before (though unable to identify which one) and he will not go to one.     Physical Health  Reason for Admission:    1. Community acquired pneumonia, unspecified laterality    2. Spasmodic torticollis      Services Needed/Recommended:  TCU    Mental Health/Chemical Dependency  Diagnosis:  Neck pain, pneumonia  Support/Services in Place:  N/A  Services Needed/Recommended:  TCU    Support System  Significant relationship at present time: Friend Naseem  Family of origin is " available for support:  Evangelina Guo and grandson  Other support available:  Private pay cleaning services. Family/friend reports he has the finances to hire pvt pay home care or go to TCU pvt pay if needed  Gaps in support system:  Mild cognitive impairment and Pt lives alone  Patient is caregiver to:  None     Provider Information   Primary Care Physician:  Denise Russ   [phone]   Clinic:  [address] [place] [id number]      :  None    Financial   Income Source:  Not discussed in depth, Pt and family both report he has property and finances indicating no concern  Financial Concerns:  No concerns.   Insurance:    Payor/Plan Subscriber Name Rel Member # Group #   MEDICARE - MEDICARE ZAY SORIANO  [id number]       ATTN CLAIMS, [address]   BCBS - BCBS OF ZAY OSEGUERA [facility] [id number]      [address] [address]       Discharge Plan   Patient and family discharge goal:  Pt's goal is to return home and decline TCU. Family is looking at all options (home w/ home care and/or private pay vs TCU)  Provided education on discharge plan:  YES  Patient agreeable to discharge plan:  TBD  A list of Medicare Certified Facilities was provided to the patient and/or family to encourage patient choice. Patient's choices for facility are:  TBD  Will NH provide Skilled rehabilitation or complex medical:  YES  General information regarding anticipated insurance coverage and possible out of pocket cost was discussed. Patient and patient's family are aware patient may incur the cost of transportation to the facility, pending insurance payment: YES  Barriers to discharge:  Medical stability, rehab placement, safe dispo plan    Discharge Recommendations   Anticipated Disposition:  TBD  Transportation Needs:  Family:  Naseem or evangelina Guo  Name of Transportation Company and Phone:  [employer] (Ph: [phone]) if needed.    Additional comments   SW scheduled to meet with Pt and  "family friend Naseem at bedside together to discuss discharge planning options. SW to sent TCU referrals at that point per Pt consent.    Addendum: SW met w/ Pt, Pt's friend Naseem, and sig other at bedside this afternoon. Patient is now agreeable to TCU for a private pay rehab stay. Pt reports experiencing new equilibrium issues \"over the last six months\" and that he knew he needed increased assistance or help what was not aware of what to do about it. Pt's friend Naseem reports he will transport Pt when he is ready discharge to TCU. Pt gave SW consent to send referrals to the following TCUs:  1. [facility] (P: [phone]) - spoke w/ Germaine/Admissions no male bed openings \"for at least a week  2. [facility] (P:[phone]) - left    3. [facility] (P: [phone]) - spoke w/ Bela in Admissions; she has referral and will review.     SW sent referrals and made follow up phone calls this afternoon. Pt is aware it would be a private pay stay and is agreeable.     JADEN Sheffield, CLAUDE  7C Surgical/Oncology Unit   Phone: [phone]  Pager: [phone]    "

## 2019-07-23 NOTE — PLAN OF CARE
PT / 6D -     Discharge Planner PT   Patient plan for discharge: agreeable to TCU  Current status: PT evaluation completed.  Demonstrating lying>sitting transfer with elevated HOB.  Transferred sit>stand + CGA.  Ambulated in hallway no AD + Rom/CGA demonstrating noted pathway deviations and gait instability with dynamic balance challenges.  Oriented to person and place, not oriented to time - reports this is normal and utilizes phone for date/year.   Barriers to return to prior living situation: lives alone, falls risk, impaired cognition, balance impairment,   Recommendations for discharge: TCU  Rationale for recommendations: Geovanny reports understanding concern for falls risk if he were to return home at this time secondary to impaired functional mobility compared to baseline.  Geovanny would benefit from continued skilled PT to address neck pain, improved posture/positioning, and higher level balance to improve overall pain and safety with gait.        Entered by: Nichole Uriarte 07/23/2019 9:05 AM

## 2019-07-23 NOTE — PROGRESS NOTES
[facility], [facility] Progress Note - Emergency Department Observation Unit       Date of Admission:  7/22/2019  Assessment & Plan   Rolando Gonzalez is a 78 year old male w/PMH significant for osteoarthritis, RLL lung nodule, moderate persistent asthma, hypertension, hyperlipoidemia, GERD, gout, ED, diabetic neuropathy, DM type 2 who presented to the ED with neck pain.      1. CAP/bronchitis:  HR was 102-115, /63, he was afebrile. Labs: BMP showed creatinine <0.015  .  Procal 0.08.  Lactic acid 1.1.  Glucose 164.  Venous blood gas was unremarkable.  CBC with WBC 13.8. Chest x-ray showed small amount of left basilar atelectasis, otherwise no acute cardiopulmonary findings. EKG showed sinus tachycardia with RBBB (unchanged from prior).  He was given 2g Rocephin IV, flexeril, doxycyline PO, dilaudid, duoneb, prednisone 40 mg PO, and lidoderm patch in the ED.  He was admitted to the observation unit for possible pneumonia treatment. UA negative. BC NTD  -continue doxycyline 100 mg po BID, rocephin IV  - Duonebs Q 4 hours, albuterol nebs Q 2 hours prn  -continue prednisone 40 mg po daily  -NS at 50 ml/hour  -telemetry  -sputum culture  -repeat CMP, CBC in am     2. Neck pain:Patient reports posterior neck pain that is worse with movement. He denies numbness or tingling in his extremities or weakness.  CT cervical spine: No acute fracture or traumatic subluxation. Cervical spondylosis as above with multilevel neural foraminal stenosis. There is thickening and calcification within the soft tissue  posterior to the odontoid process causing mild spinal canal stenosis at this level which has progressed from 5/22/2008. This can be seen with rheumatoid arthritis. PT consulted and recommended TCU   -pain control with: robaxin 4 times daily, tylenol scheduled, lidoderm patches,oxycodone prn for breakthrough pain-aqua K pad to neck.  -fall precautions  - SW consult for TCU  placement      3. Confusion: Patient's family and friends in the room and reported patient appeared more confused than usual. Patient had head CT on 09/09/2015 because of his memory loss.  Findings of symmetric ventriculomegaly involving the lateral and third ventricles out of proportion to the cortical sulci.  These findings can be seen in normal pressure hydrocephalus.  There are findings of 3 mm colloid cyst at the roof of the third ventricle and findings of moderate small vessel ischemic changes. He was seen in the neurology clinic in 2015 and the physician recommended MOCA testing.  MOCA attempted, patient declined MOCA.  Neurologist also recommended neurocognitive study which does not appear it was completed. CT head today showed No acute intracranial pathology. Symmetric unobstructed ventriculomegaly of the lateral and third ventricles, disproportionate to the sulci, unchanged compared to 9/9/2015. This is suggestive of normal pressure hydrocephalus. 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  - OT while patient here  - Outpatient neurocognitive study.     Chronic medical problems:  #moderate persistent asthma: duonebs Q 4 hours, albuterol Q 2 hours prn, prednisone 40 mg po daily, antibiotics as above, hold home inhalers given obs stay  #gout: continue PTA allopurinol  #DM type 2: blood sugars before meals and bedtime, continue PTA glipizide and metformin, Lantus 30 units at bedtime  #hypertension: continue PTA losartan, amlodipine  #GERD: continue PTA PPI  #diabetic neuropathy: continue PTA gabapentin  #CKD: creatinine at baseline 1.60, patient appears slightly dry, will give gentle IVF    Diet: Moderate Consistent CHO Diet    DVT Prophylaxis: Low Risk/Ambulatory with no VTE prophylaxis indicated  Menjivar Catheter: not present  Code Status: DNR/DNI      Disposition Plan   Expected discharge: Tomorrow, recommended to transitional care unit once safe disposition plan/ TCU bed  available.  Entered: LORI Elliott CNP 07/23/2019, 5:54 PM       The patient's care was discussed with the Attending Physician, Dr. Graves, Bedside Nurse, Care Coordinator/ and Patient.    LORI Elliott CNP  _________________    Interval History   NO events overnight     Data reviewed today: I reviewed all medications, new labs and imaging results over the last 24 hours.      Physical Exam   Vital Signs: Temp: 97.3  F (36.3  C) Temp src: Oral BP: 117/63 Pulse: 93 Heart Rate: 103 Resp: 16 SpO2: 94 % O2 Device: None (Room air)    Weight: 205 lbs 0 oz  GENERAL: Alert and oriented x 2 (he thought the year was 2009). NAD.   HEENT: Anicteric sclera. PERRL. Mucous membranes moist and without lesions.   CV: RRR. S1, S2. No murmurs appreciated.   RESPIRATORY: Effort normal. Lungs CTAB with rhonchi throughout and slight expiratory wheezing.    GI: Abdomen soft and non distended with normoactive bowel sounds present in all quadrants. No tenderness, rebound, guarding.   MUSCULOSKELETAL: No joint swelling or tenderness. Moves all extremities.   NEUROLOGICAL: No focal deficits. Strength 5/5 bilaterally in upper and lower extremities.   EXTREMITIES: No peripheral edema. Intact bilateral pedal pulses.   SKIN: No jaundice. No rashes    Data   Recent Labs   Lab 07/23/19  1333 07/23/19  0545 07/22/19  1055 07/22/19  1054   WBC  --  13.8* 18.7*  --    HGB  --  12.9* 14.7  --    MCV  --  92 92  --    PLT  --  249 280  --    NA  --  136 138  --    POTASSIUM  --  4.0 4.0  --    CHLORIDE  --  106 104  --    CO2  --  20 23  --    BUN  --  50* 34*  --    CR  --  1.98* 1.60*  --    ANIONGAP  --  10 10  --    BRITTANY  --  8.7 9.3  --    GLC  --  199* 164*  --    TROPI <0.015 <0.015  --   --    TROPONIN  --   --   --  0.00     Recent Results (from the past 24 hour(s))   Cervical spine CT w/o contrast    Narrative    CT CERVICAL SPINE W/O CONTRAST 7/23/2019 1:54 PM    Provided History: Neck pain, initial exam; neck  pain r/o DJD    Comparison: MR cervical spine 5/22/2018, CT head 9/9/2015    Technique: Using multidetector thin collimation helical acquisition  technique, axial, coronal and sagittal CT images through the cervical  spine were obtained without intravenous contrast.     Findings:  Multilevel cervical spondylosis with straightening of the normal  lordotic curvature. Minimal grade 1 degenerative retrolisthesis of C2  on C3. No acute fracture or subluxation. No prevertebral edema. There  is disc space narrowing most pronounced at C3-4 and C6-7. The findings  on a level by level basis are as follows:    C1-2: Thickening and calcification within the soft tissue posterior to  the odontoid process. This is causing mild spinal canal stenosis at  this level and has progressed from 5/22/2008 MRI.     C2-3: Left posterior osteophytic spurring and facet hypertrophy left  greater than right causing moderate left neural foraminal stenosis. No  spinal canal stenosis.    C3-4:  Mild posterior osteophytic spurring with mild bilateral neural  foraminal stenosis. There is also anterior osteophytic spurring. No  spinal canal stenosis.    C4-5:  Mild posterior osteophytic spurring with facet hypertrophy  right greater than left. Moderate right neural foraminal stenosis. No  spinal canal stenosis.    C5-6:  Left facet hypertrophy with mild bilateral neural foraminal  stenosis. No spinal canal stenosis.    C6-7:  Mild facet hypertrophy with mild neuroforaminal stenosis  bilaterally. No spinal canal stenosis.    C7-T1:  Mild right facet hypertrophy causing mild right neural  foraminal stenosis. No left neural foraminal stenosis. No spinal canal  stenosis.     No additional abnormality of the paraspinous soft tissues.      Impression    Impression:   1. No acute fracture or traumatic subluxation.  2. Cervical spondylosis as above with multilevel neural foraminal  stenosis. There is thickening and calcification within the soft  tissue  posterior to the odontoid process causing mild spinal canal stenosis  at this level which has progressed from 5/22/2008. This can be seen  with rheumatoid arthritis.    I have personally reviewed the examination and initial interpretation  and I agree with the findings.    DALILA BOLTON MD   Head CT w/o contrast    Narrative    CT HEAD W/O CONTRAST 7/23/2019 1:55 PM    Provided History: unexplained confusion    Comparison: CT head 9/9/2015.    Technique: Using multidetector thin collimation helical acquisition  technique, axial, coronal and sagittal CT images from the skull base  to the vertex were obtained without intravenous contrast.     Findings:    No intracranial hemorrhage, mass effect, or midline shift. The lateral  and third ventricles are disproportionately large compared to the  sulci, overall similar to 9/9/2015, with no evidence of obstruction.  Unchanged 3 mm hyperintense focus in the roof of the third ventricle.  Unchanged subcortical and periventricular white matter hypodensities,  consistent with chronic small vessel ischemic changes. The gray to  white matter differentiation of the cerebral hemispheres is preserved.  The basal cisterns are patent. Atherosclerotic vascular calcifications  noted.    The visualized paranasal sinuses are clear. The mastoid air cells are  clear.       Impression    Impression:   1. No acute intracranial pathology.   2. Symmetric unobstructed ventriculomegaly of the lateral and third  ventricles, disproportionate to the sulci, unchanged compared to  9/9/2015. This is suggestive of normal pressure hydrocephalus.  3. 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  4. Unchanged small vessel ischemic changes.    I have personally reviewed the examination and initial interpretation  and I agree with the findings.    DALILA BOLTON MD     Medications     sodium chloride 50 mL/hr at 07/23/19 6295       acetaminophen  650 mg Oral Q4H     allopurinol   300 mg Oral Daily     amLODIPine  10 mg Oral Daily     aspirin  81 mg Oral Daily     cefTRIAXone  2 g Intravenous Q24H     chlorthalidone  12.5 mg Oral Daily     doxycycline (VIBRAMYCIN) IV  100 mg Intravenous Q12H     gabapentin  600 mg Oral BID     glipiZIDE  10 mg Oral BID w/meals     insulin aspart  1-7 Units Subcutaneous TID AC     insulin aspart  1-5 Units Subcutaneous At Bedtime     insulin glargine  30 Units Subcutaneous At Bedtime     ipratropium - albuterol 0.5 mg/2.5 mg/3 mL  3 mL Nebulization Q4H     lidocaine  2 patch Transdermal Q24H     lidocaine   Transdermal Q8H     lidocaine   Transdermal Q24h     losartan  100 mg Oral Daily     metFORMIN  1,000 mg Oral BID w/meals     methocarbamol  500 mg Oral 4x Daily     omeprazole  40 mg Oral BID AC     predniSONE  40 mg Oral Daily     simvastatin  20 mg Oral At Bedtime     sodium chloride (PF)  3 mL Intracatheter Q8H     tamsulosin  0.4 mg Oral Daily     tolterodine ER  2 mg Oral Daily

## 2019-07-23 NOTE — PLAN OF CARE
Discharge Planner OT  OT HOLDING.     Patient plan for discharge: Confirming with  patient agreeable to TCU recommendation per PT.   Current status: OT evaluation orders received and appreciated. PT evaluating this AM and recommending TCU due to mobility impairments. Given OBS status and plan for TCU, OT to hold at this time. Anticipate patient may have skilled OT/cognitive needs that can be met at next level of care. OT to monitor and initiate should discharge plans change.   Barriers to return to prior living situation: Medical status, Balance, Lives Alone, Cognition deficits (Per chart review, cognition may be near baseline. Deflects questions at times).   Recommendations for discharge: Agree with PT recommendation of TCU.   Rationale for recommendations: See current status. PT evaluating while observation status.        Entered by: Martha Quintero 07/23/2019 3:07 PM

## 2019-07-24 ENCOUNTER — APPOINTMENT (OUTPATIENT)
Dept: CARDIOLOGY | Facility: CLINIC | Age: 79
End: 2019-07-24
Attending: NURSE PRACTITIONER
Payer: MEDICARE

## 2019-07-24 VITALS
WEIGHT: 205 LBS | TEMPERATURE: 98 F | HEART RATE: 94 BPM | RESPIRATION RATE: 16 BRPM | SYSTOLIC BLOOD PRESSURE: 133 MMHG | DIASTOLIC BLOOD PRESSURE: 69 MMHG | BODY MASS INDEX: 31.17 KG/M2 | OXYGEN SATURATION: 94 %

## 2019-07-24 LAB
ANION GAP SERPL CALCULATED.3IONS-SCNC: 13 MMOL/L (ref 3–14)
ANION GAP SERPL CALCULATED.3IONS-SCNC: 16 MMOL/L (ref 3–14)
ANION GAP SERPL CALCULATED.3IONS-SCNC: 9 MMOL/L (ref 3–14)
BASOPHILS # BLD AUTO: 0 10E9/L (ref 0–0.2)
BASOPHILS NFR BLD AUTO: 0.1 %
BUN SERPL-MCNC: 56 MG/DL (ref 7–30)
BUN SERPL-MCNC: 60 MG/DL (ref 7–30)
BUN SERPL-MCNC: 65 MG/DL (ref 7–30)
CALCIUM SERPL-MCNC: 7.9 MG/DL (ref 8.5–10.1)
CALCIUM SERPL-MCNC: 8.2 MG/DL (ref 8.5–10.1)
CALCIUM SERPL-MCNC: 8.4 MG/DL (ref 8.5–10.1)
CHLORIDE SERPL-SCNC: 103 MMOL/L (ref 94–109)
CHLORIDE SERPL-SCNC: 108 MMOL/L (ref 94–109)
CHLORIDE SERPL-SCNC: 111 MMOL/L (ref 94–109)
CO2 SERPL-SCNC: 15 MMOL/L (ref 20–32)
CO2 SERPL-SCNC: 17 MMOL/L (ref 20–32)
CO2 SERPL-SCNC: 17 MMOL/L (ref 20–32)
CREAT SERPL-MCNC: 1.73 MG/DL (ref 0.66–1.25)
CREAT SERPL-MCNC: 2.04 MG/DL (ref 0.66–1.25)
CREAT SERPL-MCNC: 2.38 MG/DL (ref 0.66–1.25)
DIFFERENTIAL METHOD BLD: ABNORMAL
EOSINOPHIL # BLD AUTO: 0 10E9/L (ref 0–0.7)
EOSINOPHIL NFR BLD AUTO: 0 %
ERYTHROCYTE [DISTWIDTH] IN BLOOD BY AUTOMATED COUNT: 12.9 % (ref 10–15)
ERYTHROCYTE [DISTWIDTH] IN BLOOD BY AUTOMATED COUNT: 13 % (ref 10–15)
GFR SERPL CREATININE-BSD FRML MDRD: 25 ML/MIN/{1.73_M2}
GFR SERPL CREATININE-BSD FRML MDRD: 30 ML/MIN/{1.73_M2}
GFR SERPL CREATININE-BSD FRML MDRD: 37 ML/MIN/{1.73_M2}
GLUCOSE BLDC GLUCOMTR-MCNC: 147 MG/DL (ref 70–99)
GLUCOSE BLDC GLUCOMTR-MCNC: 156 MG/DL (ref 70–99)
GLUCOSE BLDC GLUCOMTR-MCNC: 199 MG/DL (ref 70–99)
GLUCOSE BLDC GLUCOMTR-MCNC: 245 MG/DL (ref 70–99)
GLUCOSE SERPL-MCNC: 156 MG/DL (ref 70–99)
GLUCOSE SERPL-MCNC: 188 MG/DL (ref 70–99)
GLUCOSE SERPL-MCNC: 297 MG/DL (ref 70–99)
HCT VFR BLD AUTO: 35.4 % (ref 40–53)
HCT VFR BLD AUTO: 36.1 % (ref 40–53)
HGB BLD-MCNC: 11.5 G/DL (ref 13.3–17.7)
HGB BLD-MCNC: 11.8 G/DL (ref 13.3–17.7)
IMM GRANULOCYTES # BLD: 0.1 10E9/L (ref 0–0.4)
IMM GRANULOCYTES NFR BLD: 0.9 %
INTERPRETATION ECG - MUSE: NORMAL
INTERPRETATION ECG - MUSE: NORMAL
LACTATE BLD-SCNC: 2.1 MMOL/L (ref 0.7–2)
LACTATE BLD-SCNC: 2.6 MMOL/L (ref 0.7–2)
LACTATE BLD-SCNC: 3.1 MMOL/L (ref 0.7–2)
LYMPHOCYTES # BLD AUTO: 1.5 10E9/L (ref 0.8–5.3)
LYMPHOCYTES NFR BLD AUTO: 10 %
MAGNESIUM SERPL-MCNC: 1.6 MG/DL (ref 1.6–2.3)
MCH RBC QN AUTO: 30 PG (ref 26.5–33)
MCH RBC QN AUTO: 30.8 PG (ref 26.5–33)
MCHC RBC AUTO-ENTMCNC: 32.5 G/DL (ref 31.5–36.5)
MCHC RBC AUTO-ENTMCNC: 32.7 G/DL (ref 31.5–36.5)
MCV RBC AUTO: 92 FL (ref 78–100)
MCV RBC AUTO: 94 FL (ref 78–100)
MONOCYTES # BLD AUTO: 0.9 10E9/L (ref 0–1.3)
MONOCYTES NFR BLD AUTO: 6.2 %
NEUTROPHILS # BLD AUTO: 12.1 10E9/L (ref 1.6–8.3)
NEUTROPHILS NFR BLD AUTO: 82.8 %
NRBC # BLD AUTO: 0 10*3/UL
NRBC BLD AUTO-RTO: 0 /100
PHOSPHATE SERPL-MCNC: 4.2 MG/DL (ref 2.5–4.5)
PLATELET # BLD AUTO: 244 10E9/L (ref 150–450)
PLATELET # BLD AUTO: 251 10E9/L (ref 150–450)
POTASSIUM SERPL-SCNC: 3.6 MMOL/L (ref 3.4–5.3)
POTASSIUM SERPL-SCNC: 3.9 MMOL/L (ref 3.4–5.3)
POTASSIUM SERPL-SCNC: 4.1 MMOL/L (ref 3.4–5.3)
RBC # BLD AUTO: 3.83 10E12/L (ref 4.4–5.9)
RBC # BLD AUTO: 3.83 10E12/L (ref 4.4–5.9)
SODIUM SERPL-SCNC: 134 MMOL/L (ref 133–144)
SODIUM SERPL-SCNC: 137 MMOL/L (ref 133–144)
SODIUM SERPL-SCNC: 138 MMOL/L (ref 133–144)
TROPONIN I SERPL-MCNC: <0.015 UG/L (ref 0–0.04)
WBC # BLD AUTO: 14 10E9/L (ref 4–11)
WBC # BLD AUTO: 14.6 10E9/L (ref 4–11)

## 2019-07-24 PROCEDURE — 00000146 ZZHCL STATISTIC GLUCOSE BY METER IP

## 2019-07-24 PROCEDURE — 25000125 ZZHC RX 250: Performed by: NURSE PRACTITIONER

## 2019-07-24 PROCEDURE — G0378 HOSPITAL OBSERVATION PER HR: HCPCS

## 2019-07-24 PROCEDURE — 25000131 ZZH RX MED GY IP 250 OP 636 PS 637: Mod: GY | Performed by: NURSE PRACTITIONER

## 2019-07-24 PROCEDURE — 85025 COMPLETE CBC W/AUTO DIFF WBC: CPT | Performed by: NURSE PRACTITIONER

## 2019-07-24 PROCEDURE — 40000275 ZZH STATISTIC RCP TIME EA 10 MIN

## 2019-07-24 PROCEDURE — 25000132 ZZH RX MED GY IP 250 OP 250 PS 637: Mod: GY | Performed by: NURSE PRACTITIONER

## 2019-07-24 PROCEDURE — 96361 HYDRATE IV INFUSION ADD-ON: CPT

## 2019-07-24 PROCEDURE — 83605 ASSAY OF LACTIC ACID: CPT | Performed by: PHYSICIAN ASSISTANT

## 2019-07-24 PROCEDURE — 80048 BASIC METABOLIC PNL TOTAL CA: CPT | Performed by: PHYSICIAN ASSISTANT

## 2019-07-24 PROCEDURE — 99217 ZZC OBSERVATION CARE DISCHARGE: CPT | Mod: Z6 | Performed by: PHYSICIAN ASSISTANT

## 2019-07-24 PROCEDURE — 25500064 ZZH RX 255 OP 636: Performed by: INTERNAL MEDICINE

## 2019-07-24 PROCEDURE — 83605 ASSAY OF LACTIC ACID: CPT | Performed by: NURSE PRACTITIONER

## 2019-07-24 PROCEDURE — 25000125 ZZHC RX 250: Performed by: PHYSICIAN ASSISTANT

## 2019-07-24 PROCEDURE — 36415 COLL VENOUS BLD VENIPUNCTURE: CPT | Performed by: NURSE PRACTITIONER

## 2019-07-24 PROCEDURE — 85027 COMPLETE CBC AUTOMATED: CPT | Performed by: NURSE PRACTITIONER

## 2019-07-24 PROCEDURE — 94640 AIRWAY INHALATION TREATMENT: CPT

## 2019-07-24 PROCEDURE — 25000128 H RX IP 250 OP 636: Performed by: NURSE PRACTITIONER

## 2019-07-24 PROCEDURE — 93306 TTE W/DOPPLER COMPLETE: CPT | Mod: 26 | Performed by: INTERNAL MEDICINE

## 2019-07-24 PROCEDURE — 40000264 ECHOCARDIOGRAM COMPLETE

## 2019-07-24 PROCEDURE — 96372 THER/PROPH/DIAG INJ SC/IM: CPT

## 2019-07-24 PROCEDURE — 80048 BASIC METABOLIC PNL TOTAL CA: CPT | Performed by: NURSE PRACTITIONER

## 2019-07-24 PROCEDURE — 96376 TX/PRO/DX INJ SAME DRUG ADON: CPT | Mod: 59

## 2019-07-24 PROCEDURE — 25000128 H RX IP 250 OP 636: Performed by: PHYSICIAN ASSISTANT

## 2019-07-24 PROCEDURE — 36415 COLL VENOUS BLD VENIPUNCTURE: CPT | Performed by: PHYSICIAN ASSISTANT

## 2019-07-24 RX ORDER — LIDOCAINE 4 G/G
2 PATCH TOPICAL EVERY 24 HOURS
Qty: 10 PATCH | Refills: 0 | Status: SHIPPED | OUTPATIENT
Start: 2019-07-24

## 2019-07-24 RX ORDER — POLYETHYLENE GLYCOL 3350 17 G/17G
17 POWDER, FOR SOLUTION ORAL DAILY
Status: DISCONTINUED | OUTPATIENT
Start: 2019-07-24 | End: 2019-07-24 | Stop reason: HOSPADM

## 2019-07-24 RX ORDER — BUDESONIDE 0.5 MG/2ML
0.5 INHALANT ORAL 2 TIMES DAILY
Status: DISCONTINUED | OUTPATIENT
Start: 2019-07-24 | End: 2019-07-24 | Stop reason: HOSPADM

## 2019-07-24 RX ORDER — POLYETHYLENE GLYCOL 3350 17 G/17G
17 POWDER, FOR SOLUTION ORAL DAILY
Qty: 72 PACKET | Refills: 0 | Status: SHIPPED | OUTPATIENT
Start: 2019-07-25

## 2019-07-24 RX ORDER — OXYCODONE HYDROCHLORIDE 5 MG/1
5 TABLET ORAL EVERY 4 HOURS PRN
Qty: 20 TABLET | Refills: 0 | Status: SHIPPED | OUTPATIENT
Start: 2019-07-24

## 2019-07-24 RX ORDER — DOXYCYCLINE 100 MG/1
100 CAPSULE ORAL 2 TIMES DAILY
Qty: 14 CAPSULE | Refills: 0 | Status: SHIPPED | OUTPATIENT
Start: 2019-07-24 | End: 2019-07-31

## 2019-07-24 RX ADMIN — TOLTERODINE 2 MG: 2 CAPSULE, EXTENDED RELEASE ORAL at 09:28

## 2019-07-24 RX ADMIN — ASPIRIN 81 MG: 81 TABLET, COATED ORAL at 09:29

## 2019-07-24 RX ADMIN — OXYCODONE HYDROCHLORIDE 5 MG: 5 TABLET ORAL at 06:53

## 2019-07-24 RX ADMIN — DOXYCYCLINE 100 MG: 100 INJECTION, POWDER, LYOPHILIZED, FOR SOLUTION INTRAVENOUS at 09:26

## 2019-07-24 RX ADMIN — SODIUM CHLORIDE 1000 ML: 9 INJECTION, SOLUTION INTRAVENOUS at 09:44

## 2019-07-24 RX ADMIN — TAMSULOSIN HYDROCHLORIDE 0.4 MG: 0.4 CAPSULE ORAL at 09:30

## 2019-07-24 RX ADMIN — INSULIN ASPART 2 UNITS: 100 INJECTION, SOLUTION INTRAVENOUS; SUBCUTANEOUS at 01:27

## 2019-07-24 RX ADMIN — GABAPENTIN 600 MG: 300 CAPSULE ORAL at 09:28

## 2019-07-24 RX ADMIN — POLYETHYLENE GLYCOL 3350 17 G: 17 POWDER, FOR SOLUTION ORAL at 09:30

## 2019-07-24 RX ADMIN — AMLODIPINE BESYLATE 10 MG: 10 TABLET ORAL at 09:30

## 2019-07-24 RX ADMIN — GLIPIZIDE 10 MG: 10 TABLET ORAL at 09:29

## 2019-07-24 RX ADMIN — METHOCARBAMOL 500 MG: 500 TABLET, FILM COATED ORAL at 09:29

## 2019-07-24 RX ADMIN — CHLORTHALIDONE 12.5 MG: 25 TABLET ORAL at 09:30

## 2019-07-24 RX ADMIN — INSULIN ASPART 1 UNITS: 100 INJECTION, SOLUTION INTRAVENOUS; SUBCUTANEOUS at 12:47

## 2019-07-24 RX ADMIN — LOSARTAN POTASSIUM 100 MG: 100 TABLET, FILM COATED ORAL at 09:29

## 2019-07-24 RX ADMIN — ACETAMINOPHEN 650 MG: 325 TABLET, FILM COATED ORAL at 00:33

## 2019-07-24 RX ADMIN — BUDESONIDE 0.5 MG: 0.5 INHALANT RESPIRATORY (INHALATION) at 12:01

## 2019-07-24 RX ADMIN — ALLOPURINOL 300 MG: 300 TABLET ORAL at 09:28

## 2019-07-24 RX ADMIN — HUMAN ALBUMIN MICROSPHERES AND PERFLUTREN 5 ML: 10; .22 INJECTION, SOLUTION INTRAVENOUS at 09:15

## 2019-07-24 RX ADMIN — ACETAMINOPHEN 650 MG: 325 TABLET, FILM COATED ORAL at 09:29

## 2019-07-24 RX ADMIN — SODIUM CHLORIDE 1000 ML: 9 INJECTION, SOLUTION INTRAVENOUS at 01:25

## 2019-07-24 RX ADMIN — ACETAMINOPHEN 650 MG: 325 TABLET, FILM COATED ORAL at 04:23

## 2019-07-24 RX ADMIN — OMEPRAZOLE 40 MG: 20 CAPSULE, DELAYED RELEASE ORAL at 09:29

## 2019-07-24 RX ADMIN — ACETAMINOPHEN 650 MG: 325 TABLET, FILM COATED ORAL at 12:47

## 2019-07-24 RX ADMIN — LIDOCAINE 2 PATCH: 560 PATCH PERCUTANEOUS; TOPICAL; TRANSDERMAL at 09:37

## 2019-07-24 RX ADMIN — CEFTRIAXONE SODIUM 2 G: 2 INJECTION, POWDER, FOR SOLUTION INTRAMUSCULAR; INTRAVENOUS at 12:47

## 2019-07-24 RX ADMIN — INSULIN ASPART 1 UNITS: 100 INJECTION, SOLUTION INTRAVENOUS; SUBCUTANEOUS at 09:27

## 2019-07-24 RX ADMIN — METHOCARBAMOL 500 MG: 500 TABLET, FILM COATED ORAL at 12:46

## 2019-07-24 NOTE — PLAN OF CARE
Outpatient/Observation goals to be met before discharge home:     - Dyspnea improved and oxygen saturations greater than 88% on room air or prior home oxygen levels -yes  - Tolerates oral antibiotics or has plans for home infusion set up. -receiving IV antibiotics at this time   - Vital signs normal or at patient baseline -yes  - Infection is improving -pending   - Return to baseline functional status -no  - Safe disposition plan has been identified -yes, TCU

## 2019-07-24 NOTE — PROGRESS NOTES
Social Work Services Progress Note    Hospital Day: 3  Date of Initial Social Work Evaluation:  7/23/2019  Collaborated with:  6C SW, Chart review, Pt friend-Naseem, pt friend-Maria G    Data:  Pt is a 78-year old  male. [facility] Pt [facility] for neck pain. SW requested for TCU placement.    Intervention:  SW met with pt to discuss TCU options. TCU referrals were submitted by SW on site yesterday. Pt is pp for a TCU. Of the three referrals, two did not have bed availability-[facility] & [facility]. [facility] had an open bed and did not require a deposit. Pt and friend, Naseem, stated that their office was near the building and they had 'not heard good things.' Pt and Naseem both stated that pt would not go there. SW spoke with pt consideration for facility, as pt was medically cleared to discharge today. Pt stated that he would not go there.    Pt & Naseem requested that SW make a referral to [facility]. Referral was made, therapy notes were sent, and two voicemails were left.     Pt later had a visitor, Maria G, visit. Pt stated that she was 'one of his girlfriends.' Maria G and pt requested that a referral also be sent to [facility] in [place]. Referral was sent, therapy notes were faxed and a voicemail was left.    Pt was accepted to [facility]. Pt stated that he would go there if [facility] in [place] wasn't available. JUAN A spoke to Bala at [facility] who verified that pt was accepted and could be admitted to [facility] today. Pt and Maria G are aware of the $3,000 down payment and $40 private cost/per day for [facility] in [place]. JUAN A alerted Ryan at [facility] that pt would not be admitting to [facility]. JUAN A also alerted admissions at [facility] that pt was going elsewhere.    PAS was completed: [id number]    Discharge orders were faxed to Bala at  [facility]. Bala confirmed that she received the discharge orders.    Maria G [phone] will be transporting pt to [facility] in [place].     Assessment:  Pt reiterates power he has because of his finances. Pt says that he is 'in charge' of RN's and SW. Pt jokes with SW. Pt is humerous with conversating.     Plan:    Anticipated Disposition:  Facility:  [facility]    Barriers to d/c plan:  Unforseen medical changes    Follow Up:  Pt to discharge to skilled setting for therapy services.    Judith Romo, [facility]   Unit 6D  Pager: [phone]

## 2019-07-24 NOTE — PLAN OF CARE
/69 (BP Location: Left arm)   Pulse 94   Temp 98  F (36.7  C) (Oral)   Resp 16   Wt 93 kg (205 lb)   SpO2 94%   BMI 31.17 kg/m      VSS. A &O x3, forgetful. BP soft this am 93/65, Pt asymptomatic. Got 1L NS bolus over 5hrs. Voiding adequate amounts. Tolerating diet. Plan to discharge to  TCU [facility] in [place] at 3:30pm today. Family/friend to provide transportation. Up with SBA.

## 2019-07-24 NOTE — DISCHARGE SUMMARY
Discharge Summary    Rolando Gonzalez MRN# [id number]   YOB: 1940 Age: 78 year old     Date of Admission:  7/22/2019  Date of Discharge:  7/24/2019  3:48 PM  Admitting Physician:  Minerva Strauss MD  Discharge Physician:  MADDIE LYN  Discharging Service:  Emergency Department Observation Unit     Primary Provider: Denise Russ          Discharge Diagnosis:     Neck pain    Pneumonia    * No resolved hospital problems. *               Discharge Disposition:   Discharged to home           Condition on Discharge:   Discharge condition: Stable   Code status on discharge: DNR / DNI           Procedures:   Imaging performed:   Chest x-ray             Discharge Medications:     Current Discharge Medication List      START taking these medications    Details   Lidocaine (LIDOCARE) 4 % Patch Place 2 patches onto the skin every 24 hours To prevent lidocaine toxicity, patient should be patch free for 12 hrs daily.  Qty: 10 patch, Refills: 0    Associated Diagnoses: Neck pain      oxyCODONE (ROXICODONE) 5 MG tablet Take 1 tablet (5 mg) by mouth every 4 hours as needed for moderate to severe pain  Qty: 20 tablet, Refills: 0    Associated Diagnoses: Neck pain      polyethylene glycol (MIRALAX/GLYCOLAX) packet Take 17 g by mouth daily  Qty: 72 packet, Refills: 0    Associated Diagnoses: Drug-induced constipation         CONTINUE these medications which have NOT CHANGED    Details   albuterol (PROAIR HFA, PROVENTIL HFA, VENTOLIN HFA) 108 (90 BASE) MCG/ACT inhaler Inhale 2 puffs into the lungs every 6 hours      allopurinol (ZYLOPRIM) 300 MG tablet Take 1 tablet (300 mg) by mouth daily  Qty: 90 tablet, Refills: 3    Associated Diagnoses: Benign essential hypertension      amLODIPine (NORVASC) 10 MG tablet Take 1 tablet (10 mg) by mouth daily  Qty: 90 tablet, Refills: 0    Associated Diagnoses: Essential hypertension with goal blood pressure less than 130/80      aspirin 81 MG tablet Take by mouth  daily    Associated Diagnoses: Hearing loss, unspecified laterality; Fatigue; Diabetes mellitus type 2, insulin dependent (H); Hyperlipidemia; COPD (chronic obstructive pulmonary disease) (H)      blood glucose (ACCU-CHEK MERY) test strip Test 2 times daily  Qty: 200 each, Refills: 3    Associated Diagnoses: Diabetes mellitus (H)      blood glucose monitoring (ACCU-CHEK MERY PLUS) meter device kit Use to test blood sugar 2 times daily or as directed.  Qty: 1 kit, Refills: 0    Associated Diagnoses: Diabetes mellitus (H)      budesonide-formoterol (SYMBICORT) 80-4.5 MCG/ACT Inhaler Inhale 2 puffs into the lungs 2 times daily  Qty: 1 Inhaler, Refills: 1    Associated Diagnoses: Moderate persistent asthma without complication      camphor-menthol (DERMASARRA) 0.5-0.5 % LOTN Applied to skin 3 times day p.r.n. for itching  Qty: 1 Bottle, Refills: 3    Associated Diagnoses: Itching      celecoxib (CELEBREX) 200 MG capsule Take 1 capsule (200 mg) by mouth daily  Qty: 90 capsule, Refills: 3    Associated Diagnoses: Diabetes mellitus type 2, uncontrolled (H)      chlorthalidone (HYGROTON) 25 MG tablet Take one half tablet every morning.Discontinue the hydrochlorothiazide 12.5 mg  Qty: 45 tablet, Refills: 3    Associated Diagnoses: Edema, unspecified type      Cholecalciferol (VITAMIN D-3) 1000 UNITS CAPS Take 1,000 Units by mouth daily      cyclobenzaprine (FLEXERIL) 5 MG tablet Take one or two daily as needed for severe neck neck pain.  Qty: 42 tablet, Refills: 0    Associated Diagnoses: Neck pain      Dentifrices (BIOTENE DRY MOUTH) PSTE Apply 1 spray to affected area      fluticasone (FLONASE) 50 MCG/ACT nasal spray 2 sprays by Both Nostrils route daily.    Associated Diagnoses: Diabetes mellitus type 2, uncontrolled (H); Hyperlipidemia      fluticasone (FLOVENT HFA) 110 MCG/ACT inhaler Inhale 2 puffs into the lungs 2 times daily  Qty: 1 Inhaler, Refills: 12    Associated Diagnoses: Asthma      gabapentin (NEURONTIN)  300 MG capsule Take 2 capsules (600 mg) by mouth 2 times daily  Qty: 360 capsule, Refills: 2    Associated Diagnoses: Diabetic nephropathy (H)      glipiZIDE (GLUCOTROL) 10 MG tablet Take 1 tablet (10 mg) by mouth 2 times daily  Qty: 180 tablet, Refills: 0    Comments: Please call clinic to schedule appointment. FYI - labs needed.  Associated Diagnoses: Diabetes mellitus type 2, uncontrolled (H)      indomethacin (INDOCIN) 50 MG capsule Take 1 capsule (50 mg) by mouth 3 times daily as needed Use only during a gout attack  Qty: 30 capsule, Refills: 0    Associated Diagnoses: Gout, unspecified      insulin glargine (BASAGLAR KWIKPEN) 100 UNIT/ML pen Inject 30 Units Subcutaneous 2 times daily  Qty: 18 mL, Refills: 11    Associated Diagnoses: Diabetes mellitus type 2, insulin dependent (H)      insulin pen needle (B-D U/F) 31G X 8 MM Use twice daily or as directed. *3 month supply  Qty: 200 each, Refills: 3    Associated Diagnoses: Diabetes mellitus type 2, uncontrolled (H)      losartan (COZAAR) 100 MG tablet Take 1 tablet (100 mg) by mouth daily  Qty: 90 tablet, Refills: 1    Associated Diagnoses: Essential hypertension with goal blood pressure less than 130/80      metFORMIN (GLUCOPHAGE-XR) 500 MG 24 hr tablet Take 2 tablets in the morning and 2 tablets in the evening.  Qty: 120 tablet, Refills: 3    Associated Diagnoses: Diabetes mellitus type 2, uncontrolled (H)      omeprazole (PRILOSEC) 40 MG capsule Take 1 capsule (40 mg) by mouth 2 times daily  Qty: 180 capsule, Refills: 3    Associated Diagnoses: Esophageal reflux      saw palmetto 160 MG CAPS Take 160 mg by mouth daily      simethicone (MYLICON) 125 MG CHEW chewable tablet Take 1 tablet (125 mg) by mouth 4 times daily as needed for intestinal gas  Qty: 30 tablet, Refills: 0      simvastatin (ZOCOR) 20 MG tablet Take 1 tablet (20 mg) by mouth At Bedtime  Qty: 90 tablet, Refills: 1    Associated Diagnoses: Hyperlipemia      solifenacin (VESICARE) 5 MG tablet  Take 1 tablet (5 mg) by mouth daily  Qty: 90 tablet, Refills: 2    Associated Diagnoses: Urgency incontinence      tamsulosin (FLOMAX) 0.4 MG capsule Take 1 capsule (0.4 mg) by mouth daily  Qty: 90 capsule, Refills: 0    Associated Diagnoses: Benign prostatic hyperplasia with nocturia      tiotropium (SPIRIVA HANDIHALER) 18 MCG inhalation capsule Inhale contents of one capsule daily.  Qty: 30 capsule, Refills: 1    Associated Diagnoses: Intermittent asthma         STOP taking these medications       AMOXICILLIN PO Comments:   Reason for Stopping:         azithromycin (ZITHROMAX) 250 MG tablet Comments:   Reason for Stopping:         HYDROcodone-acetaminophen (NORCO) 5-325 MG per tablet Comments:   Reason for Stopping:                     Consultations:   Consultation during this admission received from PT JUAN A             Brief History of Illness:   Rolando Gonzalez is a 78 year old male w/PMH significant for osteoarthritis, RLL lung nodule, moderate persistent asthma, hypertension, hyperlipoidemia, GERD, gout, ED, diabetic neuropathy, DM type 2 who presented to the ED with neck pain.           Hospital Course:   1. CAP/bronchitis:  HR was 102-115, /63, he was afebrile. Labs: BMP showed creatinine <0.015  .  Procal 0.08.  Lactic acid 1.1.  Glucose 164.  Venous blood gas was unremarkable.  CBC with WBC 13.8. Chest x-ray showed small amount of left basilar atelectasis, otherwise no acute cardiopulmonary findings. EKG showed sinus tachycardia with RBBB (unchanged from prior).  He was given 2g Rocephin IV, flexeril, doxycyline PO, dilaudid, duoneb, prednisone 40 mg PO, and lidoderm patch in the ED.  He was admitted to the observation unit for possible pneumonia treatment. UA negative. BC NTD  -continue doxycyline 100 mg po BID x 7 days         2. Neck pain:Patient reports posterior neck pain that is worse with movement. He denies numbness or tingling in his extremities or weakness.  CT cervical spine: No acute  fracture or traumatic subluxation. Cervical spondylosis as above with multilevel neural foraminal stenosis. There is thickening and calcification within the soft tissue  posterior to the odontoid process causing mild spinal canal stenosis at this level which has progressed from 5/22/2008. This can be seen with rheumatoid arthritis. PT consulted and recommended TCU   -pain control with: robaxin 4 times daily, tylenol scheduled, lidoderm patches,oxycodone prn for breakthrough pain-aqua K pad to neck.  -fall precautions         3. Confusion: Patient's family and friends in the room and reported patient appeared more confused than usual. Patient had head CT on 09/09/2015 because of his memory loss.  Findings of symmetric ventriculomegaly involving the lateral and third ventricles out of proportion to the cortical sulci.  These findings can be seen in normal pressure hydrocephalus.  There are findings of 3 mm colloid cyst at the roof of the third ventricle and findings of moderate small vessel ischemic changes. He was seen in the neurology clinic in 2015 and the physician recommended MOCA testing.  MOCA attempted, patient declined MOCA.  Neurologist also recommended neurocognitive study which does not appear it was completed. CT head today showed No acute intracranial pathology. Symmetric unobstructed ventriculomegaly of the lateral and third ventricles, disproportionate to the sulci, unchanged compared to 9/9/2015. This is suggestive of normal pressure hydrocephalus. 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  - Outpatient neurocognitive study.      4. Chest pain, tachycardia with frequent PVCs: Reports chest pain since this afternoon.  Describes it as a pinch in his left side of chest.  Worse with movement.  Likely musculoskeletal. EKG shows sinus tachycardia with frequent PVCs and RBBB (unchanged from 7/22/19), 2nd trop negative.  Tachycardia could be secondary to steroids and nebulizers.  He has  been tachycardic throughout his stay. Creatinine bumped slightly today, patient could be dry. He was given 2 L of NS, creatinine improving to baseline of 1.7. Patient also underwent resting echocardiogram and this was normal.   -Recheck Cr in 1 week     5. GÉNESIS, CKD: creatinine 1.60 on admit, but increased to 1.98 this am.  Had been on gentle IVF throughout the night.  Will give 1,000 ml NS bolus.  Patient states he has been eating and drinking well. Creatinine improved to 1.7 at discharge.         Chronic medical problems:  #moderate persistent asthma: duonebs Q 4 hours while awake, albuterol Q 2 hours prn, prednisone 40 mg po daily, antibiotics as above, hold home inhalers given obs stay  #gout: continue PTA allopurinol  #DM type 2: blood sugars before meals and bedtime, continue PTA glipizide and metformin, Lantus 30 units BID (patient initially said he does this at bedtime, but now states it is BID and that is what is prescribed)-this would explain why he has been hyperglycemic today as he only received it once in the past 24 hours.  #hypertension: continue PTA losartan, amlodipine  #GERD: continue PTA PPI  #diabetic neuropathy: continue PTA gabapentin  #CKD: creatinine at baseline 1.60, patient appears slightly dry, will give gentle IVF               Final Day of Progress before Discharge:       Physical Exam:  Blood pressure 133/69, pulse 94, temperature 98  F (36.7  C), temperature source Oral, resp. rate 16, weight 93 kg (205 lb), SpO2 94 %.    EXAM:  Physical Exam   Constitutional: Pt is oriented to person, place, and time.Pt appears well-developed and well-nourished.   HENT:   Head: Normocephalic and atraumatic.   Eyes: Conjunctivae are normal. Pupils are equal, round, and reactive to light.   Neck: Normal range of motion. Neck supple.   Cardiovascular: Normal rate, regular rhythm, normal heart sounds and intact distal pulses.    Pulmonary/Chest: Effort normal and breath sounds normal. No respiratory  distress. Pt has no wheezes. Pt has no rales  Abdominal: Soft. Bowel sounds are normal. Pt exhibits no distension and no mass. No tenderness. Pt has no rebound and no guarding.   Musculoskeletal: Normal range of motion. Pt exhibits no edema.   Neurological: Pt is alert and oriented to person, place, and time. Normal reflexes.   Skin: Skin is warm and dry. No rash noted.   Psychiatric: Pt has a normal mood and affect. Behavior is normal. Judgment and thought content normal.             Data:  All laboratory data reviewed             Significant Results:   None  Results for orders placed or performed during the hospital encounter of 07/22/19   XR Chest 2 Views    Narrative    XR CHEST 2 VW7/22/2019 11:10 AM    INDICATION: sob    COMPARISON:  CT chest 9/9/2015    FINDINGS: PA and lateral views of the chest. Cardiac silhouette is  stable from prior CT. Calcifications of the pleura on the left dome of  the diaphragm similar to prior exam. Slightly obscured left  costodiaphragmatic angle. No pneumothorax. Upper abdomen is  unremarkable. No acute osseous lesions.      Impression    IMPRESSION: Small amount of left basilar atelectasis, otherwise no  acute cardiopulmonary findings.    I have personally reviewed the examination and initial interpretation  and I agree with the findings.    ALYSHA CANTRELL MD   Head CT w/o contrast    Narrative    CT HEAD W/O CONTRAST 7/23/2019 1:55 PM    Provided History: unexplained confusion    Comparison: CT head 9/9/2015.    Technique: Using multidetector thin collimation helical acquisition  technique, axial, coronal and sagittal CT images from the skull base  to the vertex were obtained without intravenous contrast.     Findings:    No intracranial hemorrhage, mass effect, or midline shift. The lateral  and third ventricles are disproportionately large compared to the  sulci, overall similar to 9/9/2015, with no evidence of obstruction.  Unchanged 3 mm hyperintense focus in the roof of  the third ventricle.  Unchanged subcortical and periventricular white matter hypodensities,  consistent with chronic small vessel ischemic changes. The gray to  white matter differentiation of the cerebral hemispheres is preserved.  The basal cisterns are patent. Atherosclerotic vascular calcifications  noted.    The visualized paranasal sinuses are clear. The mastoid air cells are  clear.       Impression    Impression:   1. No acute intracranial pathology.   2. Symmetric unobstructed ventriculomegaly of the lateral and third  ventricles, disproportionate to the sulci, unchanged compared to  9/9/2015. This is suggestive of normal pressure hydrocephalus.  3. 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  4. Unchanged small vessel ischemic changes.    I have personally reviewed the examination and initial interpretation  and I agree with the findings.    DALILA BOLTON MD   Cervical spine CT w/o contrast    Narrative    CT CERVICAL SPINE W/O CONTRAST 7/23/2019 1:54 PM    Provided History: Neck pain, initial exam; neck pain r/o DJD    Comparison: MR cervical spine 5/22/2018, CT head 9/9/2015    Technique: Using multidetector thin collimation helical acquisition  technique, axial, coronal and sagittal CT images through the cervical  spine were obtained without intravenous contrast.     Findings:  Multilevel cervical spondylosis with straightening of the normal  lordotic curvature. Minimal grade 1 degenerative retrolisthesis of C2  on C3. No acute fracture or subluxation. No prevertebral edema. There  is disc space narrowing most pronounced at C3-4 and C6-7. The findings  on a level by level basis are as follows:    C1-2: Thickening and calcification within the soft tissue posterior to  the odontoid process. This is causing mild spinal canal stenosis at  this level and has progressed from 5/22/2008 MRI.     C2-3: Left posterior osteophytic spurring and facet hypertrophy left  greater than right causing  moderate left neural foraminal stenosis. No  spinal canal stenosis.    C3-4:  Mild posterior osteophytic spurring with mild bilateral neural  foraminal stenosis. There is also anterior osteophytic spurring. No  spinal canal stenosis.    C4-5:  Mild posterior osteophytic spurring with facet hypertrophy  right greater than left. Moderate right neural foraminal stenosis. No  spinal canal stenosis.    C5-6:  Left facet hypertrophy with mild bilateral neural foraminal  stenosis. No spinal canal stenosis.    C6-7:  Mild facet hypertrophy with mild neuroforaminal stenosis  bilaterally. No spinal canal stenosis.    C7-T1:  Mild right facet hypertrophy causing mild right neural  foraminal stenosis. No left neural foraminal stenosis. No spinal canal  stenosis.     No additional abnormality of the paraspinous soft tissues.      Impression    Impression:   1. No acute fracture or traumatic subluxation.  2. Cervical spondylosis as above with multilevel neural foraminal  stenosis. There is thickening and calcification within the soft tissue  posterior to the odontoid process causing mild spinal canal stenosis  at this level which has progressed from 5/22/2008. This can be seen  with rheumatoid arthritis.    I have personally reviewed the examination and initial interpretation  and I agree with the findings.    DALILA BOLTON MD   CBC with platelets differential   Result Value Ref Range    WBC 18.7 (H) 4.0 - 11.0 10e9/L    RBC Count 4.87 4.4 - 5.9 10e12/L    Hemoglobin 14.7 13.3 - 17.7 g/dL    Hematocrit 44.8 40.0 - 53.0 %    MCV 92 78 - 100 fl    MCH 30.2 26.5 - 33.0 pg    MCHC 32.8 31.5 - 36.5 g/dL    RDW 13.1 10.0 - 15.0 %    Platelet Count 280 150 - 450 10e9/L    Diff Method Automated Method     % Neutrophils 75.5 %    % Lymphocytes 14.7 %    % Monocytes 8.6 %    % Eosinophils 0.2 %    % Basophils 0.5 %    % Immature Granulocytes 0.5 %    Nucleated RBCs 0 0 /100    Absolute Neutrophil 14.1 (H) 1.6 - 8.3 10e9/L    Absolute  Lymphocytes 2.7 0.8 - 5.3 10e9/L    Absolute Monocytes 1.6 (H) 0.0 - 1.3 10e9/L    Absolute Eosinophils 0.0 0.0 - 0.7 10e9/L    Absolute Basophils 0.1 0.0 - 0.2 10e9/L    Abs Immature Granulocytes 0.1 0 - 0.4 10e9/L    Absolute Nucleated RBC 0.0    Nt probnp inpatient (BNP)   Result Value Ref Range    N-Terminal Pro BNP Inpatient 406 0 - 1,800 pg/mL   Basic metabolic panel   Result Value Ref Range    Sodium 138 133 - 144 mmol/L    Potassium 4.0 3.4 - 5.3 mmol/L    Chloride 104 94 - 109 mmol/L    Carbon Dioxide 23 20 - 32 mmol/L    Anion Gap 10 3 - 14 mmol/L    Glucose 164 (H) 70 - 99 mg/dL    Urea Nitrogen 34 (H) 7 - 30 mg/dL    Creatinine 1.60 (H) 0.66 - 1.25 mg/dL    GFR Estimate 40 (L) >60 mL/min/[1.73_m2]    GFR Estimate If Black 47 (L) >60 mL/min/[1.73_m2]    Calcium 9.3 8.5 - 10.1 mg/dL   Procalcitonin   Result Value Ref Range    Procalcitonin 0.08 ng/ml   UA with Microscopic   Result Value Ref Range    Color Urine Yellow     Appearance Urine Clear     Glucose Urine Negative NEG^Negative mg/dL    Bilirubin Urine Negative NEG^Negative    Ketones Urine 5 (A) NEG^Negative mg/dL    Specific Gravity Urine 1.020 1.003 - 1.035    Blood Urine Negative NEG^Negative    pH Urine 5.5 5.0 - 7.0 pH    Protein Albumin Urine 30 (A) NEG^Negative mg/dL    Urobilinogen mg/dL Normal 0.0 - 2.0 mg/dL    Nitrite Urine Negative NEG^Negative    Leukocyte Esterase Urine Negative NEG^Negative    Source Clean catch urine     WBC Urine 1 0 - 5 /HPF    RBC Urine 1 0 - 2 /HPF    Mucous Urine Present (A) NEG^Negative /LPF   Glucose by meter   Result Value Ref Range    Glucose 135 (H) 70 - 99 mg/dL   Hemoglobin A1c   Result Value Ref Range    Hemoglobin A1C 6.8 (H) 0 - 5.6 %   Glucose by meter   Result Value Ref Range    Glucose 245 (H) 70 - 99 mg/dL   Glucose by meter   Result Value Ref Range    Glucose 254 (H) 70 - 99 mg/dL   Lactic acid whole blood   Result Value Ref Range    Lactic Acid 1.4 0.7 - 2.0 mmol/L   Basic metabolic panel    Result Value Ref Range    Sodium 136 133 - 144 mmol/L    Potassium 4.0 3.4 - 5.3 mmol/L    Chloride 106 94 - 109 mmol/L    Carbon Dioxide 20 20 - 32 mmol/L    Anion Gap 10 3 - 14 mmol/L    Glucose 199 (H) 70 - 99 mg/dL    Urea Nitrogen 50 (H) 7 - 30 mg/dL    Creatinine 1.98 (H) 0.66 - 1.25 mg/dL    GFR Estimate 31 (L) >60 mL/min/[1.73_m2]    GFR Estimate If Black 36 (L) >60 mL/min/[1.73_m2]    Calcium 8.7 8.5 - 10.1 mg/dL   CBC with platelets differential   Result Value Ref Range    WBC 13.8 (H) 4.0 - 11.0 10e9/L    RBC Count 4.21 (L) 4.4 - 5.9 10e12/L    Hemoglobin 12.9 (L) 13.3 - 17.7 g/dL    Hematocrit 38.7 (L) 40.0 - 53.0 %    MCV 92 78 - 100 fl    MCH 30.6 26.5 - 33.0 pg    MCHC 33.3 31.5 - 36.5 g/dL    RDW 13.1 10.0 - 15.0 %    Platelet Count 249 150 - 450 10e9/L    Diff Method Automated Method     % Neutrophils 78.5 %    % Lymphocytes 13.9 %    % Monocytes 6.9 %    % Eosinophils 0.0 %    % Basophils 0.1 %    % Immature Granulocytes 0.6 %    Nucleated RBCs 0 0 /100    Absolute Neutrophil 10.8 (H) 1.6 - 8.3 10e9/L    Absolute Lymphocytes 1.9 0.8 - 5.3 10e9/L    Absolute Monocytes 1.0 0.0 - 1.3 10e9/L    Absolute Eosinophils 0.0 0.0 - 0.7 10e9/L    Absolute Basophils 0.0 0.0 - 0.2 10e9/L    Abs Immature Granulocytes 0.1 0 - 0.4 10e9/L    Absolute Nucleated RBC 0.0    Glucose by meter   Result Value Ref Range    Glucose 234 (H) 70 - 99 mg/dL   Glucose by meter   Result Value Ref Range    Glucose 166 (H) 70 - 99 mg/dL   Glucose by meter   Result Value Ref Range    Glucose 238 (H) 70 - 99 mg/dL   Troponin I   Result Value Ref Range    Troponin I ES <0.015 0.000 - 0.045 ug/L   Troponin I   Result Value Ref Range    Troponin I ES <0.015 0.000 - 0.045 ug/L   Glucose by meter   Result Value Ref Range    Glucose 334 (H) 70 - 99 mg/dL   Magnesium   Result Value Ref Range    Magnesium 1.6 1.6 - 2.3 mg/dL   Phosphorus   Result Value Ref Range    Phosphorus 4.2 2.5 - 4.5 mg/dL   Basic metabolic panel   Result Value Ref  Range    Sodium 134 133 - 144 mmol/L    Potassium 4.1 3.4 - 5.3 mmol/L    Chloride 103 94 - 109 mmol/L    Carbon Dioxide 15 (L) 20 - 32 mmol/L    Anion Gap 16 (H) 3 - 14 mmol/L    Glucose 297 (H) 70 - 99 mg/dL    Urea Nitrogen 65 (H) 7 - 30 mg/dL    Creatinine 2.38 (H) 0.66 - 1.25 mg/dL    GFR Estimate 25 (L) >60 mL/min/[1.73_m2]    GFR Estimate If Black 29 (L) >60 mL/min/[1.73_m2]    Calcium 8.4 (L) 8.5 - 10.1 mg/dL   Glucose by meter   Result Value Ref Range    Glucose 287 (H) 70 - 99 mg/dL   Troponin I   Result Value Ref Range    Troponin I ES <0.015 0.000 - 0.045 ug/L   Lactic acid whole blood   Result Value Ref Range    Lactic Acid 3.5 (H) 0.7 - 2.0 mmol/L   CBC with platelets differential   Result Value Ref Range    WBC 14.6 (H) 4.0 - 11.0 10e9/L    RBC Count 3.83 (L) 4.4 - 5.9 10e12/L    Hemoglobin 11.8 (L) 13.3 - 17.7 g/dL    Hematocrit 36.1 (L) 40.0 - 53.0 %    MCV 94 78 - 100 fl    MCH 30.8 26.5 - 33.0 pg    MCHC 32.7 31.5 - 36.5 g/dL    RDW 13.0 10.0 - 15.0 %    Platelet Count 251 150 - 450 10e9/L    Diff Method Automated Method     % Neutrophils 82.8 %    % Lymphocytes 10.0 %    % Monocytes 6.2 %    % Eosinophils 0.0 %    % Basophils 0.1 %    % Immature Granulocytes 0.9 %    Nucleated RBCs 0 0 /100    Absolute Neutrophil 12.1 (H) 1.6 - 8.3 10e9/L    Absolute Lymphocytes 1.5 0.8 - 5.3 10e9/L    Absolute Monocytes 0.9 0.0 - 1.3 10e9/L    Absolute Eosinophils 0.0 0.0 - 0.7 10e9/L    Absolute Basophils 0.0 0.0 - 0.2 10e9/L    Abs Immature Granulocytes 0.1 0 - 0.4 10e9/L    Absolute Nucleated RBC 0.0    Basic metabolic panel   Result Value Ref Range    Sodium 138 133 - 144 mmol/L    Potassium 3.9 3.4 - 5.3 mmol/L    Chloride 108 94 - 109 mmol/L    Carbon Dioxide 17 (L) 20 - 32 mmol/L    Anion Gap 13 3 - 14 mmol/L    Glucose 188 (H) 70 - 99 mg/dL    Urea Nitrogen 60 (H) 7 - 30 mg/dL    Creatinine 2.04 (H) 0.66 - 1.25 mg/dL    GFR Estimate 30 (L) >60 mL/min/[1.73_m2]    GFR Estimate If Black 35 (L) >60  mL/min/[1.73_m2]    Calcium 8.2 (L) 8.5 - 10.1 mg/dL   CBC with platelets   Result Value Ref Range    WBC 14.0 (H) 4.0 - 11.0 10e9/L    RBC Count 3.83 (L) 4.4 - 5.9 10e12/L    Hemoglobin 11.5 (L) 13.3 - 17.7 g/dL    Hematocrit 35.4 (L) 40.0 - 53.0 %    MCV 92 78 - 100 fl    MCH 30.0 26.5 - 33.0 pg    MCHC 32.5 31.5 - 36.5 g/dL    RDW 12.9 10.0 - 15.0 %    Platelet Count 244 150 - 450 10e9/L   Lactic acid whole blood   Result Value Ref Range    Lactic Acid 2.6 (H) 0.7 - 2.0 mmol/L     *Note: Due to a large number of results and/or encounters for the requested time period, some results have not been displayed. A complete set of results can be found in Results Review.      Recent Results (from the past 48 hour(s))   Cervical spine CT w/o contrast    Narrative    CT CERVICAL SPINE W/O CONTRAST 7/23/2019 1:54 PM    Provided History: Neck pain, initial exam; neck pain r/o DJD    Comparison: MR cervical spine 5/22/2018, CT head 9/9/2015    Technique: Using multidetector thin collimation helical acquisition  technique, axial, coronal and sagittal CT images through the cervical  spine were obtained without intravenous contrast.     Findings:  Multilevel cervical spondylosis with straightening of the normal  lordotic curvature. Minimal grade 1 degenerative retrolisthesis of C2  on C3. No acute fracture or subluxation. No prevertebral edema. There  is disc space narrowing most pronounced at C3-4 and C6-7. The findings  on a level by level basis are as follows:    C1-2: Thickening and calcification within the soft tissue posterior to  the odontoid process. This is causing mild spinal canal stenosis at  this level and has progressed from 5/22/2008 MRI.     C2-3: Left posterior osteophytic spurring and facet hypertrophy left  greater than right causing moderate left neural foraminal stenosis. No  spinal canal stenosis.    C3-4:  Mild posterior osteophytic spurring with mild bilateral neural  foraminal stenosis. There is also  anterior osteophytic spurring. No  spinal canal stenosis.    C4-5:  Mild posterior osteophytic spurring with facet hypertrophy  right greater than left. Moderate right neural foraminal stenosis. No  spinal canal stenosis.    C5-6:  Left facet hypertrophy with mild bilateral neural foraminal  stenosis. No spinal canal stenosis.    C6-7:  Mild facet hypertrophy with mild neuroforaminal stenosis  bilaterally. No spinal canal stenosis.    C7-T1:  Mild right facet hypertrophy causing mild right neural  foraminal stenosis. No left neural foraminal stenosis. No spinal canal  stenosis.     No additional abnormality of the paraspinous soft tissues.      Impression    Impression:   1. No acute fracture or traumatic subluxation.  2. Cervical spondylosis as above with multilevel neural foraminal  stenosis. There is thickening and calcification within the soft tissue  posterior to the odontoid process causing mild spinal canal stenosis  at this level which has progressed from 5/22/2008. This can be seen  with rheumatoid arthritis.    I have personally reviewed the examination and initial interpretation  and I agree with the findings.    DALILA BOLTON MD   Head CT w/o contrast    Narrative    CT HEAD W/O CONTRAST 7/23/2019 1:55 PM    Provided History: unexplained confusion    Comparison: CT head 9/9/2015.    Technique: Using multidetector thin collimation helical acquisition  technique, axial, coronal and sagittal CT images from the skull base  to the vertex were obtained without intravenous contrast.     Findings:    No intracranial hemorrhage, mass effect, or midline shift. The lateral  and third ventricles are disproportionately large compared to the  sulci, overall similar to 9/9/2015, with no evidence of obstruction.  Unchanged 3 mm hyperintense focus in the roof of the third ventricle.  Unchanged subcortical and periventricular white matter hypodensities,  consistent with chronic small vessel ischemic changes. The gray  to  white matter differentiation of the cerebral hemispheres is preserved.  The basal cisterns are patent. Atherosclerotic vascular calcifications  noted.    The visualized paranasal sinuses are clear. The mastoid air cells are  clear.       Impression    Impression:   1. No acute intracranial pathology.   2. Symmetric unobstructed ventriculomegaly of the lateral and third  ventricles, disproportionate to the sulci, unchanged compared to  9/9/2015. This is suggestive of normal pressure hydrocephalus.  3. 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  4. Unchanged small vessel ischemic changes.    I have personally reviewed the examination and initial interpretation  and I agree with the findings.    DALILA BOLTON MD                Pending Results:   Unresulted Labs Ordered in the Past 30 Days of this Admission     Date and Time Order Name Status Description    7/22/2019 1317 Blood culture Preliminary     7/22/2019 1317 Blood culture Preliminary                   Discharge Instructions and Follow-Up:     Discharge Procedure Orders   General info for SNF   Order Comments: Length of Stay Estimate: Short Term Care: Estimated # of Days <30  Condition at Discharge: Stable  Level of care:skilled   Rehabilitation Potential: Fair  Admission H&P remains valid and up-to-date: Yes  Recent Chemotherapy: N/A  Use Nursing Home Standing Orders: N/A     Mantoux instructions   Order Comments: Give two-step Mantoux (PPD) Per Facility Policy Yes     Reason for your hospital stay   Order Comments: You were admitted for pneumonia. You were treated with IV antibiotics and breathing treatments. Your symptoms are currently improving. You are discharged with oral antibiotics to continue until course is completed. Additionally, you underwent and echocardiogram and this was normal. The chest pain you complained was thought to be musculoskeletal pain. You will discharge with oxycodone and Lidocaine patch for pain control.  Please take other home medications as before. Follow up with primary in 1 week.     Glucose monitor nursing POCT   Order Comments: Before meals and at bedtime     Activity - Up with assistive device     Order Specific Question Answer Comments   Is discharge order? Yes      Follow Up and recommended labs and tests   Order Comments: Follow up with primary care in 1 week to recheck kidney function and CBC     DNR/DNI     Order Specific Question Answer Comments   Code status determined by: Discussion with patient/legal decision maker      Physical Therapy Adult Consult   Order Comments: Evaluate and treat as clinically indicated.    Reason:  Rehabilitation     Occupational Therapy Adult Consult   Order Comments: Evaluate and treat as clinically indicated.    Reason:  Cognition     Fall precautions     Pneumatic Compression Device    Order Comments: Bilateral calf. Remove 30 mins BID.     Advance Diet as Tolerated   Order Comments: Follow this diet upon discharge: Regular     Order Specific Question Answer Comments   Is discharge order? Yes           Attestation:  Lulu Cooper PA-C        This patient was discussed with the Care Team in the OBS Unit.  The patient's chart was reviewed and the patient was also seen and evaluated by me.  The plan of care was discussed and reviewed with the Care Team.  The above documentation reflects the evaluation, medical decision making and plan under my supervision.    Alberto Graves MD, FACEP  [facility] Staff Emergency Physician

## 2019-07-24 NOTE — PLAN OF CARE
Outpatient/Observation goals to be met before discharge home:     -Dyspnea improved and oxygen saturations greater than 88% on room air or prior home oxygen levels -yes  - Tolerates oral antibiotics or has plans for home infusion set up. -receiving IV antibiotics at this time   - Vital signs normal or at patient baseline -yes  - Infection is improving -pending   - Return to baseline functional status -no  - Safe disposition plan has been identified -yes, TCU      -Pt mostly reporting pain in his neck, has slept throughout the night, voiding spontaneously, hr ranging from 80's to low 100's, b.s. Rechecked at 0400 199, echo to be done in the morning, will continue to monitor   /78   Pulse 94   Temp 97.9  F (36.6  C) (Oral)   Resp 16   Wt 93 kg (205 lb)   SpO2 99%   BMI 31.17 kg/m

## 2019-07-24 NOTE — PROGRESS NOTES
[facility] [place]  Daily Progress Note          Assessment & Plan:   Rolando Gonzalez is a 78 year old male w/PMH significant for osteoarthritis, RLL lung nodule, moderate persistent asthma, hypertension, hyperlipoidemia, GERD, gout, ED, diabetic neuropathy, DM type 2 who presented to the ED with neck pain.     1. CAP/bronchitis:  HR was 102-115, /63, he was afebrile. Labs: BMP showed creatinine <0.015  .  Procal 0.08.  Lactic acid 1.1.  Glucose 164.  Venous blood gas was unremarkable.  CBC with WBC 13.8. Chest x-ray showed small amount of left basilar atelectasis, otherwise no acute cardiopulmonary findings. EKG showed sinus tachycardia with RBBB (unchanged from prior).  He was given 2g Rocephin IV, flexeril, doxycyline PO, dilaudid, duoneb, prednisone 40 mg PO, and lidoderm patch in the ED.  He was admitted to the observation unit for possible pneumonia treatment. UA negative. BC NTD  -continue doxycyline 100 mg po BID, rocephin IV  - Duonebs Q 4 hours while awake, albuterol nebs Q 2 hours prn  -continue prednisone 40 mg po daily  -NS at 50 ml/hour  -telemetry  -sputum culture  -repeat BMP, CBC in am     2. Neck pain:Patient reports posterior neck pain that is worse with movement. He denies numbness or tingling in his extremities or weakness.  CT cervical spine: No acute fracture or traumatic subluxation. Cervical spondylosis as above with multilevel neural foraminal stenosis. There is thickening and calcification within the soft tissue  posterior to the odontoid process causing mild spinal canal stenosis at this level which has progressed from 5/22/2008. This can be seen with rheumatoid arthritis. PT consulted and recommended TCU   -pain control with: robaxin 4 times daily, tylenol scheduled, lidoderm patches,oxycodone prn for breakthrough pain-aqua K pad to neck.  -fall precautions  - SW consult for TCU placement       3. Confusion: Patient's family and friends in the  room and reported patient appeared more confused than usual. Patient had head CT on 09/09/2015 because of his memory loss.  Findings of symmetric ventriculomegaly involving the lateral and third ventricles out of proportion to the cortical sulci.  These findings can be seen in normal pressure hydrocephalus.  There are findings of 3 mm colloid cyst at the roof of the third ventricle and findings of moderate small vessel ischemic changes. He was seen in the neurology clinic in 2015 and the physician recommended MOCA testing.  MOCA attempted, patient declined MOCA.  Neurologist also recommended neurocognitive study which does not appear it was completed. CT head today showed No acute intracranial pathology. Symmetric unobstructed ventriculomegaly of the lateral and third ventricles, disproportionate to the sulci, unchanged compared to 9/9/2015. This is suggestive of normal pressure hydrocephalus. 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  - OT while patient here  - Outpatient neurocognitive study.     4. Chest pain, tachycardia with frequent PVCs: Reports chest pain since this afternoon.  Describes it as a pinch in his left side of chest.  Worse with movement.  Likely musculoskeletal. EKG shows sinus tachycardia with frequent PVCs and RBBB (unchanged from 7/22/19), 2nd trop negative.  Tachycardia could be secondary to steroids and nebulizers.  He has been tachycardic throughout his stay. Creatinine bumped slightly today, patient could be dry.  -3rd trop now  -BMP, magnesium, phos now  - ml bolus now  -resting echo in am    5. GÉNESIS, CKD: creatinine 1.60 on admit, but increased to 1.98 this am.  Had been on gentle IVF throughout the night.  Will give 500 ml NS bolus.  Patient states he has been eating and drinking well.  -repeat BMP now     Chronic medical problems:  #moderate persistent asthma: duonebs Q 4 hours while awake, albuterol Q 2 hours prn, prednisone 40 mg po daily, antibiotics as  above, hold home inhalers given obs stay  #gout: continue PTA allopurinol  #DM type 2: blood sugars before meals and bedtime, continue PTA glipizide and metformin, Lantus 30 units BID (patient initially said he does this at bedtime, but now states it is BID and that is what is prescribed)-this would explain why he has been hyperglycemic today as he only received it once in the past 24 hours.  #hypertension: continue PTA losartan, amlodipine  #GERD: continue PTA PPI  #diabetic neuropathy: continue PTA gabapentin  #CKD: creatinine at baseline 1.60, patient appears slightly dry, will give gentle IVF    FEN: moderate CHO diet  Lines: PIV  Prophylaxis: anticipate short stay, SCDs          Consults:   none         Discharge Planning:   Pending echo, OT consult, and TCU placement        Interval History:   Geovanny is doing good tonight.  Reports that his back and neck pain are much better than on admission.  He does report a pinching feeling in his left chest, near his ribs.  Worse with movement.  He thinks it is musculoskeletal related to all of his coughing prior to admission.    ROS:   Constitutional: No fevers/chills. Tolerating diet.   Cardiovascular: No chest pain or palpitations.   Respiratory: No cough or SOB.   GI: No abdominal pain. No N/V.      : No urinary complaints.   Musculoskeletal: +neck, back and left sided chest pain         Physical Exam:   BP (!) 128/93 (BP Location: Left arm)   Pulse 93   Temp 97.5  F (36.4  C) (Oral)   Resp 16   Wt 93 kg (205 lb)   SpO2 96%   BMI 31.17 kg/m       GENERAL: Alert and oriented x 3. NAD.   HEENT: Anicteric sclera. Mucous membranes moist.   CV: RRR. S1, S2. No murmurs appreciated.   RESPIRATORY: Effort normal. Lungs CTAB with no wheezing, rales, rhonchi.   GI: Abdomen soft and non distended with normoactive bowel sounds present in all quadrants. No tenderness, rebound, guarding.   NEUROLOGICAL: No focal deficits. Moves all extremities.    EXTREMITIES: No peripheral  edema. Intact bilateral pedal pulses.   SKIN: No jaundice. No rashes.     Medication list reviewed.   Labs and imaging were reviewed.     LORI Rios, CNP  [facility] #[id number]        Addendum:   0600  Patient triggered the sepsis protocol with lactic acid 3.5, he was given 1.5 L NS boluses total, glucose has been elevated the last 24 hours likely due to the fact his lantus was originally ordered 30 units at bedtime instead of twice daily and steroid use.  When he was admitted he said he took is lantus daily and a family friend confirmed that, but this shift he is sure that he takes it as listed in epic as 30 units BID.  He has also been receiving duonebs Q 4 hours contributing to the elevated lactic acid.  Labs showed a worsening creatinine of 2.38 with anion gap 16 and bicarb 15.  Likely early DKA in setting of hyperglycemia.  WBC 14.6.  He was given insulin overnight to correct his blood sugar and it is now 188 with creatinine improving to 2.04. Bicarb 17, anion gap 13.  Lactic acid 2.6 this am.  NS continues at 100 ml/hour.    -recommend repeating labs (lactic acid, BMP) later this afternoon  -continue NS at 100 ml/hour  -continue to aggressively manage hyperglycemia   -duonebs switched to Q 4 hours prn  -hold metformin given GÉNESIS in setting of CKD    LORI Rios, CNP

## 2019-07-24 NOTE — PLAN OF CARE
- Dyspnea improved and oxygen saturations greater than 88% on room air or prior home oxygen levels: Yes  - Tolerates oral antibiotics or has plans for home infusion set up: No  - Vital signs normal or at patient baseline: Yes  - Infection is improving: Yes  - Return to baseline functional status: Yes  - Safe disposition plan has been identified: Pending

## 2019-07-24 NOTE — PLAN OF CARE
PT / 6D -     Physical Therapy Discharge Summary    Reason for therapy discharge:    Discharged to transitional care facility.    Progress towards therapy goal(s). See goals on Care Plan in [facility] electronic health record for goal details.  Goals partially met.  Barriers to achieving goals:   discharge from facility.    Therapy recommendation(s):    Continued therapy is recommended.  Rationale/Recommendations:  Improve overall strength and endurance to return to independence as patient lives alone.

## 2019-07-24 NOTE — PROGRESS NOTES
[facility], [place]    Sepsis Evaluation Progress Note    Date of Service: 07/23/2019    I was called to see Rolando Gonzalez due to abnormal vital signs triggering the Sepsis SIRS screening alert. He is known to have an infection. Possible CAP/bronchitis    Physical Exam    Vital Signs:  Temp: 97.7  F (36.5  C) Temp src: Oral BP: 111/83 Pulse: 102 Heart Rate: 103 Resp: 16 SpO2: 97 % O2 Device: None (Room air)      Lab:  Lactic Acid   Date Value Ref Range Status   07/23/2019 3.5 (H) 0.7 - 2.0 mmol/L Final       The patient is at baseline mental status.    The rest of their physical exam is significant for neck and back musculoskeletal pain and left sided chest pain.    Assessment and Plan    The SIRS and exam findings are likely due to nebulaizers containing albuterol, there is no sign of sepsis at this time.    Disposition: The patient will remain on the current unit. We will continue to monitor this patient closely.  His lung sounds are clear, so we will hold off on nebulizers overnight, continue IVF, and recheck lactic acid in am.  He is receiving IVF as well.  Discussed this with attending, Dr. Graves and he is in agreement.    LORI Rubio CNP

## 2019-07-24 NOTE — PLAN OF CARE
"Outpatient/Observation goals to be met before discharge home:     - Dyspnea improved and oxygen saturations greater than 88% on room air or prior home oxygen levels -O2 sats mids 90's on RA, did report mild cough, and chest tightness paged RT for prn albuterol neb   - Tolerates oral antibiotics or has plans for home infusion set up. -receiving IV antibiotics at this time   - Vital signs normal or at patient baseline -tachycardic 105-116, bolus ordered   - Infection is improving -pending   - Return to baseline functional status -no  - Safe disposition plan has been identified -yes, TCU  -Pt reports having a \"pinching\" feeling in his chest, provider ordered EKG and troponin     "

## 2019-07-28 LAB
BACTERIA SPEC CULT: NO GROWTH
BACTERIA SPEC CULT: NO GROWTH
Lab: NORMAL
Lab: NORMAL
SPECIMEN SOURCE: NORMAL
SPECIMEN SOURCE: NORMAL

## 2019-07-29 ENCOUNTER — MYC MEDICAL ADVICE (OUTPATIENT)
Dept: INTERNAL MEDICINE | Facility: CLINIC | Age: 79
End: 2019-07-29

## 2019-08-02 ENCOUNTER — OFFICE VISIT (OUTPATIENT)
Dept: INTERNAL MEDICINE | Facility: CLINIC | Age: 79
End: 2019-08-02
Payer: MEDICARE

## 2019-08-02 ENCOUNTER — HOSPITAL ENCOUNTER (OUTPATIENT)
Dept: MRI IMAGING | Facility: CLINIC | Age: 79
Discharge: HOME | End: 2019-08-02
Attending: INTERNAL MEDICINE | Admitting: INTERNAL MEDICINE
Payer: MEDICARE

## 2019-08-02 VITALS
DIASTOLIC BLOOD PRESSURE: 81 MMHG | OXYGEN SATURATION: 98 % | SYSTOLIC BLOOD PRESSURE: 162 MMHG | WEIGHT: 210.9 LBS | BODY MASS INDEX: 32.07 KG/M2 | HEART RATE: 89 BPM | TEMPERATURE: 97.8 F | RESPIRATION RATE: 18 BRPM

## 2019-08-02 DIAGNOSIS — D72.829 LEUKOCYTOSIS, UNSPECIFIED TYPE: ICD-10-CM

## 2019-08-02 DIAGNOSIS — N18.30 CKD (CHRONIC KIDNEY DISEASE) STAGE 3, GFR 30-59 ML/MIN (H): ICD-10-CM

## 2019-08-02 DIAGNOSIS — M54.2 NECK PAIN: ICD-10-CM

## 2019-08-02 DIAGNOSIS — R41.89 COGNITIVE DECLINE: ICD-10-CM

## 2019-08-02 DIAGNOSIS — Z79.4 TYPE 2 DIABETES MELLITUS WITH DIABETIC NEPHROPATHY, WITH LONG-TERM CURRENT USE OF INSULIN (H): ICD-10-CM

## 2019-08-02 DIAGNOSIS — Z76.89 ENCOUNTER TO ESTABLISH CARE WITH NEW DOCTOR: Primary | ICD-10-CM

## 2019-08-02 DIAGNOSIS — E11.21 TYPE 2 DIABETES MELLITUS WITH DIABETIC NEPHROPATHY, WITH LONG-TERM CURRENT USE OF INSULIN (H): ICD-10-CM

## 2019-08-02 LAB
ANION GAP SERPL CALCULATED.3IONS-SCNC: 5 MMOL/L (ref 3–14)
BASOPHILS # BLD AUTO: 0.1 10E9/L (ref 0–0.2)
BASOPHILS NFR BLD AUTO: 0.9 %
BUN SERPL-MCNC: 31 MG/DL (ref 7–30)
CALCIUM SERPL-MCNC: 9.4 MG/DL (ref 8.5–10.1)
CHLORIDE SERPL-SCNC: 106 MMOL/L (ref 94–109)
CO2 SERPL-SCNC: 29 MMOL/L (ref 20–32)
CREAT SERPL-MCNC: 1.48 MG/DL (ref 0.66–1.25)
DIFFERENTIAL METHOD BLD: ABNORMAL
EOSINOPHIL # BLD AUTO: 0.3 10E9/L (ref 0–0.7)
EOSINOPHIL NFR BLD AUTO: 3.1 %
ERYTHROCYTE [DISTWIDTH] IN BLOOD BY AUTOMATED COUNT: 12.5 % (ref 10–15)
GFR SERPL CREATININE-BSD FRML MDRD: 44 ML/MIN/{1.73_M2}
GLUCOSE SERPL-MCNC: 141 MG/DL (ref 70–99)
HCT VFR BLD AUTO: 38.3 % (ref 40–53)
HGB BLD-MCNC: 12.7 G/DL (ref 13.3–17.7)
IMM GRANULOCYTES # BLD: 0.1 10E9/L (ref 0–0.4)
IMM GRANULOCYTES NFR BLD: 0.6 %
LYMPHOCYTES # BLD AUTO: 2.6 10E9/L (ref 0.8–5.3)
LYMPHOCYTES NFR BLD AUTO: 24.3 %
MCH RBC QN AUTO: 31.1 PG (ref 26.5–33)
MCHC RBC AUTO-ENTMCNC: 33.2 G/DL (ref 31.5–36.5)
MCV RBC AUTO: 94 FL (ref 78–100)
MONOCYTES # BLD AUTO: 0.8 10E9/L (ref 0–1.3)
MONOCYTES NFR BLD AUTO: 7.5 %
NEUTROPHILS # BLD AUTO: 6.7 10E9/L (ref 1.6–8.3)
NEUTROPHILS NFR BLD AUTO: 63.6 %
NRBC # BLD AUTO: 0 10*3/UL
NRBC BLD AUTO-RTO: 0 /100
PLATELET # BLD AUTO: 340 10E9/L (ref 150–450)
POTASSIUM SERPL-SCNC: 4.5 MMOL/L (ref 3.4–5.3)
RBC # BLD AUTO: 4.08 10E12/L (ref 4.4–5.9)
SODIUM SERPL-SCNC: 140 MMOL/L (ref 133–144)
WBC # BLD AUTO: 10.6 10E9/L (ref 4–11)

## 2019-08-02 PROCEDURE — 72141 MRI NECK SPINE W/O DYE: CPT

## 2019-08-02 RX ORDER — DIAZEPAM 5 MG
5 TABLET ORAL ONCE
Qty: 1 TABLET | Refills: 0 | Status: SHIPPED | OUTPATIENT
Start: 2019-08-02 | End: 2019-08-02

## 2019-08-02 ASSESSMENT — PAIN SCALES - GENERAL: PAINLEVEL: NO PAIN (0)

## 2019-08-02 NOTE — NURSING NOTE
Chief Complaint   Patient presents with     Establish Care     here to re-establish care with a new PCP.  PCP retired.     Hospital F/U     Here for hospital and TCU discharge follow up       Len Jorgensen CMA ([facility]) at 8:58 AM on 8/2/2019
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
## 2019-08-02 NOTE — PATIENT INSTRUCTIONS
[facility] Medication Refill Request Information:  * Please contact your pharmacy regarding ANY request for medication refills.  ** [facility] Prescription Fax = [phone]  * Please allow 3 business days for routine medication refills.  * Please allow 5 business days for controlled substance medication refills.     [facility] Test Result notification information:  *You will be notified with in 7-10 days of your appointment day regarding the results of your test.  If you are on MyChart you will be notified as soon as the provider has reviewed the results and signed off on them.    [facility] [phone]

## 2019-08-15 NOTE — TELEPHONE ENCOUNTER
RECORDS RECEIVED FROM: Internal - Dr Zapata   DATE RECEIVED: 9/3/19   NOTES (FOR ALL VISITS) STATUS DETAILS   OFFICE NOTES from referring provider Internal 8/2/19 12/17/18 7/25/18  (additional encounters in Epic)   OFFICE NOTES from other specialist N/A    ED NOTES N/A    OPERATIVE REPORT  (thyroid, pituitary, adrenal, parathyroid) N/A    MEDICATION LIST Internal    IMAGING      DEXASCAN N/A    MRI (BRAIN) N/A    XR (Chest) N/A    CT (HEAD/NECK/CHEST/ABDOMEN) N/A    NUCLEAR  N/A    ULTRASOUND (HEAD/NECK) N/A    LABS     DIABETES: HBGA1C, CREATININE, FASTING LIPIDS, MICROALBUMIN URINE, POTASSIUM, TSH, T4    THYROID: TSH, T4, CBC, THYRODLONULIN, TOTAL T3, FREE T4, CALCITONIN, CEA Internal   8/2/19

## 2019-08-19 ENCOUNTER — TRANSFERRED RECORDS (OUTPATIENT)
Dept: HEALTH INFORMATION MANAGEMENT | Facility: CLINIC | Age: 79
End: 2019-08-19

## 2019-08-21 ENCOUNTER — TRANSFERRED RECORDS (OUTPATIENT)
Dept: HEALTH INFORMATION MANAGEMENT | Facility: CLINIC | Age: 79
End: 2019-08-21

## 2019-08-26 PROBLEM — K57.92 DIVERTICULITIS: Status: ACTIVE | Noted: 2019-07-24

## 2019-08-27 ENCOUNTER — PRE VISIT (OUTPATIENT)
Dept: UROLOGY | Facility: CLINIC | Age: 79
End: 2019-08-27

## 2019-08-27 NOTE — TELEPHONE ENCOUNTER
Reason for Visit: Follow up per pt, last appt 9/4/2018    Diagnosis: BPH    Orders/Procedures/Records: Records available    Contact Patient: N/A    Rooming Requirements: UA dip/PVR      Sirisha Dang  08/27/19  1:05 PM

## 2019-09-03 ENCOUNTER — PRE VISIT (OUTPATIENT)
Dept: ENDOCRINOLOGY | Facility: CLINIC | Age: 79
End: 2019-09-03

## 2019-09-25 DIAGNOSIS — N39.41 URGENCY INCONTINENCE: ICD-10-CM

## 2019-09-27 RX ORDER — SOLIFENACIN SUCCINATE 5 MG/1
5 TABLET, FILM COATED ORAL DAILY
Qty: 30 TABLET | Refills: 0 | Status: SHIPPED | OUTPATIENT
Start: 2019-09-27

## 2019-09-27 NOTE — TELEPHONE ENCOUNTER
solifenacin (VESICARE) 5 MG tablet  Last Written Prescription Date:  1/5/18  Last Fill Quantity: 90,   # refills: 2  Last Office Visit : 9/4/18  Future Office visit: none    30 day to pharmacy    Scheduling has been notified to contact the pt for appointment.

## 2019-10-01 ENCOUNTER — HEALTH MAINTENANCE LETTER (OUTPATIENT)
Age: 79
End: 2019-10-01

## 2019-10-07 ENCOUNTER — DOCUMENTATION ONLY (OUTPATIENT)
Dept: CARE COORDINATION | Facility: CLINIC | Age: 79
End: 2019-10-07

## 2019-11-05 DIAGNOSIS — N39.41 URGENCY INCONTINENCE: ICD-10-CM

## 2019-11-05 NOTE — TELEPHONE ENCOUNTER
[facility] Call Center    Phone Message    May a detailed message be left on voicemail: no    Reason for Call: Medication Refill Request    Has the patient contacted the pharmacy for the refill? Yes   Name of medication being requested: insulin pen needle (B-D U/F) 31G X 8 MM  Provider who prescribed the medication: Dr. Russ, but Dr. Zapata is PCP now  Pharmacy: [facility]  Date medication is needed: Asap, Pt needs new script due to pharmacy change.    Action Taken: Message routed to:  [facility]

## 2019-11-06 ENCOUNTER — TELEPHONE (OUTPATIENT)
Dept: UROLOGY | Facility: CLINIC | Age: 79
End: 2019-11-06

## 2019-11-06 NOTE — TELEPHONE ENCOUNTER
solifenacin (VESICARE) 5 MG tablet      Last Written Prescription Date:  9-27-19  Last Fill Quantity: 30,   # refills: 0  Last Office Visit : 9-4-18  Future Office visit:  none    Routing refill request to provider for review/approval because:  Previous 30 day patricia refill   2 no show appt  ? Authorize 2nd 30 RF    Scheduling has been notified to contact the pt for appointment.

## 2019-11-08 RX ORDER — SOLIFENACIN SUCCINATE 5 MG/1
5 TABLET, FILM COATED ORAL DAILY
Qty: 30 TABLET | Refills: 0 | OUTPATIENT
Start: 2019-11-08

## 2019-11-08 NOTE — TELEPHONE ENCOUNTER
Patient is going to different urologist  Needs to get this refilled there. Inés Jimenez, DESIREEN Staff Nurse

## 2019-12-01 DIAGNOSIS — N39.41 URGENCY INCONTINENCE: ICD-10-CM

## 2019-12-02 RX ORDER — SOLIFENACIN SUCCINATE 5 MG/1
5 TABLET, FILM COATED ORAL DAILY
Qty: 30 TABLET | Refills: 0 | OUTPATIENT
Start: 2019-12-02

## 2019-12-02 NOTE — TELEPHONE ENCOUNTER
"solifenacin (VESICARE) 5 MG tablet  Last Written Prescription Date:  9/27/19  Last Fill Quantity: 30,   # refills: 0  Last Office Visit : 9/4/18  Future Office visit: none    \" -RTC PRN\"    Scheduling has been notified to contact the pt for appointment.      Routing refill request to provider for review/approval because: per note \"-RTC PRN \" per protocol q 12 mths. 30 day patricia rf previous. rf or refuse?  "

## 2019-12-15 ENCOUNTER — HEALTH MAINTENANCE LETTER (OUTPATIENT)
Age: 79
End: 2019-12-15

## 2019-12-17 DIAGNOSIS — I10 ESSENTIAL HYPERTENSION WITH GOAL BLOOD PRESSURE LESS THAN 130/80: ICD-10-CM

## 2019-12-18 NOTE — TELEPHONE ENCOUNTER
AMLODIPINE BESYLATE 10MG TABS     Last Written Prescription Date:  7/10/2019  Last Fill Quantity: 90,   # refills: 0  Last Office Visit : 8/2/2019  Future Office visit:  None    Routing refill request to provider for review/approval because:  Blood pressure out of range.    Due for follow up appointment.     Lab Check:  Creatinine?  Change Med?   Change Dose?  Refer to Provider for review.                     Apolonia Vaz RN  Central Triage Red Flags/Med Refills      08/02/19 (!) 162/81   07/24/19 133/69   07/19/19 (!) 153/93      Instructions   8/2/2019      Return in about 4 months (around 12/2/2019) for Routine Visit.

## 2019-12-19 RX ORDER — AMLODIPINE BESYLATE 10 MG/1
10 TABLET ORAL DAILY
Qty: 30 TABLET | Refills: 0 | Status: SHIPPED | OUTPATIENT
Start: 2019-12-19 | End: 2020-01-14

## 2019-12-23 DIAGNOSIS — I10 ESSENTIAL HYPERTENSION WITH GOAL BLOOD PRESSURE LESS THAN 130/80: ICD-10-CM

## 2019-12-27 RX ORDER — AMLODIPINE BESYLATE 10 MG/1
TABLET ORAL
Qty: 90 TABLET | Refills: 0 | OUTPATIENT
Start: 2019-12-27

## 2019-12-27 NOTE — TELEPHONE ENCOUNTER
amLODIPine (NORVASC) 10 MG tablet  Last Written Prescription Date:  12/19/19  Last Fill Quantity: 30,   # refills: 0  Last Office Visit : 8/2/2019  Future Office visit:  None     Routing refill request to provider for review/approval because:  Blood pressure out of range.    Due for follow up appointment.     Lab Check:  Creatinine  Lab Test 08/02/19  1053   CR 1.48       Change Med?   Change Dose?  Refer to Provider for review.                      Thank-you, Germaine WELCH RN Medication Refill Team           08/02/19 (!) 162/81   07/24/19 133/69    07/19/19 (!) 153/93       Instructions   8/2/2019      Return in about 4 months (around 12/2/2019) for Routine Visit.

## 2019-12-27 NOTE — TELEPHONE ENCOUNTER
"Note from  at office visit on 8/2/19:     \"Return in about 4 months (around 12/2/2019) for Routine Visit.\"      _______________________________      No appt has been made yet for follow up.  Patient has an appt made for 12/6/19, but cancelled.      Luzmaria Morales RN on 12/27/2019 at 12:12 PM        "

## 2020-01-13 DIAGNOSIS — I10 ESSENTIAL HYPERTENSION WITH GOAL BLOOD PRESSURE LESS THAN 130/80: ICD-10-CM

## 2020-01-14 RX ORDER — AMLODIPINE BESYLATE 10 MG/1
10 TABLET ORAL DAILY
Qty: 14 TABLET | Refills: 0 | Status: SHIPPED | OUTPATIENT
Start: 2020-01-14

## 2020-01-14 NOTE — TELEPHONE ENCOUNTER
AMLODIPINE BESYLATE 10MG TABS     Last Written Prescription Date:  12/19/2019  Last Fill Quantity: 30,   # refills: 0  Last Office Visit : 8/2/2019  Future Office visit:  None    Routing refill request to provider for review/approval because:    Blood pressure out of range.    Due for follow up appointment.     Lab Check:  Creatinine?  Change Med?   Change Dose?  Refer to Provider for review.    Provider- this is a Fourth refill request for medication with out or overdue labs. With last request pt was given 30 day [employer] and [employer] notified.                         Apolonia Vaz RN  Central Triage Red Flags/Med Refills              08/02/19 (!) 162/81   07/24/19 133/69    07/19/19 (!) 153/93       Instructions   8/2/2019      Return in about 4 months (around 12/2/2019) for Routine Visit.

## 2020-03-22 ENCOUNTER — HEALTH MAINTENANCE LETTER (OUTPATIENT)
Age: 80
End: 2020-03-22

## 2020-03-27 ENCOUNTER — MYC MEDICAL ADVICE (OUTPATIENT)
Dept: INTERNAL MEDICINE | Facility: CLINIC | Age: 80
End: 2020-03-27

## 2021-01-15 ENCOUNTER — HEALTH MAINTENANCE LETTER (OUTPATIENT)
Age: 81
End: 2021-01-15

## 2021-09-04 ENCOUNTER — HEALTH MAINTENANCE LETTER (OUTPATIENT)
Age: 81
End: 2021-09-04

## 2022-01-01 ENCOUNTER — HEALTH MAINTENANCE LETTER (OUTPATIENT)
Age: 82
End: 2022-01-01

## 2022-02-19 ENCOUNTER — HEALTH MAINTENANCE LETTER (OUTPATIENT)
Age: 82
End: 2022-02-19

## 2022-03-18 NOTE — PROGRESS NOTES
Observation goals PRIOR TO DISCHARGE     Comments: List all goals to be met before discharge home:   - Dyspnea improved and oxygen saturations greater than 88% on room air or prior home oxygen levels. Yes   - Tolerates oral antibiotics or has plans for home infusion set up. No, tolerating well.  - Vital signs normal or at patient baseline. Yes  - Infection is improving. No, in progress.  - Return to baseline functional status. No  - Safe disposition plan has been identified. No  - Nurse to notify provider when observation goals have been met and patient is ready for discharge        Initial Size Of Lesion: 1.6

## 2022-04-16 ENCOUNTER — HEALTH MAINTENANCE LETTER (OUTPATIENT)
Age: 82
End: 2022-04-16

## 2022-10-16 ENCOUNTER — HEALTH MAINTENANCE LETTER (OUTPATIENT)
Age: 82
End: 2022-10-16

## 2023-01-01 ENCOUNTER — HEALTH MAINTENANCE LETTER (OUTPATIENT)
Age: 83
End: 2023-01-01

## 2023-12-07 NOTE — TELEPHONE ENCOUNTER
Appointment Change  Cancel, Reschedule, Change to Virtual      Who are you speaking with? Spouse   If it is not the patient, is the caller listed on the communication consent form? Yes   Which provider is the appointment scheduled with? Dr. Agustina Bran   When was the original appointment scheduled? Please list date and time 2/20/24 10:20am   At which location is the appointment scheduled to take place? [place]   Was the appointment rescheduled? Was the appointment changed from an in person visit to a virtual visit? If so, please list the details of the change. 2/23/24 11:40am   What is the reason for the appointment change? Patient had another appointment on 2/20/24 in [facility]. Was [facility] transport scheduled? No   Does [facility] transport need to be scheduled for the new visit (if applicable) No   Does the patient need an infusion appointment rescheduled? No   Does the patient have an upcoming infusion appointment scheduled? If so, when? Yes, 12/13/23   Is the patient undergoing chemotherapy? Yes   For appointments cancelled with less than 24 hours:  Was the no-show policy reviewed?  Yes allopurinol (ZYLOPRIM) 300 MG tablet      Last Written Prescription Date:  9-11-18  Last Fill Quantity: 90,   # refills: 0  Last Office Visit : 12-17-18  Future Office visit:  6-17-19    Routing refill request to provider for review/approval because:  Drug failed  the [facility] or [facility] refill protocol.  Abnormal Cr.

## 2024-01-13 ENCOUNTER — HEALTH MAINTENANCE LETTER (OUTPATIENT)
Age: 84
End: 2024-01-13

## (undated) RX ORDER — TRIAMCINOLONE ACETONIDE 40 MG/ML
INJECTION, SUSPENSION INTRA-ARTICULAR; INTRAMUSCULAR
Status: DISPENSED
Start: 2018-07-29

## (undated) RX ORDER — LIDOCAINE HYDROCHLORIDE 10 MG/ML
INJECTION, SOLUTION INFILTRATION; PERINEURAL
Status: DISPENSED
Start: 2018-07-29